# Patient Record
Sex: MALE | Race: WHITE | NOT HISPANIC OR LATINO | Employment: OTHER | URBAN - METROPOLITAN AREA
[De-identification: names, ages, dates, MRNs, and addresses within clinical notes are randomized per-mention and may not be internally consistent; named-entity substitution may affect disease eponyms.]

---

## 2017-02-01 ENCOUNTER — LAB (OUTPATIENT)
Dept: ENDOCRINOLOGY | Age: 77
End: 2017-02-01

## 2017-02-01 DIAGNOSIS — E29.1 HYPOGONADISM IN MALE: ICD-10-CM

## 2017-02-01 DIAGNOSIS — E78.5 DYSLIPIDEMIA: ICD-10-CM

## 2017-02-01 DIAGNOSIS — N52.8 OTHER MALE ERECTILE DYSFUNCTION: ICD-10-CM

## 2017-02-01 DIAGNOSIS — I10 BENIGN ESSENTIAL HYPERTENSION: ICD-10-CM

## 2017-02-01 DIAGNOSIS — E55.9 VITAMIN D DEFICIENCY: Primary | ICD-10-CM

## 2017-02-01 DIAGNOSIS — E55.9 VITAMIN D DEFICIENCY: ICD-10-CM

## 2017-02-01 DIAGNOSIS — E79.0 HYPERURICEMIA: ICD-10-CM

## 2017-02-01 DIAGNOSIS — N40.0 BENIGN NON-NODULAR PROSTATIC HYPERPLASIA WITHOUT LOWER URINARY TRACT SYMPTOMS: ICD-10-CM

## 2017-02-01 DIAGNOSIS — E11.9 CONTROLLED TYPE 2 DIABETES MELLITUS WITHOUT COMPLICATION (HCC): ICD-10-CM

## 2017-02-01 DIAGNOSIS — R73.9 HYPERGLYCEMIA: ICD-10-CM

## 2017-02-01 DIAGNOSIS — I10 ESSENTIAL HYPERTENSION: ICD-10-CM

## 2017-02-01 DIAGNOSIS — R79.89 LOW TESTOSTERONE: ICD-10-CM

## 2017-02-02 LAB
25(OH)D3+25(OH)D2 SERPL-MCNC: 36.1 NG/ML (ref 30–100)
ALBUMIN SERPL-MCNC: 4.6 G/DL (ref 3.5–5.2)
ALBUMIN/GLOB SERPL: 1.8 G/DL
ALP SERPL-CCNC: 61 U/L (ref 39–117)
ALT SERPL-CCNC: 18 U/L (ref 1–41)
AST SERPL-CCNC: 17 U/L (ref 1–40)
BASOPHILS # BLD AUTO: 0.04 10*3/MM3 (ref 0–0.2)
BASOPHILS NFR BLD AUTO: 0.6 % (ref 0–1.5)
BILIRUB SERPL-MCNC: 0.7 MG/DL (ref 0.1–1.2)
BUN SERPL-MCNC: 26 MG/DL (ref 8–23)
BUN/CREAT SERPL: 18.2 (ref 7–25)
C PEPTIDE SERPL-MCNC: 5.1 NG/ML (ref 1.1–4.4)
CALCIUM SERPL-MCNC: 10.2 MG/DL (ref 8.6–10.5)
CHLORIDE SERPL-SCNC: 100 MMOL/L (ref 98–107)
CHOLEST SERPL-MCNC: 210 MG/DL (ref 0–200)
CO2 SERPL-SCNC: 26.3 MMOL/L (ref 22–29)
CONV COMMENT: NORMAL
CREAT SERPL-MCNC: 1.43 MG/DL (ref 0.76–1.27)
EOSINOPHIL # BLD AUTO: 0.23 10*3/MM3 (ref 0–0.7)
EOSINOPHIL NFR BLD AUTO: 3.2 % (ref 0.3–6.2)
ERYTHROCYTE [DISTWIDTH] IN BLOOD BY AUTOMATED COUNT: 13.6 % (ref 11.5–14.5)
GLOBULIN SER CALC-MCNC: 2.5 GM/DL
GLUCOSE SERPL-MCNC: 146 MG/DL (ref 65–99)
HBA1C MFR BLD: 6.59 % (ref 4.8–5.6)
HCT VFR BLD AUTO: 47.5 % (ref 40.4–52.2)
HDLC SERPL-MCNC: 45 MG/DL (ref 40–60)
HGB BLD-MCNC: 15.7 G/DL (ref 13.7–17.6)
IMM GRANULOCYTES # BLD: 0.02 10*3/MM3 (ref 0–0.03)
IMM GRANULOCYTES NFR BLD: 0.3 % (ref 0–0.5)
LDLC SERPL CALC-MCNC: 123 MG/DL (ref 0–100)
LYMPHOCYTES # BLD AUTO: 2.28 10*3/MM3 (ref 0.9–4.8)
LYMPHOCYTES NFR BLD AUTO: 31.5 % (ref 19.6–45.3)
MCH RBC QN AUTO: 31.6 PG (ref 27–32.7)
MCHC RBC AUTO-ENTMCNC: 33.1 G/DL (ref 32.6–36.4)
MCV RBC AUTO: 95.6 FL (ref 79.8–96.2)
MONOCYTES # BLD AUTO: 0.5 10*3/MM3 (ref 0.2–1.2)
MONOCYTES NFR BLD AUTO: 6.9 % (ref 5–12)
NEUTROPHILS # BLD AUTO: 4.16 10*3/MM3 (ref 1.9–8.1)
NEUTROPHILS NFR BLD AUTO: 57.5 % (ref 42.7–76)
PLATELET # BLD AUTO: 189 10*3/MM3 (ref 140–500)
POTASSIUM SERPL-SCNC: 4.5 MMOL/L (ref 3.5–5.2)
PROT SERPL-MCNC: 7.1 G/DL (ref 6–8.5)
PSA SERPL-MCNC: 1.19 NG/ML (ref 0–4)
RBC # BLD AUTO: 4.97 10*6/MM3 (ref 4.6–6)
SHBG SERPL-SCNC: 26.2 NMOL/L (ref 19.3–76.4)
SODIUM SERPL-SCNC: 142 MMOL/L (ref 136–145)
T4 SERPL-MCNC: 6.42 MCG/DL (ref 4.5–11.7)
TESTOST FREE SERPL-MCNC: 14.1 PG/ML (ref 6.6–18.1)
TESTOST SERPL-MCNC: 513 NG/DL (ref 348–1197)
TRIGL SERPL-MCNC: 209 MG/DL (ref 0–150)
TSH SERPL DL<=0.005 MIU/L-ACNC: 3.01 MIU/ML (ref 0.27–4.2)
URATE SERPL-MCNC: 7.9 MG/DL (ref 3.4–7)
VLDLC SERPL CALC-MCNC: 41.8 MG/DL (ref 5–40)
WBC # BLD AUTO: 7.23 10*3/MM3 (ref 4.5–10.7)

## 2017-02-13 ENCOUNTER — OFFICE VISIT (OUTPATIENT)
Dept: ENDOCRINOLOGY | Age: 77
End: 2017-02-13

## 2017-02-13 VITALS
DIASTOLIC BLOOD PRESSURE: 78 MMHG | WEIGHT: 191.6 LBS | HEIGHT: 68 IN | BODY MASS INDEX: 29.04 KG/M2 | SYSTOLIC BLOOD PRESSURE: 114 MMHG | RESPIRATION RATE: 16 BRPM

## 2017-02-13 DIAGNOSIS — E11.9 CONTROLLED TYPE 2 DIABETES MELLITUS WITHOUT COMPLICATION, WITHOUT LONG-TERM CURRENT USE OF INSULIN (HCC): Primary | ICD-10-CM

## 2017-02-13 DIAGNOSIS — R73.9 HYPERGLYCEMIA: ICD-10-CM

## 2017-02-13 DIAGNOSIS — N40.0 BENIGN NON-NODULAR PROSTATIC HYPERPLASIA WITHOUT LOWER URINARY TRACT SYMPTOMS: ICD-10-CM

## 2017-02-13 DIAGNOSIS — E78.5 DYSLIPIDEMIA: ICD-10-CM

## 2017-02-13 DIAGNOSIS — N52.9 ERECTILE DYSFUNCTION, UNSPECIFIED ERECTILE DYSFUNCTION TYPE: ICD-10-CM

## 2017-02-13 DIAGNOSIS — E79.0 HYPERURICEMIA: ICD-10-CM

## 2017-02-13 DIAGNOSIS — R79.89 LOW TESTOSTERONE: ICD-10-CM

## 2017-02-13 DIAGNOSIS — I10 ESSENTIAL HYPERTENSION: ICD-10-CM

## 2017-02-13 DIAGNOSIS — E55.9 VITAMIN D DEFICIENCY: ICD-10-CM

## 2017-02-13 DIAGNOSIS — I10 BENIGN ESSENTIAL HYPERTENSION: ICD-10-CM

## 2017-02-13 DIAGNOSIS — N52.8 OTHER MALE ERECTILE DYSFUNCTION: ICD-10-CM

## 2017-02-13 PROCEDURE — 99214 OFFICE O/P EST MOD 30 MIN: CPT | Performed by: INTERNAL MEDICINE

## 2017-02-13 RX ORDER — EZETIMIBE 10 MG/1
10 TABLET ORAL DAILY
Qty: 90 TABLET | Refills: 3 | Status: SHIPPED | OUTPATIENT
Start: 2017-02-13 | End: 2017-08-22 | Stop reason: SDUPTHER

## 2017-02-13 RX ORDER — CANAGLIFLOZIN 100 MG/1
TABLET, FILM COATED ORAL
Qty: 30 TABLET | Refills: 5 | Status: SHIPPED | OUTPATIENT
Start: 2017-02-13 | End: 2017-08-22

## 2017-02-13 RX ORDER — LINAGLIPTIN 5 MG/1
5 TABLET, FILM COATED ORAL DAILY
Qty: 30 TABLET | Refills: 5 | Status: SHIPPED | OUTPATIENT
Start: 2017-02-13 | End: 2017-08-22

## 2017-02-13 RX ORDER — ERGOCALCIFEROL 1.25 MG/1
1 CAPSULE ORAL WEEKLY
Qty: 13 CAPSULE | Refills: 3 | Status: SHIPPED | OUTPATIENT
Start: 2017-02-13 | End: 2017-08-22 | Stop reason: SDUPTHER

## 2017-02-13 RX ORDER — FENOFIBRATE 145 MG/1
145 TABLET, COATED ORAL DAILY
Qty: 30 TABLET | Refills: 11 | Status: SHIPPED | OUTPATIENT
Start: 2017-02-13 | End: 2017-08-22 | Stop reason: SDUPTHER

## 2017-02-13 RX ORDER — ICOSAPENT ETHYL 1000 MG/1
4 CAPSULE ORAL DAILY
Qty: 120 CAPSULE | Refills: 5 | Status: SHIPPED | OUTPATIENT
Start: 2017-02-13 | End: 2017-08-22

## 2017-02-13 RX ORDER — ALLOPURINOL 100 MG/1
100 TABLET ORAL DAILY
Qty: 90 TABLET | Refills: 3 | Status: SHIPPED | OUTPATIENT
Start: 2017-02-13 | End: 2017-08-22 | Stop reason: SDUPTHER

## 2017-02-13 NOTE — PROGRESS NOTES
"Subjective   Randy Mccann is a 76 y.o. male seen for follow up for dyslipidemia, hypogonadism, HTN, lab review. Patient denies any problems or concerns.  History of Present Illness this is a 76-year-old gentleman known patient with diet-controlled type II diabetes hypertension dyslipidemia as well as the hypogonadism and the hyperuricemia.  Over the course of last 6 months he has had no significant health problems for which to go to the emergency room or hospital.  Visit Vitals   • /78   • Resp 16   • Ht 68\" (172.7 cm)   • Wt 191 lb 9.6 oz (86.9 kg)   • BMI 29.13 kg/m2     Allergies   Allergen Reactions   • Statins Other (See Comments)     Cramps,joint pain  Cramps,joint pain       Current Outpatient Prescriptions:   •  allopurinol (ZYLOPRIM) 100 MG tablet, Take 1 tablet by mouth daily., Disp: 90 tablet, Rfl: 3  •  doxylamine (UNISOM) 25 MG tablet, Take 1 tablet by mouth nightly., Disp: , Rfl:   •  ergocalciferol (ERGOCALCIFEROL) 89507 UNITS capsule, Take 1 capsule by mouth 1 (one) time per week., Disp: 13 capsule, Rfl: 3  •  fenofibrate (TRICOR) 145 MG tablet, Take 1 tablet by mouth Daily., Disp: 30 tablet, Rfl: 11  •  Multiple Vitamins-Minerals (MULTI VITAMIN/MINERALS) tablet, Take 1 tablet by mouth daily., Disp: , Rfl:   •  Olmesartan-Amlodipine-HCTZ 40-10-25 MG tablet, Take 1 tablet by mouth daily., Disp: 30 tablet, Rfl: 5  •  oxyCODONE-acetaminophen (PERCOCET) 7.5-325 MG per tablet, Take 1 tablet by mouth every 4 (four) hours as needed for moderate pain (4-6)., Disp: 10 tablet, Rfl: 0  •  tadalafil (CIALIS) 5 MG tablet, Take 1 tablet by mouth daily as needed for erectile dysfunction., Disp: 90 tablet, Rfl: 3  •  testosterone (ANDRODERM) 4 MG/24HR patch 24 hour 24 hour patch, Place 1 patch on the skin every night., Disp: 30 patch, Rfl: 5  •  ZETIA 10 MG tablet, Take 1 tablet (10 mg total) by mouth daily., Disp: 90 tablet, Rfl: 3          The following portions of the patient's history were reviewed and " updated as appropriate: allergies, current medications, past family history, past medical history, past social history, past surgical history and problem list.    Review of Systems   Constitutional: Negative.    HENT: Negative.    Eyes: Negative.    Respiratory: Negative.    Cardiovascular: Negative.    Gastrointestinal: Negative.    Endocrine: Negative.    Genitourinary: Negative.    Musculoskeletal: Negative.    Skin: Negative.    Allergic/Immunologic: Negative.    Neurological: Negative.    Hematological: Negative.    Psychiatric/Behavioral: Negative.        Objective   Physical Exam   Constitutional: He is oriented to person, place, and time. He appears well-developed and well-nourished. No distress.   HENT:   Head: Normocephalic and atraumatic.   Right Ear: External ear normal.   Left Ear: External ear normal.   Nose: Nose normal.   Mouth/Throat: Oropharynx is clear and moist. No oropharyngeal exudate.   Eyes: Conjunctivae and EOM are normal. Pupils are equal, round, and reactive to light. Right eye exhibits no discharge. Left eye exhibits no discharge. No scleral icterus.   Neck: Normal range of motion. Neck supple. No JVD present. No tracheal deviation present. No thyromegaly present.   Cardiovascular: Normal rate, regular rhythm, normal heart sounds and intact distal pulses.  Exam reveals no gallop and no friction rub.    No murmur heard.  Pulmonary/Chest: Effort normal and breath sounds normal. No stridor. No respiratory distress. He has no wheezes. He has no rales. He exhibits no tenderness.   Abdominal: Soft. Bowel sounds are normal. He exhibits no distension and no mass. There is no tenderness. There is no rebound and no guarding. No hernia.   Musculoskeletal: Normal range of motion. He exhibits no edema, tenderness or deformity.   Lymphadenopathy:     He has no cervical adenopathy.   Neurological: He is alert and oriented to person, place, and time. He has normal reflexes. He displays normal reflexes.  No cranial nerve deficit. He exhibits normal muscle tone. Coordination normal.   Skin: Skin is warm. No rash noted. He is not diaphoretic. No erythema. No pallor.   Psychiatric: He has a normal mood and affect. His behavior is normal. Judgment and thought content normal.   Nursing note and vitals reviewed.     Results for orders placed or performed in visit on 02/01/17   Comprehensive metabolic panel   Result Value Ref Range    Glucose 146 (H) 65 - 99 mg/dL    BUN 26 (H) 8 - 23 mg/dL    Creatinine 1.43 (H) 0.76 - 1.27 mg/dL    eGFR Non African Am 48 (L) >60 mL/min/1.73    eGFR African Am 59 (L) >60 mL/min/1.73    BUN/Creatinine Ratio 18.2 7.0 - 25.0    Sodium 142 136 - 145 mmol/L    Potassium 4.5 3.5 - 5.2 mmol/L    Chloride 100 98 - 107 mmol/L    Total CO2 26.3 22.0 - 29.0 mmol/L    Calcium 10.2 8.6 - 10.5 mg/dL    Total Protein 7.1 6.0 - 8.5 g/dL    Albumin 4.60 3.50 - 5.20 g/dL    Globulin 2.5 gm/dL    A/G Ratio 1.8 g/dL    Total Bilirubin 0.7 0.1 - 1.2 mg/dL    Alkaline Phosphatase 61 39 - 117 U/L    AST (SGOT) 17 1 - 40 U/L    ALT (SGPT) 18 1 - 41 U/L   C-peptide   Result Value Ref Range    C-Peptide 5.1 (H) 1.1 - 4.4 ng/mL   Hemoglobin A1c   Result Value Ref Range    Hemoglobin A1C 6.59 (H) 4.80 - 5.60 %   Lipid panel   Result Value Ref Range    Total Cholesterol 210 (H) 0 - 200 mg/dL    Triglycerides 209 (H) 0 - 150 mg/dL    HDL Cholesterol 45 40 - 60 mg/dL    VLDL Cholesterol 41.8 (H) 5 - 40 mg/dL    LDL Cholesterol  123 (H) 0 - 100 mg/dL   PSA   Result Value Ref Range    PSA 1.190 0.000 - 4.000 ng/mL   T4 and TSH ([facility] Only)   Result Value Ref Range    TSH 3.010 0.270 - 4.200 mIU/mL    T4, Total 6.42 4.50 - 11.70 mcg/dL   Uric acid   Result Value Ref Range    Uric Acid 7.9 (H) 3.4 - 7.0 mg/dL   TestT+TestF+SHBG   Result Value Ref Range    Testosterone, Total 513 348 - 1197 ng/dL    Comment Comment     Testosterone, Free 14.1 6.6 - 18.1 pg/mL    Sex Hormone Binding Globulin 26.2 19.3 - 76.4 nmol/L   CBC  and Differential   Result Value Ref Range    WBC 7.23 4.50 - 10.70 10*3/mm3    RBC 4.97 4.60 - 6.00 10*6/mm3    Hemoglobin 15.7 13.7 - 17.6 g/dL    Hematocrit 47.5 40.4 - 52.2 %    MCV 95.6 79.8 - 96.2 fL    MCH 31.6 27.0 - 32.7 pg    MCHC 33.1 32.6 - 36.4 g/dL    RDW 13.6 11.5 - 14.5 %    Platelets 189 140 - 500 10*3/mm3    Neutrophil Rel % 57.5 42.7 - 76.0 %    Lymphocyte Rel % 31.5 19.6 - 45.3 %    Monocyte Rel % 6.9 5.0 - 12.0 %    Eosinophil Rel % 3.2 0.3 - 6.2 %    Basophil Rel % 0.6 0.0 - 1.5 %    Neutrophils Absolute 4.16 1.90 - 8.10 10*3/mm3    Lymphocytes Absolute 2.28 0.90 - 4.80 10*3/mm3    Monocytes Absolute 0.50 0.20 - 1.20 10*3/mm3    Eosinophils Absolute 0.23 0.00 - 0.70 10*3/mm3    Basophils Absolute 0.04 0.00 - 0.20 10*3/mm3    Immature Granulocyte Rel % 0.3 0.0 - 0.5 %    Immature Grans Absolute 0.02 0.00 - 0.03 10*3/mm3           Assessment/Plan   Diagnoses and all orders for this visit:    Controlled type 2 diabetes mellitus without complication, without long-term current use of insulin  -     T4 & TSH ([facility]); Future  -     Uric Acid; Future  -     TestT+TestF+SHBG; Future  -     Vitamin D 25 Hydroxy; Future  -     Comprehensive Metabolic Panel; Future  -     C-Peptide; Future  -     Hemoglobin A1c; Future  -     Lipid Panel; Future  -     MicroAlbumin, Urine, Random; Future  -     PSA; Future  -     Hemoglobin & Hematocrit, Blood; Future    Benign essential hypertension  -     T4 & TSH ([facility]); Future  -     Uric Acid; Future  -     TestT+TestF+SHBG; Future  -     Vitamin D 25 Hydroxy; Future  -     Comprehensive Metabolic Panel; Future  -     C-Peptide; Future  -     Hemoglobin A1c; Future  -     Lipid Panel; Future  -     MicroAlbumin, Urine, Random; Future  -     PSA; Future  -     Hemoglobin & Hematocrit, Blood; Future    Vitamin D deficiency  -     ergocalciferol (ERGOCALCIFEROL) 69557 UNITS capsule; Take 1 capsule by mouth 1 (One) Time Per Week.  -     allopurinol (ZYLOPRIM) 100 MG  tablet; Take 1 tablet by mouth Daily.  -     ZETIA 10 MG tablet; Take 1 tablet by mouth Daily.  -     T4 & TSH ([facility]); Future  -     Uric Acid; Future  -     TestT+TestF+SHBG; Future  -     Vitamin D 25 Hydroxy; Future  -     Comprehensive Metabolic Panel; Future  -     C-Peptide; Future  -     Hemoglobin A1c; Future  -     Lipid Panel; Future  -     MicroAlbumin, Urine, Random; Future  -     PSA; Future  -     Hemoglobin & Hematocrit, Blood; Future    Low testosterone  -     ergocalciferol (ERGOCALCIFEROL) 83138 UNITS capsule; Take 1 capsule by mouth 1 (One) Time Per Week.  -     allopurinol (ZYLOPRIM) 100 MG tablet; Take 1 tablet by mouth Daily.  -     ZETIA 10 MG tablet; Take 1 tablet by mouth Daily.  -     T4 & TSH ([facility]); Future  -     Uric Acid; Future  -     TestT+TestF+SHBG; Future  -     Vitamin D 25 Hydroxy; Future  -     Comprehensive Metabolic Panel; Future  -     C-Peptide; Future  -     Hemoglobin A1c; Future  -     Lipid Panel; Future  -     MicroAlbumin, Urine, Random; Future  -     PSA; Future  -     Hemoglobin & Hematocrit, Blood; Future    Erectile dysfunction, unspecified erectile dysfunction type  -     T4 & TSH ([facility]); Future  -     Uric Acid; Future  -     TestT+TestF+SHBG; Future  -     Vitamin D 25 Hydroxy; Future  -     Comprehensive Metabolic Panel; Future  -     C-Peptide; Future  -     Hemoglobin A1c; Future  -     Lipid Panel; Future  -     MicroAlbumin, Urine, Random; Future  -     PSA; Future  -     Hemoglobin & Hematocrit, Blood; Future    Dyslipidemia  -     ergocalciferol (ERGOCALCIFEROL) 46650 UNITS capsule; Take 1 capsule by mouth 1 (One) Time Per Week.  -     allopurinol (ZYLOPRIM) 100 MG tablet; Take 1 tablet by mouth Daily.  -     ZETIA 10 MG tablet; Take 1 tablet by mouth Daily.  -     T4 & TSH ([facility]); Future  -     Uric Acid; Future  -     TestT+TestF+SHBG; Future  -     Vitamin D 25 Hydroxy; Future  -     Comprehensive Metabolic Panel; Future  -     C-Peptide;  Future  -     Hemoglobin A1c; Future  -     Lipid Panel; Future  -     MicroAlbumin, Urine, Random; Future  -     PSA; Future  -     Hemoglobin & Hematocrit, Blood; Future    Hyperuricemia  -     ergocalciferol (ERGOCALCIFEROL) 96769 UNITS capsule; Take 1 capsule by mouth 1 (One) Time Per Week.  -     allopurinol (ZYLOPRIM) 100 MG tablet; Take 1 tablet by mouth Daily.  -     ZETIA 10 MG tablet; Take 1 tablet by mouth Daily.  -     T4 & TSH ([facility]); Future  -     Uric Acid; Future  -     TestT+TestF+SHBG; Future  -     Vitamin D 25 Hydroxy; Future  -     Comprehensive Metabolic Panel; Future  -     C-Peptide; Future  -     Hemoglobin A1c; Future  -     Lipid Panel; Future  -     MicroAlbumin, Urine, Random; Future  -     PSA; Future  -     Hemoglobin & Hematocrit, Blood; Future    Essential hypertension  -     ergocalciferol (ERGOCALCIFEROL) 31207 UNITS capsule; Take 1 capsule by mouth 1 (One) Time Per Week.  -     allopurinol (ZYLOPRIM) 100 MG tablet; Take 1 tablet by mouth Daily.  -     ZETIA 10 MG tablet; Take 1 tablet by mouth Daily.  -     T4 & TSH ([facility]); Future  -     Uric Acid; Future  -     TestT+TestF+SHBG; Future  -     Vitamin D 25 Hydroxy; Future  -     Comprehensive Metabolic Panel; Future  -     C-Peptide; Future  -     Hemoglobin A1c; Future  -     Lipid Panel; Future  -     MicroAlbumin, Urine, Random; Future  -     PSA; Future  -     Hemoglobin & Hematocrit, Blood; Future    Other male erectile dysfunction  -     ergocalciferol (ERGOCALCIFEROL) 28088 UNITS capsule; Take 1 capsule by mouth 1 (One) Time Per Week.  -     allopurinol (ZYLOPRIM) 100 MG tablet; Take 1 tablet by mouth Daily.  -     ZETIA 10 MG tablet; Take 1 tablet by mouth Daily.  -     T4 & TSH ([facility]); Future  -     Uric Acid; Future  -     TestT+TestF+SHBG; Future  -     Vitamin D 25 Hydroxy; Future  -     Comprehensive Metabolic Panel; Future  -     C-Peptide; Future  -     Hemoglobin A1c; Future  -     Lipid Panel; Future  -      MicroAlbumin, Urine, Random; Future  -     PSA; Future  -     Hemoglobin & Hematocrit, Blood; Future    Hyperglycemia  -     ergocalciferol (ERGOCALCIFEROL) 10821 UNITS capsule; Take 1 capsule by mouth 1 (One) Time Per Week.  -     allopurinol (ZYLOPRIM) 100 MG tablet; Take 1 tablet by mouth Daily.  -     ZETIA 10 MG tablet; Take 1 tablet by mouth Daily.  -     T4 & TSH ([facility]); Future  -     Uric Acid; Future  -     TestT+TestF+SHBG; Future  -     Vitamin D 25 Hydroxy; Future  -     Comprehensive Metabolic Panel; Future  -     C-Peptide; Future  -     Hemoglobin A1c; Future  -     Lipid Panel; Future  -     MicroAlbumin, Urine, Random; Future  -     PSA; Future  -     Hemoglobin & Hematocrit, Blood; Future    Benign non-nodular prostatic hyperplasia without lower urinary tract symptoms  -     T4 & TSH ([facility]); Future  -     Uric Acid; Future  -     TestT+TestF+SHBG; Future  -     Vitamin D 25 Hydroxy; Future  -     Comprehensive Metabolic Panel; Future  -     C-Peptide; Future  -     Hemoglobin A1c; Future  -     Lipid Panel; Future  -     MicroAlbumin, Urine, Random; Future  -     PSA; Future  -     Hemoglobin & Hematocrit, Blood; Future    Other orders  -     TRADJENTA 5 MG tablet tablet; Take 1 tablet by mouth Daily.  -     INVOKANA 100 MG tablet; Take 1 tablet every morning  -     VASCEPA 1 G capsule capsule; Take 4 g by mouth Daily.  -     testosterone (ANDRODERM) 4 MG/24HR patch 24 hour 24 hour patch; Place 1 patch on the skin Every Night.  -     fenofibrate (TRICOR) 145 MG tablet; Take 1 tablet by mouth Daily.              in summary I saw and examined this 76-year-old gentleman for above-mentioned problems.  I reviewed his laboratory evaluation of 02/01/2017 and provided him with a hard copy of it.  His hemoglobin A1c has arisen and so is his a LDL as well as triglycerides.  We will therefore go ahead and start him on Tradjenta 5 mg every morning as well as [id number] mg every morning and Vascepa 2  capsules twice daily.  He will continue rest of his prescriptions and will see me in 4 months or sooner if needed with laboratory evaluation prior to each office visit.

## 2017-03-01 ENCOUNTER — RESULTS ENCOUNTER (OUTPATIENT)
Dept: ENDOCRINOLOGY | Age: 77
End: 2017-03-01

## 2017-03-01 DIAGNOSIS — E55.9 VITAMIN D DEFICIENCY: ICD-10-CM

## 2017-03-08 DIAGNOSIS — E78.5 DYSLIPIDEMIA: ICD-10-CM

## 2017-03-08 DIAGNOSIS — I10 ESSENTIAL HYPERTENSION: ICD-10-CM

## 2017-03-08 DIAGNOSIS — R79.89 LOW TESTOSTERONE: ICD-10-CM

## 2017-03-08 DIAGNOSIS — E79.0 HYPERURICEMIA: ICD-10-CM

## 2017-03-08 DIAGNOSIS — E55.9 VITAMIN D DEFICIENCY: ICD-10-CM

## 2017-03-08 DIAGNOSIS — N52.8 OTHER MALE ERECTILE DYSFUNCTION: ICD-10-CM

## 2017-03-08 DIAGNOSIS — R73.9 HYPERGLYCEMIA: ICD-10-CM

## 2017-03-08 RX ORDER — TADALAFIL 5 MG
TABLET ORAL
Qty: 90 TABLET | Refills: 0 | Status: SHIPPED | OUTPATIENT
Start: 2017-03-08 | End: 2017-08-22 | Stop reason: SDUPTHER

## 2017-06-07 ENCOUNTER — RESULTS ENCOUNTER (OUTPATIENT)
Dept: ENDOCRINOLOGY | Age: 77
End: 2017-06-07

## 2017-06-07 DIAGNOSIS — E55.9 VITAMIN D DEFICIENCY: ICD-10-CM

## 2017-06-07 DIAGNOSIS — R73.9 HYPERGLYCEMIA: ICD-10-CM

## 2017-06-07 DIAGNOSIS — N40.0 BENIGN NON-NODULAR PROSTATIC HYPERPLASIA WITHOUT LOWER URINARY TRACT SYMPTOMS: ICD-10-CM

## 2017-06-07 DIAGNOSIS — I10 ESSENTIAL HYPERTENSION: ICD-10-CM

## 2017-06-07 DIAGNOSIS — N52.9 ERECTILE DYSFUNCTION, UNSPECIFIED ERECTILE DYSFUNCTION TYPE: ICD-10-CM

## 2017-06-07 DIAGNOSIS — E79.0 HYPERURICEMIA: ICD-10-CM

## 2017-06-07 DIAGNOSIS — I10 BENIGN ESSENTIAL HYPERTENSION: ICD-10-CM

## 2017-06-07 DIAGNOSIS — R79.89 LOW TESTOSTERONE: ICD-10-CM

## 2017-06-07 DIAGNOSIS — N52.8 OTHER MALE ERECTILE DYSFUNCTION: ICD-10-CM

## 2017-06-07 DIAGNOSIS — E78.5 DYSLIPIDEMIA: ICD-10-CM

## 2017-06-07 DIAGNOSIS — E11.9 CONTROLLED TYPE 2 DIABETES MELLITUS WITHOUT COMPLICATION, WITHOUT LONG-TERM CURRENT USE OF INSULIN (HCC): ICD-10-CM

## 2017-06-14 DIAGNOSIS — E78.5 DYSLIPIDEMIA: ICD-10-CM

## 2017-06-14 DIAGNOSIS — E79.0 HYPERURICEMIA: ICD-10-CM

## 2017-06-14 DIAGNOSIS — E55.9 VITAMIN D DEFICIENCY: ICD-10-CM

## 2017-06-14 DIAGNOSIS — N52.8 OTHER MALE ERECTILE DYSFUNCTION: ICD-10-CM

## 2017-06-14 DIAGNOSIS — R79.89 LOW TESTOSTERONE: ICD-10-CM

## 2017-06-14 DIAGNOSIS — R73.9 HYPERGLYCEMIA: ICD-10-CM

## 2017-06-14 DIAGNOSIS — I10 ESSENTIAL HYPERTENSION: ICD-10-CM

## 2017-06-14 RX ORDER — TADALAFIL 5 MG
TABLET ORAL
Qty: 90 TABLET | Refills: 0 | Status: SHIPPED | OUTPATIENT
Start: 2017-06-14 | End: 2017-08-22 | Stop reason: SDUPTHER

## 2017-07-31 ENCOUNTER — LAB (OUTPATIENT)
Dept: ENDOCRINOLOGY | Age: 77
End: 2017-07-31

## 2017-07-31 DIAGNOSIS — R79.89 LOW TESTOSTERONE: ICD-10-CM

## 2017-07-31 DIAGNOSIS — N52.9 ERECTILE DYSFUNCTION, UNSPECIFIED ERECTILE DYSFUNCTION TYPE: ICD-10-CM

## 2017-07-31 DIAGNOSIS — N40.0 BENIGN NON-NODULAR PROSTATIC HYPERPLASIA WITHOUT LOWER URINARY TRACT SYMPTOMS: ICD-10-CM

## 2017-07-31 DIAGNOSIS — I10 ESSENTIAL HYPERTENSION: ICD-10-CM

## 2017-07-31 DIAGNOSIS — N52.8 OTHER MALE ERECTILE DYSFUNCTION: ICD-10-CM

## 2017-07-31 DIAGNOSIS — E78.5 DYSLIPIDEMIA: ICD-10-CM

## 2017-07-31 DIAGNOSIS — I10 BENIGN ESSENTIAL HYPERTENSION: ICD-10-CM

## 2017-07-31 DIAGNOSIS — R73.9 HYPERGLYCEMIA: ICD-10-CM

## 2017-07-31 DIAGNOSIS — E11.9 CONTROLLED TYPE 2 DIABETES MELLITUS WITHOUT COMPLICATION, WITHOUT LONG-TERM CURRENT USE OF INSULIN (HCC): ICD-10-CM

## 2017-07-31 DIAGNOSIS — E55.9 VITAMIN D DEFICIENCY: ICD-10-CM

## 2017-07-31 DIAGNOSIS — E79.0 HYPERURICEMIA: ICD-10-CM

## 2017-08-01 LAB
25(OH)D3+25(OH)D2 SERPL-MCNC: 32.1 NG/ML (ref 30–100)
ALBUMIN SERPL-MCNC: 4.7 G/DL (ref 3.5–5.2)
ALBUMIN/GLOB SERPL: 1.9 G/DL
ALP SERPL-CCNC: 61 U/L (ref 39–117)
ALT SERPL-CCNC: 30 U/L (ref 1–41)
AST SERPL-CCNC: 31 U/L (ref 1–40)
BILIRUB SERPL-MCNC: 1.3 MG/DL (ref 0.1–1.2)
BUN SERPL-MCNC: 12 MG/DL (ref 8–23)
BUN/CREAT SERPL: 8.8 (ref 7–25)
C PEPTIDE SERPL-MCNC: 3.3 NG/ML (ref 1.1–4.4)
CALCIUM SERPL-MCNC: 10.2 MG/DL (ref 8.6–10.5)
CHLORIDE SERPL-SCNC: 99 MMOL/L (ref 98–107)
CHOLEST SERPL-MCNC: 194 MG/DL (ref 0–200)
CO2 SERPL-SCNC: 25.1 MMOL/L (ref 22–29)
CREAT SERPL-MCNC: 1.37 MG/DL (ref 0.76–1.27)
GLOBULIN SER CALC-MCNC: 2.5 GM/DL
GLUCOSE SERPL-MCNC: 133 MG/DL (ref 65–99)
HBA1C MFR BLD: 6.7 % (ref 4.8–5.6)
HCT VFR BLD AUTO: 47 % (ref 40.4–52.2)
HDLC SERPL-MCNC: 42 MG/DL (ref 40–60)
HGB BLD-MCNC: 15.5 G/DL (ref 13.7–17.6)
LDLC SERPL CALC-MCNC: 108 MG/DL (ref 0–100)
MICROALBUMIN UR-MCNC: 9.4 UG/ML
POTASSIUM SERPL-SCNC: 4.5 MMOL/L (ref 3.5–5.2)
PROT SERPL-MCNC: 7.2 G/DL (ref 6–8.5)
PSA SERPL-MCNC: 1.65 NG/ML (ref 0–4)
SHBG SERPL-SCNC: 27 NMOL/L (ref 19.3–76.4)
SODIUM SERPL-SCNC: 141 MMOL/L (ref 136–145)
T4 SERPL-MCNC: 6.84 MCG/DL (ref 4.5–11.7)
TESTOST FREE SERPL-MCNC: 15.7 PG/ML (ref 6.6–18.1)
TESTOST SERPL-MCNC: 615 NG/DL (ref 264–916)
TRIGL SERPL-MCNC: 218 MG/DL (ref 0–150)
TSH SERPL DL<=0.005 MIU/L-ACNC: 2.76 MIU/ML (ref 0.27–4.2)
URATE SERPL-MCNC: 7.2 MG/DL (ref 3.4–7)
VLDLC SERPL CALC-MCNC: 43.6 MG/DL (ref 5–40)

## 2017-08-22 ENCOUNTER — OFFICE VISIT (OUTPATIENT)
Dept: ENDOCRINOLOGY | Age: 77
End: 2017-08-22

## 2017-08-22 VITALS
BODY MASS INDEX: 29.79 KG/M2 | SYSTOLIC BLOOD PRESSURE: 116 MMHG | WEIGHT: 189.8 LBS | DIASTOLIC BLOOD PRESSURE: 76 MMHG | HEIGHT: 67 IN

## 2017-08-22 DIAGNOSIS — R79.89 LOW TESTOSTERONE: ICD-10-CM

## 2017-08-22 DIAGNOSIS — E78.5 DYSLIPIDEMIA: ICD-10-CM

## 2017-08-22 DIAGNOSIS — E11.9 CONTROLLED TYPE 2 DIABETES MELLITUS WITHOUT COMPLICATION, WITHOUT LONG-TERM CURRENT USE OF INSULIN (HCC): Primary | ICD-10-CM

## 2017-08-22 DIAGNOSIS — I10 ESSENTIAL HYPERTENSION: ICD-10-CM

## 2017-08-22 DIAGNOSIS — E79.0 HYPERURICEMIA: ICD-10-CM

## 2017-08-22 DIAGNOSIS — N52.9 ERECTILE DYSFUNCTION, UNSPECIFIED ERECTILE DYSFUNCTION TYPE: ICD-10-CM

## 2017-08-22 DIAGNOSIS — E55.9 VITAMIN D DEFICIENCY: ICD-10-CM

## 2017-08-22 DIAGNOSIS — R73.9 HYPERGLYCEMIA: ICD-10-CM

## 2017-08-22 DIAGNOSIS — N52.8 OTHER MALE ERECTILE DYSFUNCTION: ICD-10-CM

## 2017-08-22 PROCEDURE — 99214 OFFICE O/P EST MOD 30 MIN: CPT | Performed by: INTERNAL MEDICINE

## 2017-08-22 RX ORDER — CANAGLIFLOZIN 300 MG/1
300 TABLET, FILM COATED ORAL DAILY
Qty: 30 TABLET | Refills: 5 | Status: SHIPPED | OUTPATIENT
Start: 2017-08-22 | End: 2018-03-13 | Stop reason: SDUPTHER

## 2017-08-22 RX ORDER — ALLOPURINOL 100 MG/1
100 TABLET ORAL DAILY
Qty: 90 TABLET | Refills: 3 | Status: SHIPPED | OUTPATIENT
Start: 2017-08-22 | End: 2018-03-13 | Stop reason: SDUPTHER

## 2017-08-22 RX ORDER — FENOFIBRATE 145 MG/1
145 TABLET, COATED ORAL DAILY
Qty: 30 TABLET | Refills: 11 | Status: SHIPPED | OUTPATIENT
Start: 2017-08-22 | End: 2018-03-13 | Stop reason: SDUPTHER

## 2017-08-22 RX ORDER — ERGOCALCIFEROL 1.25 MG/1
50000 CAPSULE ORAL WEEKLY
Qty: 13 CAPSULE | Refills: 3 | Status: SHIPPED | OUTPATIENT
Start: 2017-08-22 | End: 2018-03-13 | Stop reason: SDUPTHER

## 2017-08-22 RX ORDER — EZETIMIBE 10 MG/1
10 TABLET ORAL DAILY
Qty: 90 TABLET | Refills: 3 | Status: SHIPPED | OUTPATIENT
Start: 2017-08-22 | End: 2018-03-13 | Stop reason: SDUPTHER

## 2017-08-22 RX ORDER — TADALAFIL 5 MG
5 TABLET ORAL DAILY PRN
Qty: 90 TABLET | Refills: 3 | Status: SHIPPED | OUTPATIENT
Start: 2017-08-22 | End: 2018-03-13

## 2017-08-22 RX ORDER — OLMESARTAN MEDOXOMIL, AMLODIPINE AND HYDROCHLOROTHIAZIDE TABLET 40/10/25 MG 40; 10; 25 MG/1; MG/1; MG/1
1 TABLET ORAL DAILY
Qty: 30 TABLET | Refills: 5 | Status: SHIPPED | OUTPATIENT
Start: 2017-08-22 | End: 2018-03-13 | Stop reason: SDUPTHER

## 2017-08-25 RX ORDER — TESTOSTERONE 4 MG/D
PATCH TRANSDERMAL
Qty: 30 PATCH | Refills: 0 | Status: CANCELLED | OUTPATIENT
Start: 2017-08-25

## 2017-12-03 DIAGNOSIS — R73.9 HYPERGLYCEMIA: ICD-10-CM

## 2017-12-03 DIAGNOSIS — E55.9 VITAMIN D DEFICIENCY: ICD-10-CM

## 2017-12-03 DIAGNOSIS — I10 ESSENTIAL HYPERTENSION: ICD-10-CM

## 2017-12-03 DIAGNOSIS — N52.8 OTHER MALE ERECTILE DYSFUNCTION: ICD-10-CM

## 2017-12-03 DIAGNOSIS — E78.5 DYSLIPIDEMIA: ICD-10-CM

## 2017-12-03 DIAGNOSIS — R79.89 LOW TESTOSTERONE: ICD-10-CM

## 2017-12-03 DIAGNOSIS — E79.0 HYPERURICEMIA: ICD-10-CM

## 2017-12-04 RX ORDER — TADALAFIL 5 MG
TABLET ORAL
Qty: 90 TABLET | Refills: 0 | Status: SHIPPED | OUTPATIENT
Start: 2017-12-04 | End: 2018-03-13 | Stop reason: SDUPTHER

## 2018-02-07 ENCOUNTER — RESULTS ENCOUNTER (OUTPATIENT)
Dept: ENDOCRINOLOGY | Age: 78
End: 2018-02-07

## 2018-02-07 DIAGNOSIS — E55.9 VITAMIN D DEFICIENCY: ICD-10-CM

## 2018-02-07 DIAGNOSIS — R79.89 LOW TESTOSTERONE: ICD-10-CM

## 2018-02-07 DIAGNOSIS — N52.9 ERECTILE DYSFUNCTION, UNSPECIFIED ERECTILE DYSFUNCTION TYPE: ICD-10-CM

## 2018-02-07 DIAGNOSIS — E78.5 DYSLIPIDEMIA: ICD-10-CM

## 2018-02-07 DIAGNOSIS — E79.0 HYPERURICEMIA: ICD-10-CM

## 2018-02-07 DIAGNOSIS — I10 ESSENTIAL HYPERTENSION: ICD-10-CM

## 2018-02-07 DIAGNOSIS — E11.9 CONTROLLED TYPE 2 DIABETES MELLITUS WITHOUT COMPLICATION, WITHOUT LONG-TERM CURRENT USE OF INSULIN (HCC): ICD-10-CM

## 2018-02-28 ENCOUNTER — LAB (OUTPATIENT)
Dept: ENDOCRINOLOGY | Age: 78
End: 2018-02-28

## 2018-02-28 DIAGNOSIS — R79.89 LOW TESTOSTERONE: ICD-10-CM

## 2018-02-28 DIAGNOSIS — E78.5 DYSLIPIDEMIA: ICD-10-CM

## 2018-02-28 DIAGNOSIS — E55.9 VITAMIN D DEFICIENCY: ICD-10-CM

## 2018-02-28 DIAGNOSIS — N52.9 ERECTILE DYSFUNCTION, UNSPECIFIED ERECTILE DYSFUNCTION TYPE: ICD-10-CM

## 2018-02-28 DIAGNOSIS — I10 ESSENTIAL HYPERTENSION: ICD-10-CM

## 2018-02-28 DIAGNOSIS — E11.9 CONTROLLED TYPE 2 DIABETES MELLITUS WITHOUT COMPLICATION, WITHOUT LONG-TERM CURRENT USE OF INSULIN (HCC): ICD-10-CM

## 2018-02-28 DIAGNOSIS — E79.0 HYPERURICEMIA: ICD-10-CM

## 2018-03-02 LAB
25(OH)D3+25(OH)D2 SERPL-MCNC: 44.3 NG/ML (ref 30–100)
ALBUMIN SERPL-MCNC: 4.7 G/DL (ref 3.5–5.2)
ALBUMIN/GLOB SERPL: 2 G/DL
ALP SERPL-CCNC: 61 U/L (ref 39–117)
ALT SERPL-CCNC: 28 U/L (ref 1–41)
AST SERPL-CCNC: 27 U/L (ref 1–40)
BILIRUB SERPL-MCNC: 0.8 MG/DL (ref 0.1–1.2)
BUN SERPL-MCNC: 17 MG/DL (ref 8–23)
BUN/CREAT SERPL: 13 (ref 7–25)
C PEPTIDE SERPL-MCNC: 3.3 NG/ML (ref 1.1–4.4)
CALCIUM SERPL-MCNC: 9.6 MG/DL (ref 8.6–10.5)
CHLORIDE SERPL-SCNC: 101 MMOL/L (ref 98–107)
CHOLEST SERPL-MCNC: 214 MG/DL (ref 0–200)
CO2 SERPL-SCNC: 31.2 MMOL/L (ref 22–29)
CREAT SERPL-MCNC: 1.31 MG/DL (ref 0.76–1.27)
GFR SERPLBLD CREATININE-BSD FMLA CKD-EPI: 53 ML/MIN/1.73
GFR SERPLBLD CREATININE-BSD FMLA CKD-EPI: 65 ML/MIN/1.73
GLOBULIN SER CALC-MCNC: 2.3 GM/DL
GLUCOSE SERPL-MCNC: 132 MG/DL (ref 65–99)
HBA1C MFR BLD: 6.3 % (ref 4.8–5.6)
HCT VFR BLD AUTO: 45.5 % (ref 40.4–52.2)
HDLC SERPL-MCNC: 54 MG/DL (ref 40–60)
HGB BLD-MCNC: 15.2 G/DL (ref 13.7–17.6)
INTERPRETATION: NORMAL
LDLC SERPL CALC-MCNC: 127 MG/DL (ref 0–100)
Lab: NORMAL
MICROALBUMIN UR-MCNC: <3 UG/ML
POTASSIUM SERPL-SCNC: 4.5 MMOL/L (ref 3.5–5.2)
PROT SERPL-MCNC: 7 G/DL (ref 6–8.5)
SHBG SERPL-SCNC: 26.9 NMOL/L (ref 19.3–76.4)
SODIUM SERPL-SCNC: 141 MMOL/L (ref 136–145)
T4 SERPL-MCNC: 6.79 MCG/DL (ref 4.5–11.7)
TESTOST FREE SERPL-MCNC: 5.1 PG/ML (ref 6.6–18.1)
TESTOST SERPL-MCNC: 105 NG/DL (ref 264–916)
TRIGL SERPL-MCNC: 164 MG/DL (ref 0–150)
TSH SERPL DL<=0.005 MIU/L-ACNC: 3.98 MIU/ML (ref 0.27–4.2)
URATE SERPL-MCNC: 6.7 MG/DL (ref 3.4–7)
VLDLC SERPL CALC-MCNC: 32.8 MG/DL (ref 5–40)

## 2018-03-13 ENCOUNTER — OFFICE VISIT (OUTPATIENT)
Dept: ENDOCRINOLOGY | Age: 78
End: 2018-03-13

## 2018-03-13 VITALS
BODY MASS INDEX: 28.76 KG/M2 | WEIGHT: 189.8 LBS | HEIGHT: 68 IN | RESPIRATION RATE: 16 BRPM | DIASTOLIC BLOOD PRESSURE: 80 MMHG | SYSTOLIC BLOOD PRESSURE: 138 MMHG

## 2018-03-13 DIAGNOSIS — E55.9 VITAMIN D DEFICIENCY: ICD-10-CM

## 2018-03-13 DIAGNOSIS — N52.8 OTHER MALE ERECTILE DYSFUNCTION: ICD-10-CM

## 2018-03-13 DIAGNOSIS — N40.0 BENIGN NON-NODULAR PROSTATIC HYPERPLASIA WITHOUT LOWER URINARY TRACT SYMPTOMS: ICD-10-CM

## 2018-03-13 DIAGNOSIS — R79.89 LOW TESTOSTERONE: ICD-10-CM

## 2018-03-13 DIAGNOSIS — E11.9 CONTROLLED TYPE 2 DIABETES MELLITUS WITHOUT COMPLICATION, WITHOUT LONG-TERM CURRENT USE OF INSULIN (HCC): Primary | ICD-10-CM

## 2018-03-13 DIAGNOSIS — I10 ESSENTIAL HYPERTENSION: ICD-10-CM

## 2018-03-13 DIAGNOSIS — E79.0 HYPERURICEMIA: ICD-10-CM

## 2018-03-13 DIAGNOSIS — R73.9 HYPERGLYCEMIA: ICD-10-CM

## 2018-03-13 DIAGNOSIS — E78.5 DYSLIPIDEMIA: ICD-10-CM

## 2018-03-13 DIAGNOSIS — N52.9 ERECTILE DYSFUNCTION, UNSPECIFIED ERECTILE DYSFUNCTION TYPE: ICD-10-CM

## 2018-03-13 PROCEDURE — 99214 OFFICE O/P EST MOD 30 MIN: CPT | Performed by: INTERNAL MEDICINE

## 2018-03-13 RX ORDER — FENOFIBRATE 145 MG/1
145 TABLET, COATED ORAL DAILY
Qty: 30 TABLET | Refills: 11 | Status: SHIPPED | OUTPATIENT
Start: 2018-03-13 | End: 2018-09-07 | Stop reason: ALTCHOICE

## 2018-03-13 RX ORDER — TADALAFIL 5 MG
TABLET ORAL
Qty: 90 TABLET | Refills: 3 | Status: SHIPPED | OUTPATIENT
Start: 2018-03-13 | End: 2019-01-10 | Stop reason: SDUPTHER

## 2018-03-13 RX ORDER — CANAGLIFLOZIN 300 MG/1
300 TABLET, FILM COATED ORAL DAILY
Qty: 30 TABLET | Refills: 5 | Status: SHIPPED | OUTPATIENT
Start: 2018-03-13 | End: 2018-09-07 | Stop reason: ALTCHOICE

## 2018-03-13 RX ORDER — OLMESARTAN MEDOXOMIL, AMLODIPINE AND HYDROCHLOROTHIAZIDE TABLET 40/10/25 MG 40; 10; 25 MG/1; MG/1; MG/1
1 TABLET ORAL DAILY
Qty: 30 TABLET | Refills: 5 | Status: SHIPPED | OUTPATIENT
Start: 2018-03-13 | End: 2018-09-07

## 2018-03-13 RX ORDER — ALLOPURINOL 100 MG/1
100 TABLET ORAL DAILY
Qty: 90 TABLET | Refills: 3 | Status: SHIPPED | OUTPATIENT
Start: 2018-03-13 | End: 2018-09-07 | Stop reason: ALTCHOICE

## 2018-03-13 RX ORDER — TESTOSTERONE 16.2 MG/G
GEL TRANSDERMAL
Qty: 150 G | Refills: 5 | Status: SHIPPED | OUTPATIENT
Start: 2018-03-13 | End: 2018-09-07 | Stop reason: SDUPTHER

## 2018-03-13 RX ORDER — ERGOCALCIFEROL 1.25 MG/1
50000 CAPSULE ORAL WEEKLY
Qty: 13 CAPSULE | Refills: 3 | Status: SHIPPED | OUTPATIENT
Start: 2018-03-13 | End: 2018-09-07 | Stop reason: SDUPTHER

## 2018-03-13 RX ORDER — EZETIMIBE 10 MG/1
10 TABLET ORAL DAILY
Qty: 90 TABLET | Refills: 3 | Status: SHIPPED | OUTPATIENT
Start: 2018-03-13 | End: 2018-09-07 | Stop reason: SDUPTHER

## 2018-03-13 NOTE — PROGRESS NOTES
"Subjective   Randy Mccann is a 77 y.o. male seen for follow up for DM2, hyperlipidemia, HTN, testosterone deficiency, vit d deficiency, lab review. Patient is not checking BG. He denies any problems or concerns.     History of Present Illness this is a 77-year-old gentleman known patient with type II diabetes hypertension and dyslipidemia as well as vitamin D deficiency and hyperuricemia with low testosterone and erectile dysfunction.  Over the course of last 6 months he has had no significant health problems for which to go to the emergency room or hospital.    /80   Resp 16   Ht 172.7 cm (68\")   Wt 86.1 kg (189 lb 12.8 oz)   BMI 28.86 kg/m²      Allergies   Allergen Reactions   • Statins Other (See Comments)     Cramps,joint pain  Cramps,joint pain  Cramps,joint pain       Current Outpatient Prescriptions:   •  allopurinol (ZYLOPRIM) 100 MG tablet, Take 1 tablet by mouth Daily., Disp: 90 tablet, Rfl: 3  •  CIALIS 5 MG tablet, Take 1 tablet by mouth Daily As Needed for erectile dysfunction., Disp: 90 tablet, Rfl: 3  •  CIALIS 5 MG tablet, TAKE 1 TABLET BY MOUTH DAILY AS NEEDED FOR ERECTILE DYSFUNCTION, Disp: 90 tablet, Rfl: 0  •  doxylamine (UNISOM) 25 MG tablet, Take 1 tablet by mouth nightly., Disp: , Rfl:   •  ergocalciferol (ERGOCALCIFEROL) 16082 units capsule, Take 1 capsule by mouth 1 (One) Time Per Week., Disp: 13 capsule, Rfl: 3  •  fenofibrate (TRICOR) 145 MG tablet, Take 1 tablet by mouth Daily., Disp: 30 tablet, Rfl: 11  •  INVOKANA 300 MG tablet, Take 300 mg by mouth Daily., Disp: 30 tablet, Rfl: 5  •  Multiple Vitamins-Minerals (MULTI VITAMIN/MINERALS) tablet, Take 1 tablet by mouth daily., Disp: , Rfl:   •  Olmesartan-Amlodipine-HCTZ 40-10-25 MG tablet, Take 1 tablet by mouth Daily., Disp: 30 tablet, Rfl: 5  •  testosterone (ANDRODERM) 4 MG/24HR patch 24 hour 24 hour patch, Place 1 patch on the skin Every Night., Disp: 30 patch, Rfl: 5  •  ZETIA 10 MG tablet, Take 1 tablet by mouth Daily., " Disp: 90 tablet, Rfl: 3      The following portions of the patient's history were reviewed and updated as appropriate: allergies, current medications, past family history, past medical history, past social history, past surgical history and problem list.    Review of Systems   Constitutional: Negative.    HENT: Negative.    Eyes: Negative.    Respiratory: Negative.    Cardiovascular: Negative.    Gastrointestinal: Negative.    Endocrine: Negative.    Genitourinary: Negative.    Musculoskeletal: Negative.    Skin: Negative.    Allergic/Immunologic: Negative.    Neurological: Negative.    Hematological: Negative.    Psychiatric/Behavioral: Negative.        Objective   Physical Exam   Constitutional: He is oriented to person, place, and time. He appears well-developed and well-nourished. No distress.   HENT:   Head: Normocephalic and atraumatic.   Right Ear: External ear normal.   Left Ear: External ear normal.   Nose: Nose normal.   Mouth/Throat: Oropharynx is clear and moist. No oropharyngeal exudate.   Eyes: Conjunctivae and EOM are normal. Pupils are equal, round, and reactive to light. Right eye exhibits no discharge. Left eye exhibits no discharge. No scleral icterus.   Neck: Normal range of motion. Neck supple. No JVD present. No tracheal deviation present. No thyromegaly present.   Cardiovascular: Normal rate, regular rhythm, normal heart sounds and intact distal pulses.  Exam reveals no gallop and no friction rub.    No murmur heard.  Pulmonary/Chest: Effort normal and breath sounds normal. No stridor. No respiratory distress. He has no wheezes. He has no rales. He exhibits no tenderness.   Abdominal: Soft. Bowel sounds are normal. He exhibits no distension and no mass. There is no tenderness. There is no rebound and no guarding. No hernia.   Musculoskeletal: Normal range of motion. He exhibits no edema, tenderness or deformity.   Lymphadenopathy:     He has no cervical adenopathy.   Neurological: He is alert  and oriented to person, place, and time. He has normal reflexes. He displays normal reflexes. No cranial nerve deficit. He exhibits normal muscle tone. Coordination normal.   Skin: Skin is warm. No rash noted. He is not diaphoretic. No erythema. No pallor.   Psychiatric: He has a normal mood and affect. His behavior is normal. Judgment and thought content normal.   Nursing note and vitals reviewed.    Results for orders placed or performed in visit on 02/07/18   T4 & TSH ([facility])   Result Value Ref Range    TSH 3.980 0.270 - 4.200 mIU/mL    T4, Total 6.79 4.50 - 11.70 mcg/dL   Uric Acid   Result Value Ref Range    Uric Acid 6.7 3.4 - 7.0 mg/dL   TestT+TestF+SHBG   Result Value Ref Range    Testosterone, Total 105 (L) 264 - 916 ng/dL    Testosterone, Free 5.1 (L) 6.6 - 18.1 pg/mL    Sex Hormone Binding Globulin 26.9 19.3 - 76.4 nmol/L   Vitamin D 25 Hydroxy   Result Value Ref Range    25 Hydroxy, Vitamin D 44.3 30.0 - 100.0 ng/ml   Comprehensive Metabolic Panel   Result Value Ref Range    Glucose 132 (H) 65 - 99 mg/dL    BUN 17 8 - 23 mg/dL    Creatinine 1.31 (H) 0.76 - 1.27 mg/dL    eGFR Non African Am 53 (L) >60 mL/min/1.73    eGFR African Am 65 >60 mL/min/1.73    BUN/Creatinine Ratio 13.0 7.0 - 25.0    Sodium 141 136 - 145 mmol/L    Potassium 4.5 3.5 - 5.2 mmol/L    Chloride 101 98 - 107 mmol/L    Total CO2 31.2 (H) 22.0 - 29.0 mmol/L    Calcium 9.6 8.6 - 10.5 mg/dL    Total Protein 7.0 6.0 - 8.5 g/dL    Albumin 4.70 3.50 - 5.20 g/dL    Globulin 2.3 gm/dL    A/G Ratio 2.0 g/dL    Total Bilirubin 0.8 0.1 - 1.2 mg/dL    Alkaline Phosphatase 61 39 - 117 U/L    AST (SGOT) 27 1 - 40 U/L    ALT (SGPT) 28 1 - 41 U/L   C-Peptide   Result Value Ref Range    C-Peptide 3.3 1.1 - 4.4 ng/mL   Hemoglobin A1c   Result Value Ref Range    Hemoglobin A1C 6.30 (H) 4.80 - 5.60 %   Lipid Panel   Result Value Ref Range    Total Cholesterol 214 (H) 0 - 200 mg/dL    Triglycerides 164 (H) 0 - 150 mg/dL    HDL Cholesterol 54 40 - 60 mg/dL     VLDL Cholesterol 32.8 5 - 40 mg/dL    LDL Cholesterol  127 (H) 0 - 100 mg/dL   Hemoglobin & Hematocrit, Blood   Result Value Ref Range    Hemoglobin 15.2 13.7 - 17.6 g/dL    Hematocrit 45.5 40.4 - 52.2 %   MicroAlbumin, Urine, Random   Result Value Ref Range    Microalbumin, Urine <3.0 Not Estab. ug/mL   Cardiovascular Risk Assessment   Result Value Ref Range    Interpretation Note    Diabetes Patient Education   Result Value Ref Range    PDF Image Not applicable          Assessment/Plan   Diagnoses and all orders for this visit:    Controlled type 2 diabetes mellitus without complication, without long-term current use of insulin  -     T4 & TSH ([facility]); Future  -     TestT+TestF+SHBG; Future  -     Uric Acid; Future  -     Vitamin D 25 Hydroxy; Future  -     Comprehensive Metabolic Panel; Future  -     PSA DIAGNOSTIC; Future  -     MicroAlbumin, Urine, Random - Urine, Clean Catch; Future  -     Lipid Panel; Future  -     Hemoglobin A1c; Future  -     C-Peptide; Future  -     Hemoglobin & Hematocrit, Blood; Future    Low testosterone  -     T4 & TSH ([facility]); Future  -     TestT+TestF+SHBG; Future  -     Uric Acid; Future  -     Vitamin D 25 Hydroxy; Future  -     Comprehensive Metabolic Panel; Future  -     PSA DIAGNOSTIC; Future  -     MicroAlbumin, Urine, Random - Urine, Clean Catch; Future  -     Lipid Panel; Future  -     Hemoglobin A1c; Future  -     C-Peptide; Future  -     Hemoglobin & Hematocrit, Blood; Future  -     ZETIA 10 MG tablet; Take 1 tablet by mouth Daily.  -     Olmesartan-Amlodipine-HCTZ 40-10-25 MG tablet; Take 1 tablet by mouth Daily.  -     ergocalciferol (ERGOCALCIFEROL) 23910 units capsule; Take 1 capsule by mouth 1 (One) Time Per Week.  -     CIALIS 5 MG tablet; Take 1 tablet daily by mouth  -     allopurinol (ZYLOPRIM) 100 MG tablet; Take 1 tablet by mouth Daily.    Vitamin D deficiency  -     T4 & TSH ([facility]); Future  -     TestT+TestF+SHBG; Future  -     Uric Acid; Future  -      Vitamin D 25 Hydroxy; Future  -     Comprehensive Metabolic Panel; Future  -     PSA DIAGNOSTIC; Future  -     MicroAlbumin, Urine, Random - Urine, Clean Catch; Future  -     Lipid Panel; Future  -     Hemoglobin A1c; Future  -     C-Peptide; Future  -     Hemoglobin & Hematocrit, Blood; Future  -     ZETIA 10 MG tablet; Take 1 tablet by mouth Daily.  -     Olmesartan-Amlodipine-HCTZ 40-10-25 MG tablet; Take 1 tablet by mouth Daily.  -     ergocalciferol (ERGOCALCIFEROL) 09776 units capsule; Take 1 capsule by mouth 1 (One) Time Per Week.  -     CIALIS 5 MG tablet; Take 1 tablet daily by mouth  -     allopurinol (ZYLOPRIM) 100 MG tablet; Take 1 tablet by mouth Daily.    Essential hypertension  -     T4 & TSH ([facility]); Future  -     TestT+TestF+SHBG; Future  -     Uric Acid; Future  -     Vitamin D 25 Hydroxy; Future  -     Comprehensive Metabolic Panel; Future  -     PSA DIAGNOSTIC; Future  -     MicroAlbumin, Urine, Random - Urine, Clean Catch; Future  -     Lipid Panel; Future  -     Hemoglobin A1c; Future  -     C-Peptide; Future  -     Hemoglobin & Hematocrit, Blood; Future  -     ZETIA 10 MG tablet; Take 1 tablet by mouth Daily.  -     Olmesartan-Amlodipine-HCTZ 40-10-25 MG tablet; Take 1 tablet by mouth Daily.  -     ergocalciferol (ERGOCALCIFEROL) 41283 units capsule; Take 1 capsule by mouth 1 (One) Time Per Week.  -     CIALIS 5 MG tablet; Take 1 tablet daily by mouth  -     allopurinol (ZYLOPRIM) 100 MG tablet; Take 1 tablet by mouth Daily.    Erectile dysfunction, unspecified erectile dysfunction type  -     T4 & TSH ([facility]); Future  -     TestT+TestF+SHBG; Future  -     Uric Acid; Future  -     Vitamin D 25 Hydroxy; Future  -     Comprehensive Metabolic Panel; Future  -     PSA DIAGNOSTIC; Future  -     MicroAlbumin, Urine, Random - Urine, Clean Catch; Future  -     Lipid Panel; Future  -     Hemoglobin A1c; Future  -     C-Peptide; Future  -     Hemoglobin & Hematocrit, Blood; Future    Benign  non-nodular prostatic hyperplasia without lower urinary tract symptoms  -     T4 & TSH ([facility]); Future  -     TestT+TestF+SHBG; Future  -     Uric Acid; Future  -     Vitamin D 25 Hydroxy; Future  -     Comprehensive Metabolic Panel; Future  -     PSA DIAGNOSTIC; Future  -     MicroAlbumin, Urine, Random - Urine, Clean Catch; Future  -     Lipid Panel; Future  -     Hemoglobin A1c; Future  -     C-Peptide; Future  -     Hemoglobin & Hematocrit, Blood; Future    Other male erectile dysfunction  -     ZETIA 10 MG tablet; Take 1 tablet by mouth Daily.  -     Olmesartan-Amlodipine-HCTZ 40-10-25 MG tablet; Take 1 tablet by mouth Daily.  -     ergocalciferol (ERGOCALCIFEROL) 20663 units capsule; Take 1 capsule by mouth 1 (One) Time Per Week.  -     CIALIS 5 MG tablet; Take 1 tablet daily by mouth  -     allopurinol (ZYLOPRIM) 100 MG tablet; Take 1 tablet by mouth Daily.    Dyslipidemia  -     ZETIA 10 MG tablet; Take 1 tablet by mouth Daily.  -     Olmesartan-Amlodipine-HCTZ 40-10-25 MG tablet; Take 1 tablet by mouth Daily.  -     ergocalciferol (ERGOCALCIFEROL) 52839 units capsule; Take 1 capsule by mouth 1 (One) Time Per Week.  -     CIALIS 5 MG tablet; Take 1 tablet daily by mouth  -     allopurinol (ZYLOPRIM) 100 MG tablet; Take 1 tablet by mouth Daily.    Hyperuricemia  -     ZETIA 10 MG tablet; Take 1 tablet by mouth Daily.  -     Olmesartan-Amlodipine-HCTZ 40-10-25 MG tablet; Take 1 tablet by mouth Daily.  -     ergocalciferol (ERGOCALCIFEROL) 96833 units capsule; Take 1 capsule by mouth 1 (One) Time Per Week.  -     CIALIS 5 MG tablet; Take 1 tablet daily by mouth  -     allopurinol (ZYLOPRIM) 100 MG tablet; Take 1 tablet by mouth Daily.    Hyperglycemia  -     ZETIA 10 MG tablet; Take 1 tablet by mouth Daily.  -     Olmesartan-Amlodipine-HCTZ 40-10-25 MG tablet; Take 1 tablet by mouth Daily.  -     ergocalciferol (ERGOCALCIFEROL) 38253 units capsule; Take 1 capsule by mouth 1 (One) Time Per Week.  -     CIALIS  5 MG tablet; Take 1 tablet daily by mouth  -     allopurinol (ZYLOPRIM) 100 MG tablet; Take 1 tablet by mouth Daily.    Other orders  -     ANDROGEL 20.25 MG/1.25GM (1.62%) gel; 2 pump actuation on each shoulder daily.  -     INVOKANA 300 MG tablet; Take 300 mg by mouth Daily.  -     fenofibrate (TRICOR) 145 MG tablet; Take 1 tablet by mouth Daily.               His summary I saw and examined this 77-year-old gentleman for above-mentioned problems.  I reviewed his laboratory evaluation of 02/27/2018 and provided her with a hard copy of it.  He is clinically and metabolically stable and therefore we will go ahead and continue all his current prescriptions.  I will see him in 6 months or sooner if needed with laboratory evaluation prior to each office visit.

## 2018-08-10 ENCOUNTER — INPATIENT HOSPITAL (OUTPATIENT)
Dept: URBAN - METROPOLITAN AREA HOSPITAL 90 | Facility: HOSPITAL | Age: 78
End: 2018-08-10

## 2018-08-10 DIAGNOSIS — K92.1 MELENA: ICD-10-CM

## 2018-08-10 DIAGNOSIS — K57.30 DIVERTICULOSIS OF LARGE INTESTINE WITHOUT PERFORATION OR ABS: ICD-10-CM

## 2018-08-10 DIAGNOSIS — Z53.8 PROCEDURE AND TREATMENT NOT CARRIED OUT FOR OTHER REASONS: ICD-10-CM

## 2018-08-10 DIAGNOSIS — K64.8 OTHER HEMORRHOIDS: ICD-10-CM

## 2018-08-10 DIAGNOSIS — Z85.038 PERSONAL HISTORY OF OTHER MALIGNANT NEOPLASM OF LARGE INTEST: ICD-10-CM

## 2018-08-10 DIAGNOSIS — Z90.49 ACQUIRED ABSENCE OF OTHER SPECIFIED PARTS OF DIGESTIVE TRACT: ICD-10-CM

## 2018-08-10 DIAGNOSIS — D50.0 IRON DEFICIENCY ANEMIA SECONDARY TO BLOOD LOSS (CHRONIC): ICD-10-CM

## 2018-08-10 PROCEDURE — 45378 DIAGNOSTIC COLONOSCOPY: CPT | Mod: 53

## 2018-08-11 ENCOUNTER — INPATIENT HOSPITAL (OUTPATIENT)
Dept: URBAN - METROPOLITAN AREA HOSPITAL 90 | Facility: HOSPITAL | Age: 78
End: 2018-08-11

## 2018-08-11 DIAGNOSIS — R10.30 LOWER ABDOMINAL PAIN, UNSPECIFIED: ICD-10-CM

## 2018-08-11 DIAGNOSIS — K92.1 MELENA: ICD-10-CM

## 2018-08-11 DIAGNOSIS — Z85.038 PERSONAL HISTORY OF OTHER MALIGNANT NEOPLASM OF LARGE INTEST: ICD-10-CM

## 2018-08-11 DIAGNOSIS — Z90.49 ACQUIRED ABSENCE OF OTHER SPECIFIED PARTS OF DIGESTIVE TRACT: ICD-10-CM

## 2018-08-11 DIAGNOSIS — D50.0 IRON DEFICIENCY ANEMIA SECONDARY TO BLOOD LOSS (CHRONIC): ICD-10-CM

## 2018-08-11 PROCEDURE — 99232 SBSQ HOSP IP/OBS MODERATE 35: CPT

## 2018-08-15 ENCOUNTER — INPATIENT HOSPITAL (OUTPATIENT)
Dept: URBAN - METROPOLITAN AREA HOSPITAL 107 | Facility: HOSPITAL | Age: 78
End: 2018-08-15

## 2018-08-15 DIAGNOSIS — Z85.038 PERSONAL HISTORY OF OTHER MALIGNANT NEOPLASM OF LARGE INTEST: ICD-10-CM

## 2018-08-15 DIAGNOSIS — R10.30 LOWER ABDOMINAL PAIN, UNSPECIFIED: ICD-10-CM

## 2018-08-15 DIAGNOSIS — K62.5 HEMORRHAGE OF ANUS AND RECTUM: ICD-10-CM

## 2018-08-15 DIAGNOSIS — K57.31 DIVERTICULOSIS OF LARGE INTESTINE WITHOUT PERFORATION OR ABS: ICD-10-CM

## 2018-08-15 PROCEDURE — 99223 1ST HOSP IP/OBS HIGH 75: CPT | Performed by: INTERNAL MEDICINE

## 2018-08-28 ENCOUNTER — RESULTS ENCOUNTER (OUTPATIENT)
Dept: ENDOCRINOLOGY | Age: 78
End: 2018-08-28

## 2018-08-28 DIAGNOSIS — R79.89 LOW TESTOSTERONE: ICD-10-CM

## 2018-08-28 DIAGNOSIS — I10 ESSENTIAL HYPERTENSION: ICD-10-CM

## 2018-08-28 DIAGNOSIS — E55.9 VITAMIN D DEFICIENCY: ICD-10-CM

## 2018-08-28 DIAGNOSIS — N40.0 BENIGN NON-NODULAR PROSTATIC HYPERPLASIA WITHOUT LOWER URINARY TRACT SYMPTOMS: ICD-10-CM

## 2018-08-28 DIAGNOSIS — N52.9 ERECTILE DYSFUNCTION, UNSPECIFIED ERECTILE DYSFUNCTION TYPE: ICD-10-CM

## 2018-08-28 DIAGNOSIS — E11.9 CONTROLLED TYPE 2 DIABETES MELLITUS WITHOUT COMPLICATION, WITHOUT LONG-TERM CURRENT USE OF INSULIN (HCC): ICD-10-CM

## 2018-08-31 ENCOUNTER — LAB (OUTPATIENT)
Dept: ENDOCRINOLOGY | Age: 78
End: 2018-08-31

## 2018-08-31 DIAGNOSIS — N40.0 BENIGN NON-NODULAR PROSTATIC HYPERPLASIA WITHOUT LOWER URINARY TRACT SYMPTOMS: ICD-10-CM

## 2018-08-31 DIAGNOSIS — N52.9 ERECTILE DYSFUNCTION, UNSPECIFIED ERECTILE DYSFUNCTION TYPE: ICD-10-CM

## 2018-08-31 DIAGNOSIS — E55.9 VITAMIN D DEFICIENCY: ICD-10-CM

## 2018-08-31 DIAGNOSIS — R79.89 LOW TESTOSTERONE: ICD-10-CM

## 2018-08-31 DIAGNOSIS — E11.9 CONTROLLED TYPE 2 DIABETES MELLITUS WITHOUT COMPLICATION, WITHOUT LONG-TERM CURRENT USE OF INSULIN (HCC): ICD-10-CM

## 2018-08-31 DIAGNOSIS — I10 ESSENTIAL HYPERTENSION: ICD-10-CM

## 2018-09-02 LAB
25(OH)D3+25(OH)D2 SERPL-MCNC: 37.6 NG/ML (ref 30–100)
ALBUMIN SERPL-MCNC: 4 G/DL (ref 3.5–5.2)
ALBUMIN/GLOB SERPL: 1.5 G/DL
ALP SERPL-CCNC: 89 U/L (ref 39–117)
ALT SERPL-CCNC: 26 U/L (ref 1–41)
AST SERPL-CCNC: 21 U/L (ref 1–40)
BILIRUB SERPL-MCNC: 0.7 MG/DL (ref 0.1–1.2)
BUN SERPL-MCNC: 21 MG/DL (ref 8–23)
BUN/CREAT SERPL: 15.6 (ref 7–25)
C PEPTIDE SERPL-MCNC: 3.5 NG/ML (ref 1.1–4.4)
CALCIUM SERPL-MCNC: 9.1 MG/DL (ref 8.6–10.5)
CHLORIDE SERPL-SCNC: 96 MMOL/L (ref 98–107)
CHOLEST SERPL-MCNC: 93 MG/DL (ref 0–200)
CO2 SERPL-SCNC: 28.5 MMOL/L (ref 22–29)
CREAT SERPL-MCNC: 1.35 MG/DL (ref 0.76–1.27)
GLOBULIN SER CALC-MCNC: 2.6 GM/DL
GLUCOSE SERPL-MCNC: 137 MG/DL (ref 65–99)
HBA1C MFR BLD: 6.91 % (ref 4.8–5.6)
HCT VFR BLD AUTO: 31.5 % (ref 40.4–52.2)
HDLC SERPL-MCNC: 27 MG/DL (ref 40–60)
HGB BLD-MCNC: 9.6 G/DL (ref 13.7–17.6)
INTERPRETATION: NORMAL
LDLC SERPL CALC-MCNC: 51 MG/DL (ref 0–100)
Lab: NORMAL
MICROALBUMIN UR-MCNC: 13.2 UG/ML
POTASSIUM SERPL-SCNC: 4.1 MMOL/L (ref 3.5–5.2)
PROT SERPL-MCNC: 6.6 G/DL (ref 6–8.5)
PSA SERPL-MCNC: 2.99 NG/ML (ref 0–4)
SHBG SERPL-SCNC: 20.3 NMOL/L (ref 19.3–76.4)
SODIUM SERPL-SCNC: 138 MMOL/L (ref 136–145)
T4 SERPL-MCNC: 7.23 MCG/DL (ref 4.5–11.7)
TESTOST FREE SERPL-MCNC: 2.6 PG/ML (ref 6.6–18.1)
TESTOST SERPL-MCNC: 163 NG/DL (ref 264–916)
TRIGL SERPL-MCNC: 74 MG/DL (ref 0–150)
TSH SERPL DL<=0.005 MIU/L-ACNC: 4.25 MIU/ML (ref 0.27–4.2)
URATE SERPL-MCNC: 7.3 MG/DL (ref 3.4–7)
VLDLC SERPL CALC-MCNC: 14.8 MG/DL (ref 5–40)

## 2018-09-07 ENCOUNTER — OFFICE VISIT (OUTPATIENT)
Dept: ENDOCRINOLOGY | Age: 78
End: 2018-09-07

## 2018-09-07 VITALS
DIASTOLIC BLOOD PRESSURE: 78 MMHG | RESPIRATION RATE: 16 BRPM | SYSTOLIC BLOOD PRESSURE: 138 MMHG | HEIGHT: 67 IN | BODY MASS INDEX: 29.19 KG/M2 | WEIGHT: 186 LBS

## 2018-09-07 DIAGNOSIS — R79.89 LOW TESTOSTERONE: ICD-10-CM

## 2018-09-07 DIAGNOSIS — I10 ESSENTIAL HYPERTENSION: ICD-10-CM

## 2018-09-07 DIAGNOSIS — E03.9 PRIMARY HYPOTHYROIDISM: ICD-10-CM

## 2018-09-07 DIAGNOSIS — E55.9 VITAMIN D DEFICIENCY: ICD-10-CM

## 2018-09-07 DIAGNOSIS — R73.9 HYPERGLYCEMIA: ICD-10-CM

## 2018-09-07 DIAGNOSIS — E79.0 HYPERURICEMIA: ICD-10-CM

## 2018-09-07 DIAGNOSIS — N52.9 ERECTILE DYSFUNCTION, UNSPECIFIED ERECTILE DYSFUNCTION TYPE: ICD-10-CM

## 2018-09-07 DIAGNOSIS — I10 BENIGN ESSENTIAL HYPERTENSION: ICD-10-CM

## 2018-09-07 DIAGNOSIS — E11.9 CONTROLLED TYPE 2 DIABETES MELLITUS WITHOUT COMPLICATION, WITHOUT LONG-TERM CURRENT USE OF INSULIN (HCC): Primary | ICD-10-CM

## 2018-09-07 DIAGNOSIS — N40.0 BENIGN NON-NODULAR PROSTATIC HYPERPLASIA WITHOUT LOWER URINARY TRACT SYMPTOMS: ICD-10-CM

## 2018-09-07 DIAGNOSIS — E78.5 DYSLIPIDEMIA: ICD-10-CM

## 2018-09-07 DIAGNOSIS — N52.8 OTHER MALE ERECTILE DYSFUNCTION: ICD-10-CM

## 2018-09-07 PROCEDURE — 99215 OFFICE O/P EST HI 40 MIN: CPT | Performed by: INTERNAL MEDICINE

## 2018-09-07 RX ORDER — WARFARIN SODIUM 2.5 MG/1
2.5 TABLET ORAL
COMMUNITY
End: 2019-11-26

## 2018-09-07 RX ORDER — EZETIMIBE 10 MG/1
10 TABLET ORAL DAILY
Qty: 90 TABLET | Refills: 3 | Status: SHIPPED | OUTPATIENT
Start: 2018-09-07 | End: 2019-04-11 | Stop reason: SDUPTHER

## 2018-09-07 RX ORDER — TESTOSTERONE 16.2 MG/G
GEL TRANSDERMAL
Qty: 150 G | Refills: 5 | Status: SHIPPED | OUTPATIENT
Start: 2018-09-07 | End: 2018-11-07

## 2018-09-07 RX ORDER — NEBIVOLOL HYDROCHLORIDE 20 MG/1
20 TABLET ORAL DAILY
Qty: 90 TABLET | Refills: 3 | Status: SHIPPED | OUTPATIENT
Start: 2018-09-07 | End: 2019-04-11 | Stop reason: SDUPTHER

## 2018-09-07 RX ORDER — FUROSEMIDE 40 MG/1
40 TABLET ORAL 2 TIMES DAILY
COMMUNITY
End: 2019-04-11 | Stop reason: ALTCHOICE

## 2018-09-07 RX ORDER — FEBUXOSTAT 40 MG/1
40 TABLET ORAL DAILY
Qty: 90 TABLET | Refills: 3 | Status: SHIPPED | OUTPATIENT
Start: 2018-09-07 | End: 2019-04-11 | Stop reason: SDUPTHER

## 2018-09-07 RX ORDER — LEVOTHYROXINE SODIUM 50 MCG
50 TABLET ORAL DAILY
Qty: 90 TABLET | Refills: 3 | Status: SHIPPED | OUTPATIENT
Start: 2018-09-07 | End: 2019-09-07

## 2018-09-07 RX ORDER — TRAMADOL HYDROCHLORIDE 50 MG/1
50 TABLET ORAL EVERY 6 HOURS PRN
COMMUNITY
End: 2019-04-11 | Stop reason: ALTCHOICE

## 2018-09-07 RX ORDER — LANOLIN ALCOHOL/MO/W.PET/CERES
325 CREAM (GRAM) TOPICAL 2 TIMES DAILY
Qty: 180 TABLET | Refills: 3 | Status: SHIPPED | OUTPATIENT
Start: 2018-09-07 | End: 2019-04-11 | Stop reason: ALTCHOICE

## 2018-09-07 RX ORDER — ERGOCALCIFEROL 1.25 MG/1
50000 CAPSULE ORAL WEEKLY
Qty: 13 CAPSULE | Refills: 3 | Status: SHIPPED | OUTPATIENT
Start: 2018-09-07 | End: 2019-04-11 | Stop reason: SDUPTHER

## 2018-09-07 RX ORDER — ASPIRIN 81 MG/1
81 TABLET ORAL DAILY
COMMUNITY

## 2018-09-07 NOTE — PROGRESS NOTES
"Subjective   Randy Mccann is a 78 y.o. male seen for follow up for DM2, hyperlipidemia, HTN, testosterone deficiency, vit d deficiency, lab review. He states that he had heart surgery on 08/15/2018. He denies any problems or concerns. He would like to have his medications discussed with his cardiologist.     History of Present Illness this is a 78-year-old gentleman known patient with type II diabetes hypertension and dyslipidemia as well as erectile dysfunction and hypogonadism and vitamin D deficiency.  He is status post mitral valve replacement surgery on 08/15/2018.    /78   Resp 16   Ht 170.2 cm (67\")   Wt 84.4 kg (186 lb)   BMI 29.13 kg/m²      Allergies   Allergen Reactions   • Statins Other (See Comments)     Cramps,joint pain  Cramps,joint pain  Cramps,joint pain       Current Outpatient Prescriptions:   •  ANDROGEL 20.25 MG/1.25GM (1.62%) gel, 2 pump actuation on each shoulder daily., Disp: 150 g, Rfl: 5  •  aspirin 81 MG EC tablet, Take 81 mg by mouth Daily., Disp: , Rfl:   •  CIALIS 5 MG tablet, Take 1 tablet daily by mouth, Disp: 90 tablet, Rfl: 3  •  ergocalciferol (ERGOCALCIFEROL) 23877 units capsule, Take 1 capsule by mouth 1 (One) Time Per Week., Disp: 13 capsule, Rfl: 3  •  furosemide (LASIX) 40 MG tablet, Take 40 mg by mouth 2 (Two) Times a Day., Disp: , Rfl:   •  Multiple Vitamins-Minerals (MULTI VITAMIN/MINERALS) tablet, Take 1 tablet by mouth daily., Disp: , Rfl:   •  Olmesartan-Amlodipine-HCTZ 40-10-25 MG tablet, Take 1 tablet by mouth Daily., Disp: 30 tablet, Rfl: 5  •  traMADol (ULTRAM) 50 MG tablet, Take 50 mg by mouth Every 6 (Six) Hours As Needed for Moderate Pain ., Disp: , Rfl:   •  warfarin (COUMADIN) 2.5 MG tablet, Take 2.5 mg by mouth Daily., Disp: , Rfl:   •  ZETIA 10 MG tablet, Take 1 tablet by mouth Daily., Disp: 90 tablet, Rfl: 3      The following portions of the patient's history were reviewed and updated as appropriate: allergies, current medications, past family " history, past medical history, past social history, past surgical history and problem list.    Review of Systems   Constitutional: Negative.    HENT: Negative.    Eyes: Negative.    Respiratory: Negative.    Cardiovascular: Negative.    Gastrointestinal: Negative.    Endocrine: Negative.    Genitourinary: Negative.    Musculoskeletal: Negative.    Skin: Negative.    Allergic/Immunologic: Negative.    Neurological: Negative.    Hematological: Negative.    Psychiatric/Behavioral: Negative.        Objective   Physical Exam   Constitutional: He is oriented to person, place, and time. He appears well-developed and well-nourished. No distress.   HENT:   Head: Normocephalic and atraumatic.   Right Ear: External ear normal.   Left Ear: External ear normal.   Nose: Nose normal.   Mouth/Throat: Oropharynx is clear and moist. No oropharyngeal exudate.   Eyes: Pupils are equal, round, and reactive to light. Conjunctivae and EOM are normal. Right eye exhibits no discharge. Left eye exhibits no discharge. No scleral icterus.   Neck: Normal range of motion. Neck supple. No JVD present. No tracheal deviation present. No thyromegaly present.   Cardiovascular: Normal rate, regular rhythm, normal heart sounds and intact distal pulses.  Exam reveals no gallop and no friction rub.    No murmur heard.  Pulmonary/Chest: Effort normal and breath sounds normal. No stridor. No respiratory distress. He has no wheezes. He has no rales. He exhibits no tenderness.   Abdominal: Soft. Bowel sounds are normal. He exhibits no distension and no mass. There is no tenderness. There is no rebound and no guarding. No hernia.   Musculoskeletal: Normal range of motion. He exhibits no edema, tenderness or deformity.   Lymphadenopathy:     He has no cervical adenopathy.   Neurological: He is alert and oriented to person, place, and time. He has normal reflexes. He displays normal reflexes. No cranial nerve deficit or sensory deficit. He exhibits normal  muscle tone. Coordination normal.   Skin: Skin is warm. No rash noted. He is not diaphoretic. No erythema. No pallor.   Psychiatric: He has a normal mood and affect. His behavior is normal. Judgment and thought content normal.   Nursing note and vitals reviewed.       Results for orders placed or performed in visit on 08/28/18   T4 & TSH ([facility])   Result Value Ref Range    TSH 4.250 (H) 0.270 - 4.200 mIU/mL    T4, Total 7.23 4.50 - 11.70 mcg/dL   TestT+TestF+SHBG   Result Value Ref Range    Testosterone, Total 163 (L) 264 - 916 ng/dL    Testosterone, Free 2.6 (L) 6.6 - 18.1 pg/mL    Sex Hormone Binding Globulin 20.3 19.3 - 76.4 nmol/L   Uric Acid   Result Value Ref Range    Uric Acid 7.3 (H) 3.4 - 7.0 mg/dL   Vitamin D 25 Hydroxy   Result Value Ref Range    25 Hydroxy, Vitamin D 37.6 30.0 - 100.0 ng/ml   Comprehensive Metabolic Panel   Result Value Ref Range    Glucose 137 (H) 65 - 99 mg/dL    BUN 21 8 - 23 mg/dL    Creatinine 1.35 (H) 0.76 - 1.27 mg/dL    eGFR Non African Am 51 (L) >60 mL/min/1.73    eGFR African Am 62 >60 mL/min/1.73    BUN/Creatinine Ratio 15.6 7.0 - 25.0    Sodium 138 136 - 145 mmol/L    Potassium 4.1 3.5 - 5.2 mmol/L    Chloride 96 (L) 98 - 107 mmol/L    Total CO2 28.5 22.0 - 29.0 mmol/L    Calcium 9.1 8.6 - 10.5 mg/dL    Total Protein 6.6 6.0 - 8.5 g/dL    Albumin 4.00 3.50 - 5.20 g/dL    Globulin 2.6 gm/dL    A/G Ratio 1.5 g/dL    Total Bilirubin 0.7 0.1 - 1.2 mg/dL    Alkaline Phosphatase 89 39 - 117 U/L    AST (SGOT) 21 1 - 40 U/L    ALT (SGPT) 26 1 - 41 U/L   PSA DIAGNOSTIC   Result Value Ref Range    PSA 2.990 0.000 - 4.000 ng/mL   Lipid Panel   Result Value Ref Range    Total Cholesterol 93 0 - 200 mg/dL    Triglycerides 74 0 - 150 mg/dL    HDL Cholesterol 27 (L) 40 - 60 mg/dL    VLDL Cholesterol 14.8 5 - 40 mg/dL    LDL Cholesterol  51 0 - 100 mg/dL   Hemoglobin A1c   Result Value Ref Range    Hemoglobin A1C 6.91 (H) 4.80 - 5.60 %   C-Peptide   Result Value Ref Range    C-Peptide 3.5  1.1 - 4.4 ng/mL   Hemoglobin & Hematocrit, Blood   Result Value Ref Range    Hemoglobin 9.6 (L) 13.7 - 17.6 g/dL    Hematocrit 31.5 (L) 40.4 - 52.2 %   MicroAlbumin, Urine, Random   Result Value Ref Range    Microalbumin, Urine 13.2 Not Estab. ug/mL   Cardiovascular Risk Assessment   Result Value Ref Range    Interpretation Note    Diabetes Patient Education   Result Value Ref Range    PDF Image Not applicable          Assessment/Plan   Diagnoses and all orders for this visit:    Controlled type 2 diabetes mellitus without complication, without long-term current use of insulin (CMS/[facility])  -     T4 & TSH ([facility]); Future  -     T3, Free; Future  -     T4, Free; Future  -     Thyroglobulin With Anti-TG; Future  -     Uric Acid; Future  -     Vitamin D 25 Hydroxy; Future  -     CBC & Differential; Future  -     Comprehensive Metabolic Panel; Future  -     C-Peptide; Future  -     Hemoglobin A1c; Future  -     Lipid Panel; Future  -     PSA DIAGNOSTIC; Future    Benign essential hypertension  -     T4 & TSH ([facility]); Future  -     T3, Free; Future  -     T4, Free; Future  -     Thyroglobulin With Anti-TG; Future  -     Uric Acid; Future  -     Vitamin D 25 Hydroxy; Future  -     CBC & Differential; Future  -     Comprehensive Metabolic Panel; Future  -     C-Peptide; Future  -     Hemoglobin A1c; Future  -     Lipid Panel; Future  -     PSA DIAGNOSTIC; Future    Essential hypertension  -     ergocalciferol (ERGOCALCIFEROL) 19438 units capsule; Take 1 capsule by mouth 1 (One) Time Per Week.  -     ezetimibe (ZETIA) 10 MG tablet; Take 1 tablet by mouth Daily.  -     T4 & TSH ([facility]); Future  -     T3, Free; Future  -     T4, Free; Future  -     Thyroglobulin With Anti-TG; Future  -     Uric Acid; Future  -     Vitamin D 25 Hydroxy; Future  -     CBC & Differential; Future  -     Comprehensive Metabolic Panel; Future  -     C-Peptide; Future  -     Hemoglobin A1c; Future  -     Lipid Panel; Future  -     PSA DIAGNOSTIC;  Future    Vitamin D deficiency  -     ergocalciferol (ERGOCALCIFEROL) 98905 units capsule; Take 1 capsule by mouth 1 (One) Time Per Week.  -     ezetimibe (ZETIA) 10 MG tablet; Take 1 tablet by mouth Daily.  -     T4 & TSH ([facility]); Future  -     T3, Free; Future  -     T4, Free; Future  -     Thyroglobulin With Anti-TG; Future  -     Uric Acid; Future  -     Vitamin D 25 Hydroxy; Future  -     CBC & Differential; Future  -     Comprehensive Metabolic Panel; Future  -     C-Peptide; Future  -     Hemoglobin A1c; Future  -     Lipid Panel; Future  -     PSA DIAGNOSTIC; Future    Low testosterone  -     ergocalciferol (ERGOCALCIFEROL) 45309 units capsule; Take 1 capsule by mouth 1 (One) Time Per Week.  -     ezetimibe (ZETIA) 10 MG tablet; Take 1 tablet by mouth Daily.  -     T4 & TSH ([facility]); Future  -     T3, Free; Future  -     T4, Free; Future  -     Thyroglobulin With Anti-TG; Future  -     Uric Acid; Future  -     Vitamin D 25 Hydroxy; Future  -     CBC & Differential; Future  -     Comprehensive Metabolic Panel; Future  -     C-Peptide; Future  -     Hemoglobin A1c; Future  -     Lipid Panel; Future  -     PSA DIAGNOSTIC; Future    Erectile dysfunction, unspecified erectile dysfunction type  -     T4 & TSH ([facility]); Future  -     T3, Free; Future  -     T4, Free; Future  -     Thyroglobulin With Anti-TG; Future  -     Uric Acid; Future  -     Vitamin D 25 Hydroxy; Future  -     CBC & Differential; Future  -     Comprehensive Metabolic Panel; Future  -     C-Peptide; Future  -     Hemoglobin A1c; Future  -     Lipid Panel; Future  -     PSA DIAGNOSTIC; Future    Dyslipidemia  -     ergocalciferol (ERGOCALCIFEROL) 12613 units capsule; Take 1 capsule by mouth 1 (One) Time Per Week.  -     ezetimibe (ZETIA) 10 MG tablet; Take 1 tablet by mouth Daily.  -     T4 & TSH ([facility]); Future  -     T3, Free; Future  -     T4, Free; Future  -     Thyroglobulin With Anti-TG; Future  -     Uric Acid; Future  -     Vitamin  D 25 Hydroxy; Future  -     CBC & Differential; Future  -     Comprehensive Metabolic Panel; Future  -     C-Peptide; Future  -     Hemoglobin A1c; Future  -     Lipid Panel; Future  -     PSA DIAGNOSTIC; Future    Hyperuricemia  -     ergocalciferol (ERGOCALCIFEROL) 13834 units capsule; Take 1 capsule by mouth 1 (One) Time Per Week.  -     ezetimibe (ZETIA) 10 MG tablet; Take 1 tablet by mouth Daily.  -     T4 & TSH ([facility]); Future  -     T3, Free; Future  -     T4, Free; Future  -     Thyroglobulin With Anti-TG; Future  -     Uric Acid; Future  -     Vitamin D 25 Hydroxy; Future  -     CBC & Differential; Future  -     Comprehensive Metabolic Panel; Future  -     C-Peptide; Future  -     Hemoglobin A1c; Future  -     Lipid Panel; Future  -     PSA DIAGNOSTIC; Future    Other male erectile dysfunction  -     ergocalciferol (ERGOCALCIFEROL) 24987 units capsule; Take 1 capsule by mouth 1 (One) Time Per Week.  -     ezetimibe (ZETIA) 10 MG tablet; Take 1 tablet by mouth Daily.  -     T4 & TSH ([facility]); Future  -     T3, Free; Future  -     T4, Free; Future  -     Thyroglobulin With Anti-TG; Future  -     Uric Acid; Future  -     Vitamin D 25 Hydroxy; Future  -     CBC & Differential; Future  -     Comprehensive Metabolic Panel; Future  -     C-Peptide; Future  -     Hemoglobin A1c; Future  -     Lipid Panel; Future  -     PSA DIAGNOSTIC; Future    Hyperglycemia  -     ergocalciferol (ERGOCALCIFEROL) 56764 units capsule; Take 1 capsule by mouth 1 (One) Time Per Week.  -     ezetimibe (ZETIA) 10 MG tablet; Take 1 tablet by mouth Daily.  -     T4 & TSH ([facility]); Future  -     T3, Free; Future  -     T4, Free; Future  -     Thyroglobulin With Anti-TG; Future  -     Uric Acid; Future  -     Vitamin D 25 Hydroxy; Future  -     CBC & Differential; Future  -     Comprehensive Metabolic Panel; Future  -     C-Peptide; Future  -     Hemoglobin A1c; Future  -     Lipid Panel; Future  -     PSA DIAGNOSTIC; Future    Primary  hypothyroidism  -     T4 & TSH ([facility]); Future  -     T3, Free; Future  -     T4, Free; Future  -     Thyroglobulin With Anti-TG; Future  -     Uric Acid; Future  -     Vitamin D 25 Hydroxy; Future  -     CBC & Differential; Future  -     Comprehensive Metabolic Panel; Future  -     C-Peptide; Future  -     Hemoglobin A1c; Future  -     Lipid Panel; Future  -     PSA DIAGNOSTIC; Future    Benign non-nodular prostatic hyperplasia without lower urinary tract symptoms  -     T4 & TSH ([facility]); Future  -     T3, Free; Future  -     T4, Free; Future  -     Thyroglobulin With Anti-TG; Future  -     Uric Acid; Future  -     Vitamin D 25 Hydroxy; Future  -     CBC & Differential; Future  -     Comprehensive Metabolic Panel; Future  -     C-Peptide; Future  -     Hemoglobin A1c; Future  -     Lipid Panel; Future  -     PSA DIAGNOSTIC; Future    Other orders  -     SYNTHROID 50 MCG tablet; Take 1 tablet by mouth Daily.  -     ULORIC 40 MG tablet; Take 1 tablet by mouth Daily.  -     ferrous sulfate 325 (65 FE) MG EC tablet; Take 1 tablet by mouth 2 (Two) Times a Day.  -     ANDROGEL 20.25 MG/1.25GM (1.62%) gel; 2 pump actuation on each shoulder daily.               In summary I saw and examined this 78-year-old gentleman for above-mentioned problems.  I reviewed his laboratory evaluation of 08/28/2018 and provided him with a hard copy of it.  He has a new diagnosis of hypothyroidism and his testosterone level is very low and also he is having anemia which more than likely is post operative in nature and also has hyperuricemia.  Therefore I am is starting him on Synthroid 50 µg daily as well as ferrous sulfate 325 mg twice daily and Uloric 40 mg daily.  He will continue rest of his prescriptions and I will see him in 6 months or sooner if needed with laboratory evaluation prior to each office visit.  This office visit lasted 40 minutes and 25 minutes of it was a spent on face-to-face counseling of the patient and review of  his hospital record and organizing the plan of his future care.

## 2018-09-09 DIAGNOSIS — E55.9 VITAMIN D DEFICIENCY: ICD-10-CM

## 2018-09-09 DIAGNOSIS — N52.8 OTHER MALE ERECTILE DYSFUNCTION: ICD-10-CM

## 2018-09-09 DIAGNOSIS — R79.89 LOW TESTOSTERONE: ICD-10-CM

## 2018-09-09 DIAGNOSIS — E78.5 DYSLIPIDEMIA: ICD-10-CM

## 2018-09-09 DIAGNOSIS — R73.9 HYPERGLYCEMIA: ICD-10-CM

## 2018-09-09 DIAGNOSIS — I10 ESSENTIAL HYPERTENSION: ICD-10-CM

## 2018-09-09 DIAGNOSIS — E79.0 HYPERURICEMIA: ICD-10-CM

## 2018-09-10 RX ORDER — TADALAFIL 5 MG
5 TABLET ORAL DAILY PRN
Qty: 90 TABLET | Refills: 0 | Status: SHIPPED | OUTPATIENT
Start: 2018-09-10 | End: 2019-01-10 | Stop reason: SDUPTHER

## 2018-11-07 RX ORDER — TESTOSTERONE GEL, 1% 10 MG/G
100 GEL TRANSDERMAL DAILY
Qty: 60 TUBE | Refills: 5 | Status: SHIPPED | OUTPATIENT
Start: 2018-11-07 | End: 2019-04-11 | Stop reason: SDUPTHER

## 2019-01-10 DIAGNOSIS — E55.9 VITAMIN D DEFICIENCY: ICD-10-CM

## 2019-01-10 DIAGNOSIS — N52.8 OTHER MALE ERECTILE DYSFUNCTION: ICD-10-CM

## 2019-01-10 DIAGNOSIS — I10 ESSENTIAL HYPERTENSION: ICD-10-CM

## 2019-01-10 DIAGNOSIS — R73.9 HYPERGLYCEMIA: ICD-10-CM

## 2019-01-10 DIAGNOSIS — R79.89 LOW TESTOSTERONE: ICD-10-CM

## 2019-01-10 DIAGNOSIS — E78.5 DYSLIPIDEMIA: ICD-10-CM

## 2019-01-10 DIAGNOSIS — E79.0 HYPERURICEMIA: ICD-10-CM

## 2019-01-10 RX ORDER — TADALAFIL 5 MG/1
5 TABLET ORAL DAILY PRN
Qty: 90 TABLET | Refills: 0 | Status: SHIPPED | OUTPATIENT
Start: 2019-01-10 | End: 2019-04-11 | Stop reason: SDUPTHER

## 2019-02-13 ENCOUNTER — PRIOR AUTHORIZATION (OUTPATIENT)
Dept: ENDOCRINOLOGY | Age: 79
End: 2019-02-13

## 2019-02-28 ENCOUNTER — RESULTS ENCOUNTER (OUTPATIENT)
Dept: ENDOCRINOLOGY | Age: 79
End: 2019-02-28

## 2019-02-28 DIAGNOSIS — E11.9 CONTROLLED TYPE 2 DIABETES MELLITUS WITHOUT COMPLICATION, WITHOUT LONG-TERM CURRENT USE OF INSULIN (HCC): ICD-10-CM

## 2019-02-28 DIAGNOSIS — I10 ESSENTIAL HYPERTENSION: ICD-10-CM

## 2019-02-28 DIAGNOSIS — N52.8 OTHER MALE ERECTILE DYSFUNCTION: ICD-10-CM

## 2019-02-28 DIAGNOSIS — N52.9 ERECTILE DYSFUNCTION, UNSPECIFIED ERECTILE DYSFUNCTION TYPE: ICD-10-CM

## 2019-02-28 DIAGNOSIS — E03.9 PRIMARY HYPOTHYROIDISM: ICD-10-CM

## 2019-02-28 DIAGNOSIS — R73.9 HYPERGLYCEMIA: ICD-10-CM

## 2019-02-28 DIAGNOSIS — E55.9 VITAMIN D DEFICIENCY: ICD-10-CM

## 2019-02-28 DIAGNOSIS — I10 BENIGN ESSENTIAL HYPERTENSION: ICD-10-CM

## 2019-02-28 DIAGNOSIS — R79.89 LOW TESTOSTERONE: ICD-10-CM

## 2019-02-28 DIAGNOSIS — E78.5 DYSLIPIDEMIA: ICD-10-CM

## 2019-02-28 DIAGNOSIS — N40.0 BENIGN NON-NODULAR PROSTATIC HYPERPLASIA WITHOUT LOWER URINARY TRACT SYMPTOMS: ICD-10-CM

## 2019-02-28 DIAGNOSIS — E79.0 HYPERURICEMIA: ICD-10-CM

## 2019-03-29 LAB
25(OH)D3+25(OH)D2 SERPL-MCNC: 25.6 NG/ML (ref 30–100)
ALBUMIN SERPL-MCNC: 4.6 G/DL (ref 3.5–5.2)
ALBUMIN/GLOB SERPL: 2.2 G/DL
ALP SERPL-CCNC: 55 U/L (ref 39–117)
ALT SERPL-CCNC: 21 U/L (ref 1–41)
AST SERPL-CCNC: 20 U/L (ref 1–40)
BASOPHILS # BLD AUTO: 0.05 10*3/MM3 (ref 0–0.2)
BASOPHILS NFR BLD AUTO: 0.8 % (ref 0–1.5)
BILIRUB SERPL-MCNC: 0.5 MG/DL (ref 0.2–1.2)
BUN SERPL-MCNC: 43 MG/DL (ref 8–23)
BUN/CREAT SERPL: 25.3 (ref 7–25)
C PEPTIDE SERPL-MCNC: 4.1 NG/ML (ref 1.1–4.4)
CALCIUM SERPL-MCNC: 9.3 MG/DL (ref 8.6–10.5)
CHLORIDE SERPL-SCNC: 106 MMOL/L (ref 98–107)
CHOLEST SERPL-MCNC: 187 MG/DL (ref 0–200)
CO2 SERPL-SCNC: 22.5 MMOL/L (ref 22–29)
CREAT SERPL-MCNC: 1.7 MG/DL (ref 0.76–1.27)
EOSINOPHIL # BLD AUTO: 0.18 10*3/MM3 (ref 0–0.4)
EOSINOPHIL NFR BLD AUTO: 3 % (ref 0.3–6.2)
ERYTHROCYTE [DISTWIDTH] IN BLOOD BY AUTOMATED COUNT: 14 % (ref 12.3–15.4)
GLOBULIN SER CALC-MCNC: 2.1 GM/DL
GLUCOSE SERPL-MCNC: 159 MG/DL (ref 65–99)
HBA1C MFR BLD: 7.35 % (ref 4.8–5.6)
HCT VFR BLD AUTO: 46.2 % (ref 37.5–51)
HDLC SERPL-MCNC: 34 MG/DL (ref 40–60)
HGB BLD-MCNC: 14.9 G/DL (ref 13–17.7)
IMM GRANULOCYTES # BLD AUTO: 0.02 10*3/MM3 (ref 0–0.05)
IMM GRANULOCYTES NFR BLD AUTO: 0.3 % (ref 0–0.5)
INTERPRETATION: NORMAL
LDLC SERPL CALC-MCNC: 95 MG/DL (ref 0–100)
LYMPHOCYTES # BLD AUTO: 1.5 10*3/MM3 (ref 0.7–3.1)
LYMPHOCYTES NFR BLD AUTO: 24.9 % (ref 19.6–45.3)
Lab: NORMAL
MCH RBC QN AUTO: 31 PG (ref 26.6–33)
MCHC RBC AUTO-ENTMCNC: 32.3 G/DL (ref 31.5–35.7)
MCV RBC AUTO: 96 FL (ref 79–97)
MONOCYTES # BLD AUTO: 0.46 10*3/MM3 (ref 0.1–0.9)
MONOCYTES NFR BLD AUTO: 7.6 % (ref 5–12)
NEUTROPHILS # BLD AUTO: 3.81 10*3/MM3 (ref 1.4–7)
NEUTROPHILS NFR BLD AUTO: 63.4 % (ref 42.7–76)
NRBC BLD AUTO-RTO: 0 /100 WBC (ref 0–0)
PLATELET # BLD AUTO: 139 10*3/MM3 (ref 140–450)
POTASSIUM SERPL-SCNC: 4.8 MMOL/L (ref 3.5–5.2)
PROT SERPL-MCNC: 6.7 G/DL (ref 6–8.5)
PSA SERPL-MCNC: 2.01 NG/ML (ref 0–4)
RBC # BLD AUTO: 4.81 10*6/MM3 (ref 4.14–5.8)
SODIUM SERPL-SCNC: 142 MMOL/L (ref 136–145)
T3FREE SERPL-MCNC: 2.9 PG/ML (ref 2–4.4)
T4 FREE SERPL-MCNC: 1 NG/DL (ref 0.93–1.7)
T4 SERPL-MCNC: 5.47 MCG/DL (ref 4.5–11.7)
THYROGLOB AB SERPL-ACNC: <1 IU/ML (ref 0–0.9)
THYROGLOB SERPL-MCNC: 8.6 NG/ML (ref 1.4–29.2)
TRIGL SERPL-MCNC: 288 MG/DL (ref 0–150)
TSH SERPL DL<=0.005 MIU/L-ACNC: 2.91 MIU/ML (ref 0.27–4.2)
URATE SERPL-MCNC: 9.4 MG/DL (ref 3.4–7)
VLDLC SERPL CALC-MCNC: 57.6 MG/DL (ref 5–40)
WBC # BLD AUTO: 6.02 10*3/MM3 (ref 3.4–10.8)

## 2019-04-04 DIAGNOSIS — I10 ESSENTIAL HYPERTENSION: ICD-10-CM

## 2019-04-04 DIAGNOSIS — R79.89 LOW TESTOSTERONE: ICD-10-CM

## 2019-04-04 DIAGNOSIS — E78.5 DYSLIPIDEMIA: ICD-10-CM

## 2019-04-04 DIAGNOSIS — E79.0 HYPERURICEMIA: ICD-10-CM

## 2019-04-04 DIAGNOSIS — N52.8 OTHER MALE ERECTILE DYSFUNCTION: ICD-10-CM

## 2019-04-04 DIAGNOSIS — R73.9 HYPERGLYCEMIA: ICD-10-CM

## 2019-04-04 DIAGNOSIS — E55.9 VITAMIN D DEFICIENCY: ICD-10-CM

## 2019-04-04 RX ORDER — OLMESARTAN MEDOXOMIL, AMLODIPINE AND HYDROCHLOROTHIAZIDE TABLET 40/10/25 MG 40; 10; 25 MG/1; MG/1; MG/1
1 TABLET ORAL DAILY
Qty: 30 TABLET | Refills: 0 | Status: SHIPPED | OUTPATIENT
Start: 2019-04-04 | End: 2019-04-11 | Stop reason: SDUPTHER

## 2019-04-11 ENCOUNTER — OFFICE VISIT (OUTPATIENT)
Dept: ENDOCRINOLOGY | Age: 79
End: 2019-04-11

## 2019-04-11 VITALS
BODY MASS INDEX: 29.63 KG/M2 | HEIGHT: 67 IN | WEIGHT: 188.8 LBS | DIASTOLIC BLOOD PRESSURE: 72 MMHG | SYSTOLIC BLOOD PRESSURE: 124 MMHG | RESPIRATION RATE: 16 BRPM

## 2019-04-11 DIAGNOSIS — E29.1 HYPOGONADISM IN MALE: ICD-10-CM

## 2019-04-11 DIAGNOSIS — E79.0 HYPERURICEMIA: ICD-10-CM

## 2019-04-11 DIAGNOSIS — R79.89 LOW TESTOSTERONE: ICD-10-CM

## 2019-04-11 DIAGNOSIS — I10 BENIGN ESSENTIAL HYPERTENSION: ICD-10-CM

## 2019-04-11 DIAGNOSIS — E11.9 CONTROLLED TYPE 2 DIABETES MELLITUS WITHOUT COMPLICATION, WITHOUT LONG-TERM CURRENT USE OF INSULIN (HCC): Primary | ICD-10-CM

## 2019-04-11 DIAGNOSIS — N40.0 BENIGN NON-NODULAR PROSTATIC HYPERPLASIA WITHOUT LOWER URINARY TRACT SYMPTOMS: ICD-10-CM

## 2019-04-11 DIAGNOSIS — E78.5 DYSLIPIDEMIA: ICD-10-CM

## 2019-04-11 DIAGNOSIS — I10 ESSENTIAL HYPERTENSION: ICD-10-CM

## 2019-04-11 DIAGNOSIS — N52.8 OTHER MALE ERECTILE DYSFUNCTION: ICD-10-CM

## 2019-04-11 DIAGNOSIS — E55.9 VITAMIN D DEFICIENCY: ICD-10-CM

## 2019-04-11 DIAGNOSIS — N52.9 ERECTILE DYSFUNCTION, UNSPECIFIED ERECTILE DYSFUNCTION TYPE: ICD-10-CM

## 2019-04-11 DIAGNOSIS — R73.9 HYPERGLYCEMIA: ICD-10-CM

## 2019-04-11 PROCEDURE — 99214 OFFICE O/P EST MOD 30 MIN: CPT | Performed by: INTERNAL MEDICINE

## 2019-04-11 RX ORDER — OLMESARTAN MEDOXOMIL, AMLODIPINE AND HYDROCHLOROTHIAZIDE TABLET 40/10/25 MG 40; 10; 25 MG/1; MG/1; MG/1
1 TABLET ORAL DAILY
Qty: 90 TABLET | Refills: 3 | Status: SHIPPED | OUTPATIENT
Start: 2019-04-11 | End: 2019-11-26

## 2019-04-11 RX ORDER — TESTOSTERONE GEL, 1% 10 MG/G
100 GEL TRANSDERMAL DAILY
Qty: 60 TUBE | Refills: 5 | Status: SHIPPED | OUTPATIENT
Start: 2019-04-11 | End: 2019-10-07 | Stop reason: SDUPTHER

## 2019-04-11 RX ORDER — ERGOCALCIFEROL 1.25 MG/1
50000 CAPSULE ORAL 2 TIMES WEEKLY
Qty: 26 CAPSULE | Refills: 3 | Status: SHIPPED | OUTPATIENT
Start: 2019-04-11 | End: 2019-11-26 | Stop reason: SDUPTHER

## 2019-04-11 RX ORDER — TADALAFIL 5 MG/1
5 TABLET ORAL DAILY PRN
Qty: 90 TABLET | Refills: 3 | Status: SHIPPED | OUTPATIENT
Start: 2019-04-11 | End: 2019-11-26 | Stop reason: SDUPTHER

## 2019-04-11 RX ORDER — PIOGLITAZONEHYDROCHLORIDE 15 MG/1
15 TABLET ORAL DAILY
Qty: 90 TABLET | Refills: 3 | Status: SHIPPED | OUTPATIENT
Start: 2019-04-11 | End: 2019-11-26 | Stop reason: SDUPTHER

## 2019-04-11 RX ORDER — NEBIVOLOL HYDROCHLORIDE 20 MG/1
20 TABLET ORAL DAILY
Qty: 90 TABLET | Refills: 3 | Status: SHIPPED | OUTPATIENT
Start: 2019-04-11 | End: 2019-11-26 | Stop reason: SDUPTHER

## 2019-04-11 RX ORDER — FEBUXOSTAT 40 MG/1
40 TABLET ORAL DAILY
Qty: 90 TABLET | Refills: 3 | Status: SHIPPED | OUTPATIENT
Start: 2019-04-11 | End: 2019-11-26

## 2019-04-11 RX ORDER — LINAGLIPTIN 5 MG/1
5 TABLET, FILM COATED ORAL DAILY
Qty: 30 TABLET | Refills: 5 | Status: SHIPPED | OUTPATIENT
Start: 2019-04-11 | End: 2019-11-26 | Stop reason: SDUPTHER

## 2019-04-11 RX ORDER — EZETIMIBE 10 MG/1
10 TABLET ORAL DAILY
Qty: 90 TABLET | Refills: 3 | Status: SHIPPED | OUTPATIENT
Start: 2019-04-11 | End: 2019-11-26

## 2019-09-30 ENCOUNTER — RESULTS ENCOUNTER (OUTPATIENT)
Dept: ENDOCRINOLOGY | Age: 79
End: 2019-09-30

## 2019-09-30 DIAGNOSIS — I10 ESSENTIAL HYPERTENSION: ICD-10-CM

## 2019-09-30 DIAGNOSIS — R79.89 LOW TESTOSTERONE: ICD-10-CM

## 2019-09-30 DIAGNOSIS — E78.5 DYSLIPIDEMIA: ICD-10-CM

## 2019-09-30 DIAGNOSIS — E55.9 VITAMIN D DEFICIENCY: ICD-10-CM

## 2019-09-30 DIAGNOSIS — N40.0 BENIGN NON-NODULAR PROSTATIC HYPERPLASIA WITHOUT LOWER URINARY TRACT SYMPTOMS: ICD-10-CM

## 2019-09-30 DIAGNOSIS — E11.9 CONTROLLED TYPE 2 DIABETES MELLITUS WITHOUT COMPLICATION, WITHOUT LONG-TERM CURRENT USE OF INSULIN (HCC): ICD-10-CM

## 2019-09-30 DIAGNOSIS — R73.9 HYPERGLYCEMIA: ICD-10-CM

## 2019-09-30 DIAGNOSIS — I10 BENIGN ESSENTIAL HYPERTENSION: ICD-10-CM

## 2019-09-30 DIAGNOSIS — N52.8 OTHER MALE ERECTILE DYSFUNCTION: ICD-10-CM

## 2019-09-30 DIAGNOSIS — N52.9 ERECTILE DYSFUNCTION, UNSPECIFIED ERECTILE DYSFUNCTION TYPE: ICD-10-CM

## 2019-09-30 DIAGNOSIS — E79.0 HYPERURICEMIA: ICD-10-CM

## 2019-09-30 DIAGNOSIS — E29.1 HYPOGONADISM IN MALE: ICD-10-CM

## 2019-10-07 RX ORDER — TESTOSTERONE GEL, 1% 10 MG/G
100 GEL TRANSDERMAL DAILY
Qty: 60 TUBE | Refills: 0 | Status: SHIPPED | OUTPATIENT
Start: 2019-10-07 | End: 2019-11-05 | Stop reason: SDUPTHER

## 2019-10-07 RX ORDER — TESTOSTERONE GEL, 1% 10 MG/G
GEL TRANSDERMAL
Qty: 300 G | Refills: 0 | Status: CANCELLED | OUTPATIENT
Start: 2019-10-07

## 2019-10-17 DIAGNOSIS — E55.9 VITAMIN D DEFICIENCY: ICD-10-CM

## 2019-10-17 DIAGNOSIS — I10 ESSENTIAL HYPERTENSION: ICD-10-CM

## 2019-10-17 DIAGNOSIS — E79.0 HYPERURICEMIA: ICD-10-CM

## 2019-10-17 DIAGNOSIS — R79.89 LOW TESTOSTERONE: ICD-10-CM

## 2019-10-17 DIAGNOSIS — N52.8 OTHER MALE ERECTILE DYSFUNCTION: ICD-10-CM

## 2019-10-17 DIAGNOSIS — E78.5 DYSLIPIDEMIA: ICD-10-CM

## 2019-10-17 DIAGNOSIS — R73.9 HYPERGLYCEMIA: ICD-10-CM

## 2019-10-17 RX ORDER — EZETIMIBE 10 MG/1
10 TABLET ORAL DAILY
Qty: 90 TABLET | Refills: 0 | Status: SHIPPED | OUTPATIENT
Start: 2019-10-17 | End: 2019-11-26

## 2019-10-17 RX ORDER — FINASTERIDE 5 MG/1
5 TABLET, FILM COATED ORAL DAILY
Qty: 90 TABLET | Refills: 3 | Status: SHIPPED | OUTPATIENT
Start: 2019-10-17 | End: 2019-11-26 | Stop reason: SDUPTHER

## 2019-10-18 DIAGNOSIS — R79.89 LOW TESTOSTERONE: ICD-10-CM

## 2019-10-18 DIAGNOSIS — N52.8 OTHER MALE ERECTILE DYSFUNCTION: ICD-10-CM

## 2019-10-18 DIAGNOSIS — R73.9 HYPERGLYCEMIA: ICD-10-CM

## 2019-10-18 DIAGNOSIS — E55.9 VITAMIN D DEFICIENCY: ICD-10-CM

## 2019-10-18 DIAGNOSIS — E78.5 DYSLIPIDEMIA: ICD-10-CM

## 2019-10-18 DIAGNOSIS — E79.0 HYPERURICEMIA: ICD-10-CM

## 2019-10-18 DIAGNOSIS — I10 ESSENTIAL HYPERTENSION: ICD-10-CM

## 2019-10-18 RX ORDER — OLMESARTAN MEDOXOMIL, AMLODIPINE AND HYDROCHLOROTHIAZIDE TABLET 40/10/25 MG 40; 10; 25 MG/1; MG/1; MG/1
1 TABLET ORAL DAILY
Qty: 30 TABLET | Refills: 0 | Status: SHIPPED | OUTPATIENT
Start: 2019-10-18 | End: 2019-11-26

## 2019-11-06 RX ORDER — TESTOSTERONE GEL, 1% 10 MG/G
GEL TRANSDERMAL
Qty: 300 G | Refills: 0 | Status: SHIPPED | OUTPATIENT
Start: 2019-11-06 | End: 2019-11-26 | Stop reason: SDUPTHER

## 2019-11-13 LAB
25(OH)D3+25(OH)D2 SERPL-MCNC: 58.3 NG/ML (ref 30–100)
ALBUMIN SERPL-MCNC: 4.7 G/DL (ref 3.5–5.2)
ALBUMIN/GLOB SERPL: 2 G/DL
ALP SERPL-CCNC: 56 U/L (ref 39–117)
ALT SERPL-CCNC: 24 U/L (ref 1–41)
AST SERPL-CCNC: 21 U/L (ref 1–40)
BILIRUB SERPL-MCNC: 0.9 MG/DL (ref 0.2–1.2)
BUN SERPL-MCNC: 35 MG/DL (ref 8–23)
BUN/CREAT SERPL: 23.5 (ref 7–25)
C PEPTIDE SERPL-MCNC: 3 NG/ML (ref 1.1–4.4)
CALCIUM SERPL-MCNC: 9.6 MG/DL (ref 8.6–10.5)
CHLORIDE SERPL-SCNC: 104 MMOL/L (ref 98–107)
CHOLEST SERPL-MCNC: 159 MG/DL (ref 0–200)
CO2 SERPL-SCNC: 24 MMOL/L (ref 22–29)
CREAT SERPL-MCNC: 1.49 MG/DL (ref 0.76–1.27)
GLOBULIN SER CALC-MCNC: 2.3 GM/DL
GLUCOSE SERPL-MCNC: 137 MG/DL (ref 65–99)
HBA1C MFR BLD: 7 % (ref 4.8–5.6)
HCT VFR BLD AUTO: 49.6 % (ref 37.5–51)
HDLC SERPL-MCNC: 39 MG/DL (ref 40–60)
HGB BLD-MCNC: 16.4 G/DL (ref 13–17.7)
INTERPRETATION: NORMAL
LDLC SERPL CALC-MCNC: 92 MG/DL (ref 0–100)
Lab: NORMAL
MICROALBUMIN UR-MCNC: 3.2 UG/ML
POTASSIUM SERPL-SCNC: 4.7 MMOL/L (ref 3.5–5.2)
PROT SERPL-MCNC: 7 G/DL (ref 6–8.5)
SHBG SERPL-SCNC: 27.3 NMOL/L (ref 19.3–76.4)
SODIUM SERPL-SCNC: 140 MMOL/L (ref 136–145)
T3FREE SERPL-MCNC: 2.5 PG/ML (ref 2–4.4)
T4 FREE SERPL-MCNC: 0.92 NG/DL (ref 0.93–1.7)
T4 SERPL-MCNC: 6.05 MCG/DL (ref 4.5–11.7)
TESTOST FREE SERPL-MCNC: 20.1 PG/ML (ref 6.6–18.1)
TESTOST SERPL-MCNC: 744 NG/DL (ref 264–916)
THYROGLOB AB SERPL-ACNC: <1 IU/ML (ref 0–0.9)
THYROGLOB SERPL-MCNC: 8.7 NG/ML (ref 1.4–29.2)
TRIGL SERPL-MCNC: 139 MG/DL (ref 0–150)
TSH SERPL DL<=0.005 MIU/L-ACNC: 3.77 UIU/ML (ref 0.27–4.2)
URATE SERPL-MCNC: 8 MG/DL (ref 3.4–7)
VLDLC SERPL CALC-MCNC: 27.8 MG/DL

## 2019-11-26 ENCOUNTER — OFFICE VISIT (OUTPATIENT)
Dept: ENDOCRINOLOGY | Age: 79
End: 2019-11-26

## 2019-11-26 VITALS
DIASTOLIC BLOOD PRESSURE: 68 MMHG | BODY MASS INDEX: 29.29 KG/M2 | HEIGHT: 67 IN | SYSTOLIC BLOOD PRESSURE: 142 MMHG | WEIGHT: 186.6 LBS

## 2019-11-26 DIAGNOSIS — E79.0 HYPERURICEMIA: ICD-10-CM

## 2019-11-26 DIAGNOSIS — R79.89 LOW TESTOSTERONE: ICD-10-CM

## 2019-11-26 DIAGNOSIS — E78.5 DYSLIPIDEMIA: ICD-10-CM

## 2019-11-26 DIAGNOSIS — I15.2 OBESITY, DIABETES, AND HYPERTENSION SYNDROME (HCC): ICD-10-CM

## 2019-11-26 DIAGNOSIS — E29.1 HYPOGONADISM IN MALE: ICD-10-CM

## 2019-11-26 DIAGNOSIS — I10 ESSENTIAL HYPERTENSION: ICD-10-CM

## 2019-11-26 DIAGNOSIS — I10 BENIGN ESSENTIAL HYPERTENSION: ICD-10-CM

## 2019-11-26 DIAGNOSIS — E55.9 VITAMIN D DEFICIENCY: ICD-10-CM

## 2019-11-26 DIAGNOSIS — N52.9 ERECTILE DYSFUNCTION, UNSPECIFIED ERECTILE DYSFUNCTION TYPE: ICD-10-CM

## 2019-11-26 DIAGNOSIS — N52.8 OTHER MALE ERECTILE DYSFUNCTION: ICD-10-CM

## 2019-11-26 DIAGNOSIS — E11.59 OBESITY, DIABETES, AND HYPERTENSION SYNDROME (HCC): ICD-10-CM

## 2019-11-26 DIAGNOSIS — E66.9 OBESITY, DIABETES, AND HYPERTENSION SYNDROME (HCC): ICD-10-CM

## 2019-11-26 DIAGNOSIS — R73.9 HYPERGLYCEMIA: ICD-10-CM

## 2019-11-26 DIAGNOSIS — E11.69 OBESITY, DIABETES, AND HYPERTENSION SYNDROME (HCC): ICD-10-CM

## 2019-11-26 DIAGNOSIS — N40.0 BENIGN NON-NODULAR PROSTATIC HYPERPLASIA WITHOUT LOWER URINARY TRACT SYMPTOMS: ICD-10-CM

## 2019-11-26 DIAGNOSIS — E11.9 CONTROLLED TYPE 2 DIABETES MELLITUS WITHOUT COMPLICATION, WITHOUT LONG-TERM CURRENT USE OF INSULIN (HCC): Primary | ICD-10-CM

## 2019-11-26 PROCEDURE — 99214 OFFICE O/P EST MOD 30 MIN: CPT | Performed by: INTERNAL MEDICINE

## 2019-11-26 RX ORDER — OLMESARTAN MEDOXOMIL, AMLODIPINE AND HYDROCHLOROTHIAZIDE TABLET 40/10/25 MG 40; 10; 25 MG/1; MG/1; MG/1
1 TABLET ORAL DAILY
Qty: 90 TABLET | Refills: 3 | Status: SHIPPED | OUTPATIENT
Start: 2019-11-26 | End: 2020-08-10 | Stop reason: SDUPTHER

## 2019-11-26 RX ORDER — LEVOTHYROXINE SODIUM 0.05 MG/1
50 TABLET ORAL DAILY
COMMUNITY
End: 2019-11-26 | Stop reason: SDUPTHER

## 2019-11-26 RX ORDER — TESTOSTERONE GEL, 1% 10 MG/G
GEL TRANSDERMAL
Qty: 300 G | Refills: 5 | Status: SHIPPED | OUTPATIENT
Start: 2019-11-26 | End: 2020-06-25 | Stop reason: SDUPTHER

## 2019-11-26 RX ORDER — LEVOTHYROXINE SODIUM 0.05 MG/1
50 TABLET ORAL DAILY
Qty: 90 TABLET | Refills: 3 | Status: SHIPPED | OUTPATIENT
Start: 2019-11-26 | End: 2020-08-10 | Stop reason: SDDI

## 2019-11-26 RX ORDER — ERGOCALCIFEROL 1.25 MG/1
50000 CAPSULE ORAL 2 TIMES WEEKLY
Qty: 26 CAPSULE | Refills: 3 | Status: SHIPPED | OUTPATIENT
Start: 2019-11-28 | End: 2020-06-15

## 2019-11-26 RX ORDER — TADALAFIL 5 MG/1
5 TABLET ORAL DAILY PRN
Qty: 90 TABLET | Refills: 3 | Status: SHIPPED | OUTPATIENT
Start: 2019-11-26 | End: 2020-08-10 | Stop reason: SDUPTHER

## 2019-11-26 RX ORDER — ALLOPURINOL 100 MG/1
100 TABLET ORAL DAILY
Qty: 90 TABLET | Refills: 3 | Status: SHIPPED | OUTPATIENT
Start: 2019-11-26 | End: 2020-08-10 | Stop reason: SDUPTHER

## 2019-11-26 RX ORDER — PIOGLITAZONEHYDROCHLORIDE 15 MG/1
15 TABLET ORAL DAILY
Qty: 90 TABLET | Refills: 3 | Status: SHIPPED | OUTPATIENT
Start: 2019-11-26 | End: 2020-08-10 | Stop reason: SDUPTHER

## 2019-11-26 RX ORDER — NEBIVOLOL HYDROCHLORIDE 20 MG/1
20 TABLET ORAL DAILY
Qty: 90 TABLET | Refills: 3 | Status: SHIPPED | OUTPATIENT
Start: 2019-11-26 | End: 2020-08-10 | Stop reason: ALTCHOICE

## 2019-11-26 RX ORDER — LINAGLIPTIN 5 MG/1
5 TABLET, FILM COATED ORAL DAILY
Qty: 30 TABLET | Refills: 5 | Status: SHIPPED | OUTPATIENT
Start: 2019-11-26 | End: 2020-08-10 | Stop reason: SDUPTHER

## 2019-11-26 RX ORDER — EZETIMIBE 10 MG/1
10 TABLET ORAL DAILY
Qty: 90 TABLET | Refills: 3 | Status: SHIPPED | OUTPATIENT
Start: 2019-11-26 | End: 2020-08-10 | Stop reason: SDUPTHER

## 2019-11-26 RX ORDER — FINASTERIDE 5 MG/1
5 TABLET, FILM COATED ORAL DAILY
Qty: 90 TABLET | Refills: 3 | Status: SHIPPED | OUTPATIENT
Start: 2019-11-26 | End: 2020-08-10 | Stop reason: SDUPTHER

## 2019-11-26 NOTE — PROGRESS NOTES
"Subjective   Randy Mccann is a 79 y.o. male here for follow up for DM2.     History of Present Illness this is a 79-year-old gentleman known patient with type 2 diabetes hypertension and dyslipidemia as well as vitamin D deficiency and erectile dysfunction with low testosterone.  Over the course of last 6 months she has had no significant health problem for which to go to the ER or hospital.  /68   Ht 170.2 cm (67.01\")   Wt 84.6 kg (186 lb 9.6 oz)   BMI 29.22 kg/m²   Allergies   Allergen Reactions   • Statins Other (See Comments)     Cramps,joint pain  Cramps,joint pain  Cramps,joint pain     Current Outpatient Medications on File Prior to Visit   Medication Sig Dispense Refill   • aspirin 81 MG EC tablet Take 81 mg by mouth Daily.     • BYSTOLIC 20 MG tablet Take 1 tablet by mouth Daily. 90 tablet 3   • ergocalciferol (ERGOCALCIFEROL) 15439 units capsule Take 1 capsule by mouth 2 (Two) Times a Week. 26 capsule 3   • ezetimibe (ZETIA) 10 MG tablet Take 1 tablet by mouth Daily. 90 tablet 3   • ezetimibe (ZETIA) 10 MG tablet TAKE 1 TABLET BY MOUTH DAILY 90 tablet 0   • finasteride (PROSCAR) 5 MG tablet Take 1 tablet by mouth Daily. 90 tablet 3   • levothyroxine (SYNTHROID) 50 MCG tablet Take 50 mcg by mouth Daily.     • Multiple Vitamins-Minerals (MULTI VITAMIN/MINERALS) tablet Take 1 tablet by mouth daily.     • Olmesartan-amLODIPine-HCTZ 40-10-25 MG tablet Take 1 tablet by mouth Daily. 90 tablet 3   • Olmesartan-amLODIPine-HCTZ 40-10-25 MG tablet TAKE 1 TABLET BY MOUTH DAILY 30 tablet 0   • pioglitazone (ACTOS) 15 MG tablet Take 1 tablet by mouth Daily. 90 tablet 3   • tadalafil (CIALIS) 5 MG tablet Take 1 tablet by mouth Daily As Needed for erectile dysfunction. 90 tablet 3   • testosterone (ANDROGEL) 50 MG/5GM (1%) gel gel PLACE 100MG(2 TUBES) ON THE SKIN AS DIRECTED BY PROVIDER DAILY 300 g 0   • TRADJENTA 5 MG tablet tablet Take 1 tablet by mouth Daily. 30 tablet 5   • [DISCONTINUED] ULORIC 40 MG " tablet Take 1 tablet by mouth Daily. 90 tablet 3   • [DISCONTINUED] warfarin (COUMADIN) 2.5 MG tablet Take 2.5 mg by mouth Daily.       No current facility-administered medications on file prior to visit.          The following portions of the patient's history were reviewed and updated as appropriate: current medications, past family history, past medical history, past social history, past surgical history and problem list.    Review of Systems   Constitutional: Negative.    HENT: Negative.    Eyes: Negative.    Respiratory: Negative.    Cardiovascular: Negative.    Gastrointestinal: Negative.    Endocrine: Negative.    Genitourinary: Negative.    Musculoskeletal: Negative.    Skin: Negative.    Allergic/Immunologic: Negative.    Neurological: Negative.    Hematological: Negative.    Psychiatric/Behavioral: Negative.    The above review of system was reviewed, corroborated and accepted.  Objective   Physical Exam   Constitutional: He is oriented to person, place, and time. He appears well-developed and well-nourished. No distress.   HENT:   Head: Normocephalic and atraumatic.   Right Ear: External ear normal.   Left Ear: External ear normal.   Nose: Nose normal.   Mouth/Throat: Oropharynx is clear and moist. No oropharyngeal exudate.   Eyes: Conjunctivae and EOM are normal. Pupils are equal, round, and reactive to light. Right eye exhibits no discharge. Left eye exhibits no discharge. No scleral icterus.   Neck: Normal range of motion. Neck supple. No JVD present. No tracheal deviation present. No thyromegaly present.   Cardiovascular: Normal rate, regular rhythm, normal heart sounds and intact distal pulses. Exam reveals no gallop and no friction rub.   No murmur heard.  Healed the scar of heart valve surgery in the anterior midline chest.   Pulmonary/Chest: Effort normal and breath sounds normal. No stridor. No respiratory distress. He has no wheezes. He has no rales. He exhibits no tenderness.   Abdominal: Soft.  Bowel sounds are normal. He exhibits no distension and no mass. There is no tenderness. There is no rebound and no guarding. No hernia.   Musculoskeletal: Normal range of motion. He exhibits no edema, tenderness or deformity.   Lymphadenopathy:     He has no cervical adenopathy.   Neurological: He is alert and oriented to person, place, and time. He has normal reflexes. He displays normal reflexes. No cranial nerve deficit or sensory deficit. He exhibits normal muscle tone. Coordination normal.   Skin: Skin is warm. No rash noted. He is not diaphoretic. No erythema. No pallor.   Psychiatric: He has a normal mood and affect. His behavior is normal. Judgment and thought content normal.   Nursing note and vitals reviewed.  No significant change since 4/11/2019 office visit.          Assessment/Plan   Randy was seen today for follow-up.    Diagnoses and all orders for this visit:    Controlled type 2 diabetes mellitus without complication, without long-term current use of insulin (CMS/[facility])  -     TestT+TestF+SHBG; Future  -     T4 & TSH ([facility]); Future  -     Uric Acid; Future  -     Vitamin D 25 Hydroxy; Future  -     Comprehensive Metabolic Panel; Future  -     C-Peptide; Future  -     Hemoglobin A1c; Future  -     MicroAlbumin, Urine, Random - Urine, Clean Catch; Future  -     Lipid Panel; Future  -     PSA DIAGNOSTIC; Future  -     Hemoglobin & Hematocrit, Blood; Future    Benign essential hypertension  -     TestT+TestF+SHBG; Future  -     T4 & TSH ([facility]); Future  -     Uric Acid; Future  -     Vitamin D 25 Hydroxy; Future  -     Comprehensive Metabolic Panel; Future  -     C-Peptide; Future  -     Hemoglobin A1c; Future  -     MicroAlbumin, Urine, Random - Urine, Clean Catch; Future  -     Lipid Panel; Future  -     PSA DIAGNOSTIC; Future  -     Hemoglobin & Hematocrit, Blood; Future    Obesity, diabetes, and hypertension syndrome (CMS/[facility])  -     TestT+TestF+SHBG; Future  -     T4 & TSH ([facility]); Future  -      Uric Acid; Future  -     Vitamin D 25 Hydroxy; Future  -     Comprehensive Metabolic Panel; Future  -     C-Peptide; Future  -     Hemoglobin A1c; Future  -     MicroAlbumin, Urine, Random - Urine, Clean Catch; Future  -     Lipid Panel; Future  -     PSA DIAGNOSTIC; Future  -     Hemoglobin & Hematocrit, Blood; Future    Vitamin D deficiency  -     TestT+TestF+SHBG; Future  -     T4 & TSH ([facility]); Future  -     Uric Acid; Future  -     Vitamin D 25 Hydroxy; Future  -     Comprehensive Metabolic Panel; Future  -     C-Peptide; Future  -     Hemoglobin A1c; Future  -     MicroAlbumin, Urine, Random - Urine, Clean Catch; Future  -     Lipid Panel; Future  -     PSA DIAGNOSTIC; Future  -     Hemoglobin & Hematocrit, Blood; Future  -     tadalafil (CIALIS) 5 MG tablet; Take 1 tablet by mouth Daily As Needed for Erectile Dysfunction.  -     Olmesartan-amLODIPine-HCTZ 40-10-25 MG tablet; Take 1 tablet by mouth Daily.  -     ezetimibe (ZETIA) 10 MG tablet; Take 1 tablet by mouth Daily.  -     ergocalciferol (ERGOCALCIFEROL) 1.25 MG (05154 UT) capsule; Take 1 capsule by mouth 2 (Two) Times a Week.    Benign non-nodular prostatic hyperplasia without lower urinary tract symptoms  -     TestT+TestF+SHBG; Future  -     T4 & TSH ([facility]); Future  -     Uric Acid; Future  -     Vitamin D 25 Hydroxy; Future  -     Comprehensive Metabolic Panel; Future  -     C-Peptide; Future  -     Hemoglobin A1c; Future  -     MicroAlbumin, Urine, Random - Urine, Clean Catch; Future  -     Lipid Panel; Future  -     PSA DIAGNOSTIC; Future  -     Hemoglobin & Hematocrit, Blood; Future    Hypogonadism in male  -     TestT+TestF+SHBG; Future  -     T4 & TSH ([facility]); Future  -     Uric Acid; Future  -     Vitamin D 25 Hydroxy; Future  -     Comprehensive Metabolic Panel; Future  -     C-Peptide; Future  -     Hemoglobin A1c; Future  -     MicroAlbumin, Urine, Random - Urine, Clean Catch; Future  -     Lipid Panel; Future  -     PSA  DIAGNOSTIC; Future  -     Hemoglobin & Hematocrit, Blood; Future    Low testosterone  -     TestT+TestF+SHBG; Future  -     T4 & TSH ([facility]); Future  -     Uric Acid; Future  -     Vitamin D 25 Hydroxy; Future  -     Comprehensive Metabolic Panel; Future  -     C-Peptide; Future  -     Hemoglobin A1c; Future  -     MicroAlbumin, Urine, Random - Urine, Clean Catch; Future  -     Lipid Panel; Future  -     PSA DIAGNOSTIC; Future  -     Hemoglobin & Hematocrit, Blood; Future  -     tadalafil (CIALIS) 5 MG tablet; Take 1 tablet by mouth Daily As Needed for Erectile Dysfunction.  -     Olmesartan-amLODIPine-HCTZ 40-10-25 MG tablet; Take 1 tablet by mouth Daily.  -     ezetimibe (ZETIA) 10 MG tablet; Take 1 tablet by mouth Daily.  -     ergocalciferol (ERGOCALCIFEROL) 1.25 MG (11515 UT) capsule; Take 1 capsule by mouth 2 (Two) Times a Week.    Erectile dysfunction, unspecified erectile dysfunction type  -     TestT+TestF+SHBG; Future  -     T4 & TSH ([facility]); Future  -     Uric Acid; Future  -     Vitamin D 25 Hydroxy; Future  -     Comprehensive Metabolic Panel; Future  -     C-Peptide; Future  -     Hemoglobin A1c; Future  -     MicroAlbumin, Urine, Random - Urine, Clean Catch; Future  -     Lipid Panel; Future  -     PSA DIAGNOSTIC; Future  -     Hemoglobin & Hematocrit, Blood; Future    Hyperuricemia  -     TestT+TestF+SHBG; Future  -     T4 & TSH ([facility]); Future  -     Uric Acid; Future  -     Vitamin D 25 Hydroxy; Future  -     Comprehensive Metabolic Panel; Future  -     C-Peptide; Future  -     Hemoglobin A1c; Future  -     MicroAlbumin, Urine, Random - Urine, Clean Catch; Future  -     Lipid Panel; Future  -     PSA DIAGNOSTIC; Future  -     Hemoglobin & Hematocrit, Blood; Future  -     tadalafil (CIALIS) 5 MG tablet; Take 1 tablet by mouth Daily As Needed for Erectile Dysfunction.  -     Olmesartan-amLODIPine-HCTZ 40-10-25 MG tablet; Take 1 tablet by mouth Daily.  -     ezetimibe (ZETIA) 10 MG tablet; Take  1 tablet by mouth Daily.  -     ergocalciferol (ERGOCALCIFEROL) 1.25 MG (33216 UT) capsule; Take 1 capsule by mouth 2 (Two) Times a Week.    Dyslipidemia  -     TestT+TestF+SHBG; Future  -     T4 & TSH ([facility]); Future  -     Uric Acid; Future  -     Vitamin D 25 Hydroxy; Future  -     Comprehensive Metabolic Panel; Future  -     C-Peptide; Future  -     Hemoglobin A1c; Future  -     MicroAlbumin, Urine, Random - Urine, Clean Catch; Future  -     Lipid Panel; Future  -     PSA DIAGNOSTIC; Future  -     Hemoglobin & Hematocrit, Blood; Future  -     tadalafil (CIALIS) 5 MG tablet; Take 1 tablet by mouth Daily As Needed for Erectile Dysfunction.  -     Olmesartan-amLODIPine-HCTZ 40-10-25 MG tablet; Take 1 tablet by mouth Daily.  -     ezetimibe (ZETIA) 10 MG tablet; Take 1 tablet by mouth Daily.  -     ergocalciferol (ERGOCALCIFEROL) 1.25 MG (53168 UT) capsule; Take 1 capsule by mouth 2 (Two) Times a Week.    Essential hypertension  -     tadalafil (CIALIS) 5 MG tablet; Take 1 tablet by mouth Daily As Needed for Erectile Dysfunction.  -     Olmesartan-amLODIPine-HCTZ 40-10-25 MG tablet; Take 1 tablet by mouth Daily.  -     ezetimibe (ZETIA) 10 MG tablet; Take 1 tablet by mouth Daily.  -     ergocalciferol (ERGOCALCIFEROL) 1.25 MG (41154 UT) capsule; Take 1 capsule by mouth 2 (Two) Times a Week.    Other male erectile dysfunction  -     tadalafil (CIALIS) 5 MG tablet; Take 1 tablet by mouth Daily As Needed for Erectile Dysfunction.  -     Olmesartan-amLODIPine-HCTZ 40-10-25 MG tablet; Take 1 tablet by mouth Daily.  -     ezetimibe (ZETIA) 10 MG tablet; Take 1 tablet by mouth Daily.  -     ergocalciferol (ERGOCALCIFEROL) 1.25 MG (80285 UT) capsule; Take 1 capsule by mouth 2 (Two) Times a Week.    Hyperglycemia  -     tadalafil (CIALIS) 5 MG tablet; Take 1 tablet by mouth Daily As Needed for Erectile Dysfunction.  -     Olmesartan-amLODIPine-HCTZ 40-10-25 MG tablet; Take 1 tablet by mouth Daily.  -     ezetimibe (ZETIA)  10 MG tablet; Take 1 tablet by mouth Daily.  -     ergocalciferol (ERGOCALCIFEROL) 1.25 MG (47083 UT) capsule; Take 1 capsule by mouth 2 (Two) Times a Week.    Other orders  -     TRADJENTA 5 MG tablet tablet; Take 1 tablet by mouth Daily.  -     testosterone (ANDROGEL) 50 MG/5GM (1%) gel gel; 1 tube on each shoulder daily  -     pioglitazone (ACTOS) 15 MG tablet; Take 1 tablet by mouth Daily.  -     levothyroxine (SYNTHROID) 50 MCG tablet; Take 1 tablet by mouth Daily.  -     finasteride (PROSCAR) 5 MG tablet; Take 1 tablet by mouth Daily.  -     BYSTOLIC 20 MG tablet; Take 1 tablet by mouth Daily.  -     allopurinol (ZYLOPRIM) 100 MG tablet; Take 1 tablet by mouth Daily.      In summary I saw and examined this 79-year-old gentleman for above-mentioned problems.  I reviewed his laboratory evaluations of 11/12/2019 and provided him with a hard copy of it.  Overall he is clinically and metabolically stable and therefore we will go ahead and continue his current prescriptions.  Also because of his you elevated uric acid I am going to go ahead and start him on allopurinol 100 mg daily.  I will see him in 6 months or sooner if needed with laboratory evaluation prior to office visit.

## 2020-05-12 ENCOUNTER — RESULTS ENCOUNTER (OUTPATIENT)
Dept: ENDOCRINOLOGY | Age: 80
End: 2020-05-12

## 2020-05-12 DIAGNOSIS — E11.9 CONTROLLED TYPE 2 DIABETES MELLITUS WITHOUT COMPLICATION, WITHOUT LONG-TERM CURRENT USE OF INSULIN (HCC): ICD-10-CM

## 2020-05-12 DIAGNOSIS — I10 BENIGN ESSENTIAL HYPERTENSION: ICD-10-CM

## 2020-05-12 DIAGNOSIS — E55.9 VITAMIN D DEFICIENCY: ICD-10-CM

## 2020-05-12 DIAGNOSIS — E11.59 OBESITY, DIABETES, AND HYPERTENSION SYNDROME (HCC): ICD-10-CM

## 2020-05-12 DIAGNOSIS — I15.2 OBESITY, DIABETES, AND HYPERTENSION SYNDROME (HCC): ICD-10-CM

## 2020-05-12 DIAGNOSIS — R79.89 LOW TESTOSTERONE: ICD-10-CM

## 2020-05-12 DIAGNOSIS — N40.0 BENIGN NON-NODULAR PROSTATIC HYPERPLASIA WITHOUT LOWER URINARY TRACT SYMPTOMS: ICD-10-CM

## 2020-05-12 DIAGNOSIS — E29.1 HYPOGONADISM IN MALE: ICD-10-CM

## 2020-05-12 DIAGNOSIS — E11.69 OBESITY, DIABETES, AND HYPERTENSION SYNDROME (HCC): ICD-10-CM

## 2020-05-12 DIAGNOSIS — E66.9 OBESITY, DIABETES, AND HYPERTENSION SYNDROME (HCC): ICD-10-CM

## 2020-05-12 DIAGNOSIS — E78.5 DYSLIPIDEMIA: ICD-10-CM

## 2020-05-12 DIAGNOSIS — N52.9 ERECTILE DYSFUNCTION, UNSPECIFIED ERECTILE DYSFUNCTION TYPE: ICD-10-CM

## 2020-05-12 DIAGNOSIS — E79.0 HYPERURICEMIA: ICD-10-CM

## 2020-05-18 RX ORDER — TESTOSTERONE GEL, 1% 10 MG/G
GEL TRANSDERMAL
Qty: 300 G | OUTPATIENT
Start: 2020-05-18

## 2020-06-13 DIAGNOSIS — I10 ESSENTIAL HYPERTENSION: ICD-10-CM

## 2020-06-13 DIAGNOSIS — R73.9 HYPERGLYCEMIA: ICD-10-CM

## 2020-06-13 DIAGNOSIS — E55.9 VITAMIN D DEFICIENCY: ICD-10-CM

## 2020-06-13 DIAGNOSIS — N52.8 OTHER MALE ERECTILE DYSFUNCTION: ICD-10-CM

## 2020-06-13 DIAGNOSIS — E78.5 DYSLIPIDEMIA: ICD-10-CM

## 2020-06-13 DIAGNOSIS — R79.89 LOW TESTOSTERONE: ICD-10-CM

## 2020-06-13 DIAGNOSIS — E79.0 HYPERURICEMIA: ICD-10-CM

## 2020-06-15 RX ORDER — ERGOCALCIFEROL 1.25 MG/1
CAPSULE ORAL
Qty: 26 CAPSULE | Refills: 3 | Status: SHIPPED | OUTPATIENT
Start: 2020-06-15 | End: 2020-08-10 | Stop reason: SDUPTHER

## 2020-06-25 RX ORDER — TESTOSTERONE GEL, 1% 10 MG/G
GEL TRANSDERMAL
Qty: 300 G | Refills: 0 | Status: SHIPPED | OUTPATIENT
Start: 2020-06-25 | End: 2020-07-29 | Stop reason: SDUPTHER

## 2020-07-29 DIAGNOSIS — R79.89 LOW TESTOSTERONE IN MALE: Primary | ICD-10-CM

## 2020-07-29 LAB
25(OH)D3+25(OH)D2 SERPL-MCNC: 56.5 NG/ML (ref 30–100)
ALBUMIN SERPL-MCNC: 5 G/DL (ref 3.5–5.2)
ALBUMIN/GLOB SERPL: 2.4 G/DL
ALP SERPL-CCNC: 64 U/L (ref 39–117)
ALT SERPL-CCNC: 27 U/L (ref 1–41)
AST SERPL-CCNC: 38 U/L (ref 1–40)
BILIRUB SERPL-MCNC: 1.1 MG/DL (ref 0–1.2)
BUN SERPL-MCNC: 21 MG/DL (ref 8–23)
BUN/CREAT SERPL: 16 (ref 7–25)
C PEPTIDE SERPL-MCNC: 2.5 NG/ML (ref 1.1–4.4)
CALCIUM SERPL-MCNC: 9.6 MG/DL (ref 8.6–10.5)
CHLORIDE SERPL-SCNC: 98 MMOL/L (ref 98–107)
CHOLEST SERPL-MCNC: 207 MG/DL (ref 0–200)
CO2 SERPL-SCNC: 27.9 MMOL/L (ref 22–29)
CREAT SERPL-MCNC: 1.31 MG/DL (ref 0.76–1.27)
GLOBULIN SER CALC-MCNC: 2.1 GM/DL
GLUCOSE SERPL-MCNC: 132 MG/DL (ref 65–99)
HBA1C MFR BLD: 6.7 % (ref 4.8–5.6)
HCT VFR BLD AUTO: 51.8 % (ref 37.5–51)
HDLC SERPL-MCNC: 47 MG/DL (ref 40–60)
HGB BLD-MCNC: 17 G/DL (ref 13–17.7)
INTERPRETATION: NORMAL
LDLC SERPL CALC-MCNC: 128 MG/DL (ref 0–100)
Lab: NORMAL
MICROALBUMIN UR-MCNC: 65.1 UG/ML
POTASSIUM SERPL-SCNC: 4.1 MMOL/L (ref 3.5–5.2)
PROT SERPL-MCNC: 7.1 G/DL (ref 6–8.5)
PSA SERPL-MCNC: 1.65 NG/ML (ref 0–4)
SHBG SERPL-SCNC: 24.4 NMOL/L (ref 19.3–76.4)
SODIUM SERPL-SCNC: 138 MMOL/L (ref 136–145)
T4 SERPL-MCNC: 6.28 MCG/DL (ref 4.5–11.7)
TESTOST FREE SERPL-MCNC: 22.8 PG/ML (ref 6.6–18.1)
TESTOST SERPL-MCNC: 846 NG/DL (ref 264–916)
TRIGL SERPL-MCNC: 161 MG/DL (ref 0–150)
TSH SERPL DL<=0.005 MIU/L-ACNC: 4.04 UIU/ML (ref 0.27–4.2)
URATE SERPL-MCNC: 6.7 MG/DL (ref 3.4–7)
VLDLC SERPL CALC-MCNC: 32.2 MG/DL

## 2020-07-29 RX ORDER — TESTOSTERONE GEL, 1% 10 MG/G
GEL TRANSDERMAL
Qty: 180 TUBE | Refills: 1 | Status: SHIPPED | OUTPATIENT
Start: 2020-07-29 | End: 2020-08-10 | Stop reason: SDUPTHER

## 2020-08-10 ENCOUNTER — OFFICE VISIT (OUTPATIENT)
Dept: ENDOCRINOLOGY | Age: 80
End: 2020-08-10

## 2020-08-10 VITALS
HEIGHT: 67 IN | WEIGHT: 188.8 LBS | SYSTOLIC BLOOD PRESSURE: 146 MMHG | DIASTOLIC BLOOD PRESSURE: 82 MMHG | RESPIRATION RATE: 16 BRPM | BODY MASS INDEX: 29.63 KG/M2

## 2020-08-10 DIAGNOSIS — E29.1 HYPOGONADISM IN MALE: ICD-10-CM

## 2020-08-10 DIAGNOSIS — I15.2 OBESITY, DIABETES, AND HYPERTENSION SYNDROME (HCC): ICD-10-CM

## 2020-08-10 DIAGNOSIS — I10 ESSENTIAL HYPERTENSION: ICD-10-CM

## 2020-08-10 DIAGNOSIS — R73.9 HYPERGLYCEMIA: ICD-10-CM

## 2020-08-10 DIAGNOSIS — N40.0 BENIGN NON-NODULAR PROSTATIC HYPERPLASIA WITHOUT LOWER URINARY TRACT SYMPTOMS: ICD-10-CM

## 2020-08-10 DIAGNOSIS — E79.0 HYPERURICEMIA: ICD-10-CM

## 2020-08-10 DIAGNOSIS — E78.5 DYSLIPIDEMIA: ICD-10-CM

## 2020-08-10 DIAGNOSIS — E11.9 CONTROLLED TYPE 2 DIABETES MELLITUS WITHOUT COMPLICATION, WITHOUT LONG-TERM CURRENT USE OF INSULIN (HCC): Primary | ICD-10-CM

## 2020-08-10 DIAGNOSIS — E11.69 OBESITY, DIABETES, AND HYPERTENSION SYNDROME (HCC): ICD-10-CM

## 2020-08-10 DIAGNOSIS — I10 BENIGN ESSENTIAL HYPERTENSION: ICD-10-CM

## 2020-08-10 DIAGNOSIS — N52.8 OTHER MALE ERECTILE DYSFUNCTION: ICD-10-CM

## 2020-08-10 DIAGNOSIS — E66.9 OBESITY, DIABETES, AND HYPERTENSION SYNDROME (HCC): ICD-10-CM

## 2020-08-10 DIAGNOSIS — E11.59 OBESITY, DIABETES, AND HYPERTENSION SYNDROME (HCC): ICD-10-CM

## 2020-08-10 DIAGNOSIS — R79.89 LOW TESTOSTERONE IN MALE: ICD-10-CM

## 2020-08-10 DIAGNOSIS — E55.9 VITAMIN D DEFICIENCY: ICD-10-CM

## 2020-08-10 DIAGNOSIS — R79.89 LOW TESTOSTERONE: ICD-10-CM

## 2020-08-10 PROCEDURE — 99214 OFFICE O/P EST MOD 30 MIN: CPT | Performed by: INTERNAL MEDICINE

## 2020-08-10 RX ORDER — OLMESARTAN MEDOXOMIL, AMLODIPINE AND HYDROCHLOROTHIAZIDE TABLET 40/10/25 MG 40; 10; 25 MG/1; MG/1; MG/1
1 TABLET ORAL DAILY
Qty: 90 TABLET | Refills: 3 | Status: SHIPPED | OUTPATIENT
Start: 2020-08-10 | End: 2021-09-13

## 2020-08-10 RX ORDER — ERGOCALCIFEROL 1.25 MG/1
50000 CAPSULE ORAL 2 TIMES WEEKLY
Qty: 26 CAPSULE | Refills: 3 | Status: SHIPPED | OUTPATIENT
Start: 2020-08-10 | End: 2021-06-23

## 2020-08-10 RX ORDER — LINAGLIPTIN 5 MG/1
5 TABLET, FILM COATED ORAL DAILY
Qty: 90 TABLET | Refills: 3 | Status: SHIPPED | OUTPATIENT
Start: 2020-08-10 | End: 2021-03-22

## 2020-08-10 RX ORDER — FINASTERIDE 5 MG/1
5 TABLET, FILM COATED ORAL DAILY
Qty: 90 TABLET | Refills: 3 | Status: SHIPPED | OUTPATIENT
Start: 2020-08-10 | End: 2021-11-08 | Stop reason: SDUPTHER

## 2020-08-10 RX ORDER — ALLOPURINOL 100 MG/1
100 TABLET ORAL DAILY
Qty: 90 TABLET | Refills: 3 | Status: SHIPPED | OUTPATIENT
Start: 2020-08-10 | End: 2021-03-22

## 2020-08-10 RX ORDER — EZETIMIBE 10 MG/1
10 TABLET ORAL DAILY
Qty: 90 TABLET | Refills: 3 | Status: SHIPPED | OUTPATIENT
Start: 2020-08-10

## 2020-08-10 RX ORDER — TESTOSTERONE GEL, 1% 10 MG/G
GEL TRANSDERMAL
Qty: 180 TUBE | Refills: 1 | Status: SHIPPED | OUTPATIENT
Start: 2020-08-10 | End: 2021-04-05

## 2020-08-10 RX ORDER — TADALAFIL 5 MG/1
5 TABLET ORAL DAILY PRN
Qty: 90 TABLET | Refills: 3 | Status: SHIPPED | OUTPATIENT
Start: 2020-08-10 | End: 2020-12-01

## 2020-08-10 RX ORDER — PIOGLITAZONEHYDROCHLORIDE 15 MG/1
15 TABLET ORAL DAILY
Qty: 90 TABLET | Refills: 3 | Status: SHIPPED | OUTPATIENT
Start: 2020-08-10 | End: 2021-03-22

## 2020-08-10 NOTE — PROGRESS NOTES
"Subjective   Randy Mccann is a 80 y.o. male seen for follow up for Hypertension; Diabetes; Hypogonadism; Erectile Dysfunction; and lab review  He is not checking BG. He denies any problems or concerns.       History of Present Illness is a 80-year-old gentleman known patient with type 2 diabetes hypertension and dyslipidemia as well as hyperuricemia and low testosterone.  Over the course of last 6 months he has had no significant health problem for which to go to the emergency room or hospital.    The following portions of the patient's history were reviewed and updated as appropriate: allergies, current medications, past family history, past medical history, past social history, past surgical history and problem list.      /82   Resp 16   Ht 170.2 cm (67.01\")   Wt 85.6 kg (188 lb 12.8 oz)   BMI 29.56 kg/m²      Allergies   Allergen Reactions   • Statins Other (See Comments)     Cramps,joint pain  Cramps,joint pain  Cramps,joint pain          Current Outpatient Medications:   •  allopurinol (ZYLOPRIM) 100 MG tablet, Take 1 tablet by mouth Daily., Disp: 90 tablet, Rfl: 3  •  aspirin 81 MG EC tablet, Take 81 mg by mouth Daily., Disp: , Rfl:   •  ezetimibe (ZETIA) 10 MG tablet, Take 1 tablet by mouth Daily., Disp: 90 tablet, Rfl: 3  •  finasteride (PROSCAR) 5 MG tablet, Take 1 tablet by mouth Daily., Disp: 90 tablet, Rfl: 3  •  Multiple Vitamins-Minerals (MULTI VITAMIN/MINERALS) tablet, Take 1 tablet by mouth daily., Disp: , Rfl:   •  Olmesartan-amLODIPine-HCTZ 40-10-25 MG tablet, Take 1 tablet by mouth Daily., Disp: 90 tablet, Rfl: 3  •  pioglitazone (ACTOS) 15 MG tablet, Take 1 tablet by mouth Daily., Disp: 90 tablet, Rfl: 3  •  tadalafil (CIALIS) 5 MG tablet, Take 1 tablet by mouth Daily As Needed for Erectile Dysfunction., Disp: 90 tablet, Rfl: 3  •  testosterone (ANDROGEL) 50 MG/5GM (1%) gel gel, 1 tube on each shoulder daily, Disp: 180 tube, Rfl: 1  •  TRADJENTA 5 MG tablet tablet, Take 1 tablet by " mouth Daily., Disp: 30 tablet, Rfl: 5  •  vitamin D (ERGOCALCIFEROL) 1.25 MG (53793 UT) capsule capsule, TAKE 1 CAPSULE BY MOUTH 2 TIMES A WEEK, Disp: 26 capsule, Rfl: 3     Review of Systems   Constitutional: Negative.    HENT: Negative.    Eyes: Negative.    Respiratory: Negative.    Cardiovascular: Negative.    Gastrointestinal: Negative.    Endocrine: Negative.    Genitourinary: Negative.    Musculoskeletal: Negative.    Skin: Negative.    Allergic/Immunologic: Negative.    Neurological: Negative.    Hematological: Negative.    Psychiatric/Behavioral: Negative.      The above review of system was reviewed, corroborated and accepted.  Objective   Physical Exam   Constitutional: He is oriented to person, place, and time. He appears well-developed and well-nourished. No distress.   HENT:   Head: Normocephalic and atraumatic.   Right Ear: External ear normal.   Left Ear: External ear normal.   Nose: Nose normal.   Mouth/Throat: Oropharynx is clear and moist. No oropharyngeal exudate.   Eyes: Pupils are equal, round, and reactive to light. Conjunctivae and EOM are normal. Right eye exhibits no discharge. Left eye exhibits no discharge. No scleral icterus.   Neck: Normal range of motion. Neck supple. No JVD present. No tracheal deviation present. No thyromegaly present.   Cardiovascular: Normal rate, regular rhythm, normal heart sounds and intact distal pulses. Exam reveals no gallop and no friction rub.   No murmur heard.  Healed the scar of heart valve surgery in the anterior midline chest.   Pulmonary/Chest: Effort normal and breath sounds normal. No stridor. No respiratory distress. He has no wheezes. He has no rales. He exhibits no tenderness.   Abdominal: Soft. Bowel sounds are normal. He exhibits no distension and no mass. There is no tenderness. There is no rebound and no guarding. No hernia.   Musculoskeletal: Normal range of motion. He exhibits no edema, tenderness or deformity.   Lymphadenopathy:     He has  no cervical adenopathy.   Neurological: He is alert and oriented to person, place, and time. He has normal reflexes. He displays normal reflexes. No cranial nerve deficit or sensory deficit. He exhibits normal muscle tone. Coordination normal.   Skin: Skin is warm. No rash noted. He is not diaphoretic. No erythema. No pallor.   Psychiatric: He has a normal mood and affect. His behavior is normal. Judgment and thought content normal.   Nursing note and vitals reviewed.  No significant change since 11/26/2019 office visit.    Results for orders placed or performed in visit on 05/12/20   TestT+TestF+SHBG   Result Value Ref Range    Testosterone, Total 846 264 - 916 ng/dL    Testosterone, Free 22.8 (H) 6.6 - 18.1 pg/mL    Sex Hormone Binding Globulin 24.4 19.3 - 76.4 nmol/L   T4 & TSH ([facility])   Result Value Ref Range    TSH 4.040 0.270 - 4.200 uIU/mL    T4, Total 6.28 4.50 - 11.70 mcg/dL   Uric Acid   Result Value Ref Range    Uric Acid 6.7 3.4 - 7.0 mg/dL   Vitamin D 25 Hydroxy   Result Value Ref Range    25 Hydroxy, Vitamin D 56.5 30.0 - 100.0 ng/ml   Comprehensive Metabolic Panel   Result Value Ref Range    Glucose 132 (H) 65 - 99 mg/dL    BUN 21 8 - 23 mg/dL    Creatinine 1.31 (H) 0.76 - 1.27 mg/dL    eGFR Non African Am 53 (L) >60 mL/min/1.73    eGFR African Am 64 >60 mL/min/1.73    BUN/Creatinine Ratio 16.0 7.0 - 25.0    Sodium 138 136 - 145 mmol/L    Potassium 4.1 3.5 - 5.2 mmol/L    Chloride 98 98 - 107 mmol/L    Total CO2 27.9 22.0 - 29.0 mmol/L    Calcium 9.6 8.6 - 10.5 mg/dL    Total Protein 7.1 6.0 - 8.5 g/dL    Albumin 5.00 3.50 - 5.20 g/dL    Globulin 2.1 gm/dL    A/G Ratio 2.4 g/dL    Total Bilirubin 1.1 0.0 - 1.2 mg/dL    Alkaline Phosphatase 64 39 - 117 U/L    AST (SGOT) 38 1 - 40 U/L    ALT (SGPT) 27 1 - 41 U/L   C-Peptide   Result Value Ref Range    C-Peptide 2.5 1.1 - 4.4 ng/mL   Hemoglobin A1c   Result Value Ref Range    Hemoglobin A1C 6.70 (H) 4.80 - 5.60 %   MicroAlbumin, Urine, Random - Urine,  Clean Catch   Result Value Ref Range    Microalbumin, Urine 65.1 Not Estab. ug/mL   Lipid Panel   Result Value Ref Range    Total Cholesterol 207 (H) 0 - 200 mg/dL    Triglycerides 161 (H) 0 - 150 mg/dL    HDL Cholesterol 47 40 - 60 mg/dL    VLDL Cholesterol 32.2 mg/dL    LDL Cholesterol  128 (H) 0 - 100 mg/dL   PSA DIAGNOSTIC   Result Value Ref Range    PSA 1.650 0.000 - 4.000 ng/mL   Hemoglobin & Hematocrit, Blood   Result Value Ref Range    Hemoglobin 17.0 13.0 - 17.7 g/dL    Hematocrit 51.8 (H) 37.5 - 51.0 %   Cardiovascular Risk Assessment   Result Value Ref Range    Interpretation Note    Diabetes Patient Education   Result Value Ref Range    PDF Image Not applicable         Assessment/Plan   Randy was seen today for hypertension, diabetes, hypogonadism, erectile dysfunction and lab review.    Diagnoses and all orders for this visit:    Controlled type 2 diabetes mellitus without complication, without long-term current use of insulin (CMS/[facility])  -     T4 & TSH ([facility]); Future  -     TestT+TestF+SHBG; Future  -     Uric Acid; Future  -     Vitamin D 25 Hydroxy; Future  -     Comprehensive Metabolic Panel; Future  -     C-Peptide; Future  -     Hemoglobin A1c; Future  -     MicroAlbumin, Urine, Random - Urine, Clean Catch; Future  -     PSA DIAGNOSTIC; Future  -     NMR LipoProfile; Future  -     Hemoglobin & Hematocrit, Blood; Future    Benign essential hypertension  -     T4 & TSH ([facility]); Future  -     TestT+TestF+SHBG; Future  -     Uric Acid; Future  -     Vitamin D 25 Hydroxy; Future  -     Comprehensive Metabolic Panel; Future  -     C-Peptide; Future  -     Hemoglobin A1c; Future  -     MicroAlbumin, Urine, Random - Urine, Clean Catch; Future  -     PSA DIAGNOSTIC; Future  -     NMR LipoProfile; Future  -     Hemoglobin & Hematocrit, Blood; Future    Obesity, diabetes, and hypertension syndrome (CMS/[facility])  -     T4 & TSH ([facility]); Future  -     TestT+TestF+SHBG; Future  -     Uric Acid; Future  -      Vitamin D 25 Hydroxy; Future  -     Comprehensive Metabolic Panel; Future  -     C-Peptide; Future  -     Hemoglobin A1c; Future  -     MicroAlbumin, Urine, Random - Urine, Clean Catch; Future  -     PSA DIAGNOSTIC; Future  -     NMR LipoProfile; Future  -     Hemoglobin & Hematocrit, Blood; Future    Essential hypertension  -     ezetimibe (ZETIA) 10 MG tablet; Take 1 tablet by mouth Daily.  -     Olmesartan-amLODIPine-HCTZ 40-10-25 MG tablet; Take 1 tablet by mouth Daily.  -     tadalafil (Cialis) 5 MG tablet; Take 1 tablet by mouth Daily As Needed for Erectile Dysfunction.  -     vitamin D (ERGOCALCIFEROL) 1.25 MG (76206 UT) capsule capsule; Take 1 capsule by mouth 2 (Two) Times a Week.  -     T4 & TSH ([facility]); Future  -     TestT+TestF+SHBG; Future  -     Uric Acid; Future  -     Vitamin D 25 Hydroxy; Future  -     Comprehensive Metabolic Panel; Future  -     C-Peptide; Future  -     Hemoglobin A1c; Future  -     MicroAlbumin, Urine, Random - Urine, Clean Catch; Future  -     PSA DIAGNOSTIC; Future  -     NMR LipoProfile; Future  -     Hemoglobin & Hematocrit, Blood; Future    Vitamin D deficiency  -     ezetimibe (ZETIA) 10 MG tablet; Take 1 tablet by mouth Daily.  -     Olmesartan-amLODIPine-HCTZ 40-10-25 MG tablet; Take 1 tablet by mouth Daily.  -     tadalafil (Cialis) 5 MG tablet; Take 1 tablet by mouth Daily As Needed for Erectile Dysfunction.  -     vitamin D (ERGOCALCIFEROL) 1.25 MG (37131 UT) capsule capsule; Take 1 capsule by mouth 2 (Two) Times a Week.  -     T4 & TSH ([facility]); Future  -     TestT+TestF+SHBG; Future  -     Uric Acid; Future  -     Vitamin D 25 Hydroxy; Future  -     Comprehensive Metabolic Panel; Future  -     C-Peptide; Future  -     Hemoglobin A1c; Future  -     MicroAlbumin, Urine, Random - Urine, Clean Catch; Future  -     PSA DIAGNOSTIC; Future  -     NMR LipoProfile; Future  -     Hemoglobin & Hematocrit, Blood; Future    Hypogonadism in male  -     T4 & TSH ([facility]);  Future  -     TestT+TestF+SHBG; Future  -     Uric Acid; Future  -     Vitamin D 25 Hydroxy; Future  -     Comprehensive Metabolic Panel; Future  -     C-Peptide; Future  -     Hemoglobin A1c; Future  -     MicroAlbumin, Urine, Random - Urine, Clean Catch; Future  -     PSA DIAGNOSTIC; Future  -     NMR LipoProfile; Future  -     Hemoglobin & Hematocrit, Blood; Future    Benign non-nodular prostatic hyperplasia without lower urinary tract symptoms  -     T4 & TSH ([facility]); Future  -     TestT+TestF+SHBG; Future  -     Uric Acid; Future  -     Vitamin D 25 Hydroxy; Future  -     Comprehensive Metabolic Panel; Future  -     C-Peptide; Future  -     Hemoglobin A1c; Future  -     MicroAlbumin, Urine, Random - Urine, Clean Catch; Future  -     PSA DIAGNOSTIC; Future  -     NMR LipoProfile; Future  -     Hemoglobin & Hematocrit, Blood; Future    Hyperuricemia  -     ezetimibe (ZETIA) 10 MG tablet; Take 1 tablet by mouth Daily.  -     Olmesartan-amLODIPine-HCTZ 40-10-25 MG tablet; Take 1 tablet by mouth Daily.  -     tadalafil (Cialis) 5 MG tablet; Take 1 tablet by mouth Daily As Needed for Erectile Dysfunction.  -     vitamin D (ERGOCALCIFEROL) 1.25 MG (88291 UT) capsule capsule; Take 1 capsule by mouth 2 (Two) Times a Week.  -     T4 & TSH ([facility]); Future  -     TestT+TestF+SHBG; Future  -     Uric Acid; Future  -     Vitamin D 25 Hydroxy; Future  -     Comprehensive Metabolic Panel; Future  -     C-Peptide; Future  -     Hemoglobin A1c; Future  -     MicroAlbumin, Urine, Random - Urine, Clean Catch; Future  -     PSA DIAGNOSTIC; Future  -     NMR LipoProfile; Future  -     Hemoglobin & Hematocrit, Blood; Future    Low testosterone  -     ezetimibe (ZETIA) 10 MG tablet; Take 1 tablet by mouth Daily.  -     Olmesartan-amLODIPine-HCTZ 40-10-25 MG tablet; Take 1 tablet by mouth Daily.  -     tadalafil (Cialis) 5 MG tablet; Take 1 tablet by mouth Daily As Needed for Erectile Dysfunction.  -     vitamin D (ERGOCALCIFEROL)  1.25 MG (33826 UT) capsule capsule; Take 1 capsule by mouth 2 (Two) Times a Week.  -     T4 & TSH ([facility]); Future  -     TestT+TestF+SHBG; Future  -     Uric Acid; Future  -     Vitamin D 25 Hydroxy; Future  -     Comprehensive Metabolic Panel; Future  -     C-Peptide; Future  -     Hemoglobin A1c; Future  -     MicroAlbumin, Urine, Random - Urine, Clean Catch; Future  -     PSA DIAGNOSTIC; Future  -     NMR LipoProfile; Future  -     Hemoglobin & Hematocrit, Blood; Future    Other male erectile dysfunction  -     ezetimibe (ZETIA) 10 MG tablet; Take 1 tablet by mouth Daily.  -     Olmesartan-amLODIPine-HCTZ 40-10-25 MG tablet; Take 1 tablet by mouth Daily.  -     tadalafil (Cialis) 5 MG tablet; Take 1 tablet by mouth Daily As Needed for Erectile Dysfunction.  -     vitamin D (ERGOCALCIFEROL) 1.25 MG (54670 UT) capsule capsule; Take 1 capsule by mouth 2 (Two) Times a Week.  -     T4 & TSH ([facility]); Future  -     TestT+TestF+SHBG; Future  -     Uric Acid; Future  -     Vitamin D 25 Hydroxy; Future  -     Comprehensive Metabolic Panel; Future  -     C-Peptide; Future  -     Hemoglobin A1c; Future  -     MicroAlbumin, Urine, Random - Urine, Clean Catch; Future  -     PSA DIAGNOSTIC; Future  -     NMR LipoProfile; Future  -     Hemoglobin & Hematocrit, Blood; Future    Dyslipidemia  -     ezetimibe (ZETIA) 10 MG tablet; Take 1 tablet by mouth Daily.  -     Olmesartan-amLODIPine-HCTZ 40-10-25 MG tablet; Take 1 tablet by mouth Daily.  -     tadalafil (Cialis) 5 MG tablet; Take 1 tablet by mouth Daily As Needed for Erectile Dysfunction.  -     vitamin D (ERGOCALCIFEROL) 1.25 MG (05662 UT) capsule capsule; Take 1 capsule by mouth 2 (Two) Times a Week.  -     T4 & TSH ([facility]); Future  -     TestT+TestF+SHBG; Future  -     Uric Acid; Future  -     Vitamin D 25 Hydroxy; Future  -     Comprehensive Metabolic Panel; Future  -     C-Peptide; Future  -     Hemoglobin A1c; Future  -     MicroAlbumin, Urine, Random - Urine,  Clean Catch; Future  -     PSA DIAGNOSTIC; Future  -     NMR LipoProfile; Future  -     Hemoglobin & Hematocrit, Blood; Future    Hyperglycemia  -     ezetimibe (ZETIA) 10 MG tablet; Take 1 tablet by mouth Daily.  -     Olmesartan-amLODIPine-HCTZ 40-10-25 MG tablet; Take 1 tablet by mouth Daily.  -     tadalafil (Cialis) 5 MG tablet; Take 1 tablet by mouth Daily As Needed for Erectile Dysfunction.  -     vitamin D (ERGOCALCIFEROL) 1.25 MG (30548 UT) capsule capsule; Take 1 capsule by mouth 2 (Two) Times a Week.  -     T4 & TSH ([facility]); Future  -     TestT+TestF+SHBG; Future  -     Uric Acid; Future  -     Vitamin D 25 Hydroxy; Future  -     Comprehensive Metabolic Panel; Future  -     C-Peptide; Future  -     Hemoglobin A1c; Future  -     MicroAlbumin, Urine, Random - Urine, Clean Catch; Future  -     PSA DIAGNOSTIC; Future  -     NMR LipoProfile; Future  -     Hemoglobin & Hematocrit, Blood; Future    Low testosterone in male  -     testosterone (ANDROGEL) 50 MG/5GM (1%) gel gel; 1 tube on each shoulder daily  -     T4 & TSH ([facility]); Future  -     TestT+TestF+SHBG; Future  -     Uric Acid; Future  -     Vitamin D 25 Hydroxy; Future  -     Comprehensive Metabolic Panel; Future  -     C-Peptide; Future  -     Hemoglobin A1c; Future  -     MicroAlbumin, Urine, Random - Urine, Clean Catch; Future  -     PSA DIAGNOSTIC; Future  -     NMR LipoProfile; Future  -     Hemoglobin & Hematocrit, Blood; Future    Other orders  -     allopurinol (Zyloprim) 100 MG tablet; Take 1 tablet by mouth Daily.  -     finasteride (PROSCAR) 5 MG tablet; Take 1 tablet by mouth Daily.  -     pioglitazone (Actos) 15 MG tablet; Take 1 tablet by mouth Daily.  -     TRADJENTA 5 MG tablet tablet; Take 1 tablet by mouth Daily.      In summary I saw and examined this 80-year-old gentleman for above-mentioned problems.  I reviewed his laboratory evaluation of 7/27/2020 and provided him with a copy of it.  Overall he is clinically and  metabolically  Therefore we will continue all his current prescriptions.  I will see him in 6 months or sooner if needed with laboratory evaluation at this visit.

## 2020-11-26 DIAGNOSIS — E55.9 VITAMIN D DEFICIENCY: ICD-10-CM

## 2020-11-26 DIAGNOSIS — R73.9 HYPERGLYCEMIA: ICD-10-CM

## 2020-11-26 DIAGNOSIS — I10 ESSENTIAL HYPERTENSION: ICD-10-CM

## 2020-11-26 DIAGNOSIS — E79.0 HYPERURICEMIA: ICD-10-CM

## 2020-11-26 DIAGNOSIS — N52.8 OTHER MALE ERECTILE DYSFUNCTION: ICD-10-CM

## 2020-11-26 DIAGNOSIS — R79.89 LOW TESTOSTERONE: ICD-10-CM

## 2020-11-26 DIAGNOSIS — E78.5 DYSLIPIDEMIA: ICD-10-CM

## 2020-12-01 RX ORDER — TADALAFIL 5 MG/1
TABLET ORAL
Qty: 90 TABLET | Refills: 1 | Status: SHIPPED | OUTPATIENT
Start: 2020-12-01 | End: 2021-06-01

## 2021-03-03 DIAGNOSIS — I10 BENIGN ESSENTIAL HYPERTENSION: ICD-10-CM

## 2021-03-03 DIAGNOSIS — E79.0 HYPERURICEMIA: ICD-10-CM

## 2021-03-03 DIAGNOSIS — E03.9 PRIMARY HYPOTHYROIDISM: ICD-10-CM

## 2021-03-03 DIAGNOSIS — E55.9 VITAMIN D DEFICIENCY: ICD-10-CM

## 2021-03-03 DIAGNOSIS — E11.9 CONTROLLED TYPE 2 DIABETES MELLITUS WITHOUT COMPLICATION, WITHOUT LONG-TERM CURRENT USE OF INSULIN (HCC): Primary | ICD-10-CM

## 2021-03-03 DIAGNOSIS — E78.5 DYSLIPIDEMIA: ICD-10-CM

## 2021-03-03 DIAGNOSIS — E29.1 HYPOGONADISM IN MALE: ICD-10-CM

## 2021-03-08 ENCOUNTER — LAB (OUTPATIENT)
Dept: ENDOCRINOLOGY | Age: 81
End: 2021-03-08

## 2021-03-08 DIAGNOSIS — E03.9 PRIMARY HYPOTHYROIDISM: ICD-10-CM

## 2021-03-08 DIAGNOSIS — E29.1 HYPOGONADISM IN MALE: ICD-10-CM

## 2021-03-14 LAB
TESTOST FREE MFR SERPL: 4.13 % (ref 1.5–4.2)
TESTOST FREE SERPL-MCNC: 16.79 NG/DL (ref 5–21)
TESTOST SERPL-MCNC: 406.5 NG/DL (ref 264–916)
TSH SERPL DL<=0.005 MIU/L-ACNC: 3.67 UIU/ML (ref 0.27–4.2)

## 2021-03-15 NOTE — PROGRESS NOTES
Subjective   Randy Mccann is a 80 y.o. male.        F/u from dr Piña for dm 2, hypertension, obesity, vitamin d def, hypogonadism, hyperplasia, ED/ pt doesn't test bs  / last dm eye exam / last dm foot exam today with dr Pulido      Patient is a 80-year-old male came in for follow-up.  He is new to me.    He has known diabetes mellitus since 2015.  He went off Actos 15 mg and Tradjenta 5 mg at least 6 months ago.  He does not check his blood sugars at home.  His last meal was at 9 AM.    His last eye examination was in 2019.  He has no retinopathy.  He denies associated nephropathy.  He denies numbness, tingling or burning in his hands or feet.    He has hypertension and coronary artery disease.  He had 1 vessel coronary artery bypass and porcine aortic valve replacement at [facility] in 2018.  He is on Tribenzor.  He denies history of heart attack or stroke.  He denies chest pain or shortness of breath.  His cardiologist is Dr. Enrrique Ogden.    He has vitamin D deficiency and is on ergocalciferol 50,000 units twice a week.  He has occasional diarrhea.  He has no history of celiac disease.    He has low testosterone and has been on AndroGel 1% 1 tube on each arm for several years.  He does not know the cause of the low testosterone.  He is  and has fathered 2 children.  He is not planning any more children.    He denies headaches, heat or cold intolerance, changes in shoes or ring size, easy bruising, or muscle weakness.    He has history of enlarged prostate and is on finasteride 5 mg once a day.  PSA done in July 2020 is normal at 1.65 ng/mL.  He denies dribbling, hesitancy, or retention.    He has hyperlipidemia and is on Zetia 10 mg/day for about 4 years.  He had muscle pain with Crestor in the past.  He has never used Lipitor or pravastatin.      The following portions of the patient's history were reviewed and updated as appropriate: allergies, current medications, past family history, past medical  "history, past social history, past surgical history and problem list.    Review of Systems   Respiratory: Negative for shortness of breath.    Cardiovascular: Negative for chest pain and palpitations.   Gastrointestinal: Negative.    Endocrine: Negative for cold intolerance and heat intolerance.   Genitourinary: Negative.    Musculoskeletal: Negative for myalgias.   Neurological: Negative for numbness.     Objective      Vitals:    03/22/21 1420   BP: 170/92   Weight: 83.1 kg (183 lb 3.2 oz)   Height: 177.8 cm (70\")     Physical Exam  Constitutional:       General: He is not in acute distress.     Appearance: Normal appearance. He is not ill-appearing, toxic-appearing or diaphoretic.   Eyes:      General: No scleral icterus.        Right eye: No discharge.         Left eye: No discharge.      Conjunctiva/sclera: Conjunctivae normal.   Neck:      Vascular: No carotid bruit.   Cardiovascular:      Rate and Rhythm: Normal rate and regular rhythm.      Heart sounds: Normal heart sounds. No murmur heard.   No gallop.    Pulmonary:      Effort: Pulmonary effort is normal. No respiratory distress.      Breath sounds: No stridor. No rales.   Chest:      Chest wall: No tenderness.   Abdominal:      General: Bowel sounds are normal.      Palpations: Abdomen is soft.      Tenderness: There is no abdominal tenderness.   Musculoskeletal:         General: No tenderness.   Lymphadenopathy:      Cervical: No cervical adenopathy.   Neurological:      Mental Status: He is alert and oriented to person, place, and time.      Comments: Intact light touch in lower extremities   Psychiatric:         Mood and Affect: Mood normal.         Behavior: Behavior normal.       Lab on 03/08/2021   Component Date Value Ref Range Status   • Testosterone, Total 03/08/2021 406.5  264.0 - 916.0 ng/dL Final    Comment: This [facility] LC/MS-MS method is currently certified by the CDC  Hormone Standardization Program (HoSt). Adult male reference  interval " is based on a population of healthy nonobese males  (BMI <30) between 19 and 39 years old. Kemal et.al. JCEM  2017,102;3951-7960. PMID: 06571645.     • Testosterone, Free 03/08/2021 16.79  5.00 - 21.00 ng/dL Final   • Testosterone, Free % 03/08/2021 4.13  1.50 - 4.20 % Final   • TSH 03/08/2021 3.670  0.270 - 4.200 uIU/mL Final     Assessment/Plan   Diagnoses and all orders for this visit:    1. Type 2 diabetes mellitus without complication, without long-term current use of insulin (CMS/[facility]) (Primary)  -     Comprehensive Metabolic Panel  -     Hemoglobin A1c  -     TSH  -     T4, Free  -     Microalbumin / Creatinine Urine Ratio - Urine, Clean Catch  -     Celiac Disease Panel    2. Low testosterone  -     Comprehensive Metabolic Panel  -     TSH  -     T4, Free  -     Prolactin  -     Insulin-like Growth Factor  -     Ferritin  -     Iron Profile  -     Transferrin Saturation  -     CBC & Differential    3. Benign essential hypertension  -     Comprehensive Metabolic Panel    4. Dyslipidemia  -     Comprehensive Metabolic Panel  -     Lipid Panel    5. Hypogonadism in male  -     Comprehensive Metabolic Panel  -     Prolactin  -     Insulin-like Growth Factor  -     Ferritin  -     Iron Profile  -     Transferrin Saturation  -     CBC & Differential    6. Essential hypertension  -     Comprehensive Metabolic Panel    7. Coronary artery disease involving native coronary artery of native heart without angina pectoris  -     Comprehensive Metabolic Panel  -     Lipid Panel    8. History of aortic valve replacement with porcine valve      Continue no concentrated sweet low-fat diet.  Check hemoglobin A1c and urine microalbumin.  Advised to have a yearly eye examination.  Continue AndroGel 1% 1 tube on each shoulder once a day.  Check prolactin, IGF-I, ferritin, iron profile, transferrin saturation and CBC.  Continue Tribenzor.  Will defer blood pressure treatment to cardiologist  Continue Zetia 10 mg/day  Patient  advised to get a PCP.    Follow-up in 4 months     copy of my note sent to Dr. nErrique Ogden    Total time 53 minutes

## 2021-03-22 ENCOUNTER — OFFICE VISIT (OUTPATIENT)
Dept: ENDOCRINOLOGY | Age: 81
End: 2021-03-22

## 2021-03-22 VITALS
DIASTOLIC BLOOD PRESSURE: 92 MMHG | WEIGHT: 183.2 LBS | SYSTOLIC BLOOD PRESSURE: 170 MMHG | BODY MASS INDEX: 26.23 KG/M2 | HEIGHT: 70 IN

## 2021-03-22 DIAGNOSIS — E29.1 HYPOGONADISM IN MALE: ICD-10-CM

## 2021-03-22 DIAGNOSIS — R79.89 LOW TESTOSTERONE: ICD-10-CM

## 2021-03-22 DIAGNOSIS — I10 ESSENTIAL HYPERTENSION: ICD-10-CM

## 2021-03-22 DIAGNOSIS — I10 BENIGN ESSENTIAL HYPERTENSION: ICD-10-CM

## 2021-03-22 DIAGNOSIS — I25.10 CORONARY ARTERY DISEASE INVOLVING NATIVE CORONARY ARTERY OF NATIVE HEART WITHOUT ANGINA PECTORIS: ICD-10-CM

## 2021-03-22 DIAGNOSIS — Z78.9 STATIN INTOLERANCE: ICD-10-CM

## 2021-03-22 DIAGNOSIS — E11.9 TYPE 2 DIABETES MELLITUS WITHOUT COMPLICATION, WITHOUT LONG-TERM CURRENT USE OF INSULIN (HCC): Primary | ICD-10-CM

## 2021-03-22 DIAGNOSIS — E78.5 DYSLIPIDEMIA: ICD-10-CM

## 2021-03-22 DIAGNOSIS — Z95.3 HISTORY OF AORTIC VALVE REPLACEMENT WITH PORCINE VALVE: ICD-10-CM

## 2021-03-22 PROCEDURE — 99215 OFFICE O/P EST HI 40 MIN: CPT | Performed by: INTERNAL MEDICINE

## 2021-03-25 LAB
ALBUMIN SERPL-MCNC: 4.5 G/DL (ref 3.7–4.7)
ALBUMIN/CREAT UR: 34 MG/G CREAT (ref 0–29)
ALBUMIN/GLOB SERPL: 1.7 {RATIO} (ref 1.2–2.2)
ALP SERPL-CCNC: 94 IU/L (ref 39–117)
ALT SERPL-CCNC: 39 IU/L (ref 0–44)
AST SERPL-CCNC: 41 IU/L (ref 0–40)
BASOPHILS # BLD AUTO: 0.1 X10E3/UL (ref 0–0.2)
BASOPHILS NFR BLD AUTO: 1 %
BILIRUB SERPL-MCNC: 0.9 MG/DL (ref 0–1.2)
BUN SERPL-MCNC: 22 MG/DL (ref 8–27)
BUN/CREAT SERPL: 15 (ref 10–24)
CALCIUM SERPL-MCNC: 9.7 MG/DL (ref 8.6–10.2)
CHLORIDE SERPL-SCNC: 104 MMOL/L (ref 96–106)
CHOLEST SERPL-MCNC: 238 MG/DL (ref 100–199)
CO2 SERPL-SCNC: 22 MMOL/L (ref 20–29)
CREAT SERPL-MCNC: 1.46 MG/DL (ref 0.76–1.27)
CREAT UR-MCNC: 48.5 MG/DL
ENDOMYSIUM IGA SER QL: NEGATIVE
EOSINOPHIL # BLD AUTO: 0.1 X10E3/UL (ref 0–0.4)
EOSINOPHIL NFR BLD AUTO: 1 %
ERYTHROCYTE [DISTWIDTH] IN BLOOD BY AUTOMATED COUNT: 12.4 % (ref 11.6–15.4)
FERRITIN SERPL-MCNC: 24 NG/ML (ref 30–400)
GLOBULIN SER CALC-MCNC: 2.6 G/DL (ref 1.5–4.5)
GLUCOSE SERPL-MCNC: 134 MG/DL (ref 65–99)
HBA1C MFR BLD: 6.7 % (ref 4.8–5.6)
HCT VFR BLD AUTO: 46.5 % (ref 37.5–51)
HDLC SERPL-MCNC: 58 MG/DL
HGB BLD-MCNC: 15.7 G/DL (ref 13–17.7)
IGA SERPL-MCNC: 199 MG/DL (ref 61–437)
IGF-I SERPL-MCNC: 121 NG/ML (ref 45–207)
IMM GRANULOCYTES # BLD AUTO: 0 X10E3/UL (ref 0–0.1)
IMM GRANULOCYTES NFR BLD AUTO: 0 %
IMP & REVIEW OF LAB RESULTS: NORMAL
IRON SATN MFR SERPL: 14 % SATURATION
IRON SATN MFR SERPL: 18 % (ref 15–55)
IRON SERPL-MCNC: 82 UG/DL
IRON SERPL-MCNC: 83 UG/DL (ref 38–169)
LDLC SERPL CALC-MCNC: 137 MG/DL (ref 0–99)
LYMPHOCYTES # BLD AUTO: 1.1 X10E3/UL (ref 0.7–3.1)
LYMPHOCYTES NFR BLD AUTO: 14 %
Lab: NORMAL
MCH RBC QN AUTO: 32.1 PG (ref 26.6–33)
MCHC RBC AUTO-ENTMCNC: 33.8 G/DL (ref 31.5–35.7)
MCV RBC AUTO: 95 FL (ref 79–97)
MICROALBUMIN UR-MCNC: 16.6 UG/ML
MONOCYTES # BLD AUTO: 0.5 X10E3/UL (ref 0.1–0.9)
MONOCYTES NFR BLD AUTO: 7 %
NEUTROPHILS # BLD AUTO: 5.7 X10E3/UL (ref 1.4–7)
NEUTROPHILS NFR BLD AUTO: 77 %
PLATELET # BLD AUTO: 201 X10E3/UL (ref 150–450)
POTASSIUM SERPL-SCNC: 4.9 MMOL/L (ref 3.5–5.2)
PROLACTIN SERPL-MCNC: 6.2 NG/ML (ref 4–15.2)
PROT SERPL-MCNC: 7.1 G/DL (ref 6–8.5)
RBC # BLD AUTO: 4.89 X10E6/UL (ref 4.14–5.8)
REPORT: NORMAL
REPORT: NORMAL
SODIUM SERPL-SCNC: 140 MMOL/L (ref 134–144)
T4 FREE SERPL-MCNC: 1.05 NG/DL (ref 0.82–1.77)
TIBC SERPL-MCNC: 469 UG/DL (ref 250–450)
TRANSFERRIN SERPL-MCNC: 410 MG/DL
TRIGL SERPL-MCNC: 242 MG/DL (ref 0–149)
TSH SERPL DL<=0.005 MIU/L-ACNC: 2.69 UIU/ML (ref 0.45–4.5)
TTG IGA SER-ACNC: <2 U/ML (ref 0–3)
UIBC SERPL-MCNC: 386 UG/DL (ref 111–343)
VLDLC SERPL CALC-MCNC: 43 MG/DL (ref 5–40)
WBC # BLD AUTO: 7.4 X10E3/UL (ref 3.4–10.8)

## 2021-04-03 DIAGNOSIS — R79.89 LOW TESTOSTERONE IN MALE: ICD-10-CM

## 2021-04-05 RX ORDER — TESTOSTERONE GEL, 1% 10 MG/G
GEL TRANSDERMAL
Qty: 60 EACH | Refills: 1 | Status: SHIPPED | OUTPATIENT
Start: 2021-04-05 | End: 2021-04-12

## 2021-04-10 DIAGNOSIS — R79.89 LOW TESTOSTERONE IN MALE: ICD-10-CM

## 2021-04-12 RX ORDER — TESTOSTERONE GEL, 1% 10 MG/G
GEL TRANSDERMAL
Qty: 300 G | Refills: 1 | Status: SHIPPED | OUTPATIENT
Start: 2021-04-12 | End: 2021-10-19

## 2021-04-14 ENCOUNTER — TELEPHONE (OUTPATIENT)
Dept: ENDOCRINOLOGY | Age: 81
End: 2021-04-14

## 2021-04-14 NOTE — TELEPHONE ENCOUNTER
4/17 called and lm for Randy to call reg his labs   -4/14 called and lm for pt to call reg his labs and judys labs       ---- Message from Chito Pulido MD sent at 3/28/2021  8:51 AM EDT -----  Diabetes well controlled.  Serum creatinine mildly elevated.  Urine microalbumin elevated.  On Tribenzor.LDL elevated.  HDL low.  Continue Lipitor 10 mg/day.  Patient is intolerant of Lipitor.Celiac panel negative.Normal prolactin and IGF-I.  Normal hemoglobin and hematocrit.Ferritin levels low.Normal iron.  Start multivitamins with minerals 1 tablet daily.Send copy of labs to Dr. Enrrique Ogden

## 2021-04-17 ENCOUNTER — TELEPHONE (OUTPATIENT)
Dept: ENDOCRINOLOGY | Age: 81
End: 2021-04-17

## 2021-04-21 ENCOUNTER — TELEPHONE (OUTPATIENT)
Dept: ENDOCRINOLOGY | Age: 81
End: 2021-04-21

## 2021-05-12 ENCOUNTER — PRIOR AUTHORIZATION (OUTPATIENT)
Dept: ENDOCRINOLOGY | Age: 81
End: 2021-05-12

## 2021-05-30 DIAGNOSIS — E79.0 HYPERURICEMIA: ICD-10-CM

## 2021-05-30 DIAGNOSIS — R73.9 HYPERGLYCEMIA: ICD-10-CM

## 2021-05-30 DIAGNOSIS — E55.9 VITAMIN D DEFICIENCY: ICD-10-CM

## 2021-05-30 DIAGNOSIS — N52.8 OTHER MALE ERECTILE DYSFUNCTION: ICD-10-CM

## 2021-05-30 DIAGNOSIS — I10 ESSENTIAL HYPERTENSION: ICD-10-CM

## 2021-05-30 DIAGNOSIS — E78.5 DYSLIPIDEMIA: ICD-10-CM

## 2021-05-30 DIAGNOSIS — R79.89 LOW TESTOSTERONE: ICD-10-CM

## 2021-06-01 RX ORDER — TADALAFIL 5 MG/1
TABLET ORAL
Qty: 90 TABLET | Refills: 1 | Status: SHIPPED | OUTPATIENT
Start: 2021-06-01 | End: 2021-08-31

## 2021-06-14 ENCOUNTER — TELEPHONE (OUTPATIENT)
Dept: ENDOCRINOLOGY | Age: 81
End: 2021-06-14

## 2021-06-23 DIAGNOSIS — I10 ESSENTIAL HYPERTENSION: ICD-10-CM

## 2021-06-23 DIAGNOSIS — E78.5 DYSLIPIDEMIA: ICD-10-CM

## 2021-06-23 DIAGNOSIS — R73.9 HYPERGLYCEMIA: ICD-10-CM

## 2021-06-23 DIAGNOSIS — R79.89 LOW TESTOSTERONE: ICD-10-CM

## 2021-06-23 DIAGNOSIS — E55.9 VITAMIN D DEFICIENCY: ICD-10-CM

## 2021-06-23 DIAGNOSIS — E79.0 HYPERURICEMIA: ICD-10-CM

## 2021-06-23 DIAGNOSIS — N52.8 OTHER MALE ERECTILE DYSFUNCTION: ICD-10-CM

## 2021-06-23 RX ORDER — ERGOCALCIFEROL 1.25 MG/1
CAPSULE ORAL
Qty: 26 CAPSULE | Refills: 1 | Status: SHIPPED | OUTPATIENT
Start: 2021-06-23 | End: 2022-01-18

## 2021-08-31 DIAGNOSIS — R73.9 HYPERGLYCEMIA: ICD-10-CM

## 2021-08-31 DIAGNOSIS — E55.9 VITAMIN D DEFICIENCY: ICD-10-CM

## 2021-08-31 DIAGNOSIS — I10 ESSENTIAL HYPERTENSION: ICD-10-CM

## 2021-08-31 DIAGNOSIS — N52.8 OTHER MALE ERECTILE DYSFUNCTION: ICD-10-CM

## 2021-08-31 DIAGNOSIS — E78.5 DYSLIPIDEMIA: ICD-10-CM

## 2021-08-31 DIAGNOSIS — R79.89 LOW TESTOSTERONE: ICD-10-CM

## 2021-08-31 DIAGNOSIS — E79.0 HYPERURICEMIA: ICD-10-CM

## 2021-08-31 RX ORDER — TADALAFIL 5 MG/1
TABLET ORAL
Qty: 90 TABLET | Refills: 0 | Status: SHIPPED | OUTPATIENT
Start: 2021-08-31 | End: 2022-04-26

## 2021-09-09 DIAGNOSIS — E11.9 TYPE 2 DIABETES MELLITUS WITHOUT COMPLICATION, WITHOUT LONG-TERM CURRENT USE OF INSULIN (HCC): ICD-10-CM

## 2021-09-09 DIAGNOSIS — R79.89 LOW TESTOSTERONE: ICD-10-CM

## 2021-09-09 DIAGNOSIS — E79.0 HYPERURICEMIA: ICD-10-CM

## 2021-09-09 DIAGNOSIS — R94.8 ABNORMAL RESULTS OF FUNCTION STUDIES OF OTHER ORGANS AND SYSTEMS: ICD-10-CM

## 2021-09-09 DIAGNOSIS — E55.9 VITAMIN D DEFICIENCY: Primary | ICD-10-CM

## 2021-09-09 DIAGNOSIS — I25.10 CORONARY ARTERY DISEASE INVOLVING NATIVE CORONARY ARTERY OF NATIVE HEART WITHOUT ANGINA PECTORIS: ICD-10-CM

## 2021-09-09 DIAGNOSIS — I10 ESSENTIAL HYPERTENSION: ICD-10-CM

## 2021-09-13 DIAGNOSIS — R79.89 LOW TESTOSTERONE: ICD-10-CM

## 2021-09-13 DIAGNOSIS — R73.9 HYPERGLYCEMIA: ICD-10-CM

## 2021-09-13 DIAGNOSIS — N52.8 OTHER MALE ERECTILE DYSFUNCTION: ICD-10-CM

## 2021-09-13 DIAGNOSIS — I10 ESSENTIAL HYPERTENSION: ICD-10-CM

## 2021-09-13 DIAGNOSIS — E55.9 VITAMIN D DEFICIENCY: ICD-10-CM

## 2021-09-13 DIAGNOSIS — E78.5 DYSLIPIDEMIA: ICD-10-CM

## 2021-09-13 DIAGNOSIS — E79.0 HYPERURICEMIA: ICD-10-CM

## 2021-09-13 RX ORDER — OLMESARTAN MEDOXOMIL, AMLODIPINE AND HYDROCHLOROTHIAZIDE TABLET 40/10/25 MG 40; 10; 25 MG/1; MG/1; MG/1
1 TABLET ORAL DAILY
Qty: 90 TABLET | Refills: 1 | Status: SHIPPED | OUTPATIENT
Start: 2021-09-13 | End: 2022-03-14

## 2021-09-24 LAB
ALBUMIN SERPL-MCNC: 4.7 G/DL (ref 3.5–5.2)
ALBUMIN/CREAT UR: 4 MG/G CREAT (ref 0–29)
ALBUMIN/GLOB SERPL: 2.5 G/DL
ALP SERPL-CCNC: 77 U/L (ref 39–117)
ALT SERPL-CCNC: 37 U/L (ref 1–41)
AST SERPL-CCNC: 30 U/L (ref 1–40)
BASOPHILS # BLD AUTO: 0.06 10*3/MM3 (ref 0–0.2)
BASOPHILS NFR BLD AUTO: 0.8 % (ref 0–1.5)
BILIRUB SERPL-MCNC: 0.9 MG/DL (ref 0–1.2)
BUN SERPL-MCNC: 38 MG/DL (ref 8–23)
BUN/CREAT SERPL: 20.4 (ref 7–25)
CALCIUM SERPL-MCNC: 9.5 MG/DL (ref 8.6–10.5)
CHLORIDE SERPL-SCNC: 98 MMOL/L (ref 98–107)
CHOLEST SERPL-MCNC: 218 MG/DL (ref 0–200)
CO2 SERPL-SCNC: 25 MMOL/L (ref 22–29)
CREAT SERPL-MCNC: 1.86 MG/DL (ref 0.76–1.27)
CREAT UR-MCNC: 182.5 MG/DL
EOSINOPHIL # BLD AUTO: 0.16 10*3/MM3 (ref 0–0.4)
EOSINOPHIL NFR BLD AUTO: 2.2 % (ref 0.3–6.2)
ERYTHROCYTE [DISTWIDTH] IN BLOOD BY AUTOMATED COUNT: 13.2 % (ref 12.3–15.4)
GLOBULIN SER CALC-MCNC: 1.9 GM/DL
GLUCOSE SERPL-MCNC: 134 MG/DL (ref 65–99)
HBA1C MFR BLD: 6.5 % (ref 4.8–5.6)
HCT VFR BLD AUTO: 46.4 % (ref 37.5–51)
HDLC SERPL-MCNC: 58 MG/DL (ref 40–60)
HGB BLD-MCNC: 15.4 G/DL (ref 13–17.7)
IMM GRANULOCYTES # BLD AUTO: 0.04 10*3/MM3 (ref 0–0.05)
IMM GRANULOCYTES NFR BLD AUTO: 0.6 % (ref 0–0.5)
IMP & REVIEW OF LAB RESULTS: NORMAL
LDLC SERPL CALC-MCNC: 131 MG/DL (ref 0–100)
LYMPHOCYTES # BLD AUTO: 1.54 10*3/MM3 (ref 0.7–3.1)
LYMPHOCYTES NFR BLD AUTO: 21.2 % (ref 19.6–45.3)
MCH RBC QN AUTO: 32.2 PG (ref 26.6–33)
MCHC RBC AUTO-ENTMCNC: 33.2 G/DL (ref 31.5–35.7)
MCV RBC AUTO: 96.9 FL (ref 79–97)
MICROALBUMIN UR-MCNC: 7.7 UG/ML
MONOCYTES # BLD AUTO: 0.68 10*3/MM3 (ref 0.1–0.9)
MONOCYTES NFR BLD AUTO: 9.4 % (ref 5–12)
NEUTROPHILS # BLD AUTO: 4.77 10*3/MM3 (ref 1.7–7)
NEUTROPHILS NFR BLD AUTO: 65.8 % (ref 42.7–76)
NRBC BLD AUTO-RTO: 0 /100 WBC (ref 0–0.2)
PLATELET # BLD AUTO: 200 10*3/MM3 (ref 140–450)
POTASSIUM SERPL-SCNC: 4.3 MMOL/L (ref 3.5–5.2)
PROT SERPL-MCNC: 6.6 G/DL (ref 6–8.5)
PSA SERPL-MCNC: 1.66 NG/ML (ref 0–4)
RBC # BLD AUTO: 4.79 10*6/MM3 (ref 4.14–5.8)
SODIUM SERPL-SCNC: 136 MMOL/L (ref 136–145)
TESTOST FREE MFR SERPL: 3.7 % (ref 1.5–4.2)
TESTOST FREE SERPL-MCNC: 19.24 NG/DL (ref 5–21)
TESTOST SERPL-MCNC: 520 NG/DL (ref 264–916)
TRIGL SERPL-MCNC: 165 MG/DL (ref 0–150)
URATE SERPL-MCNC: 7.4 MG/DL (ref 3.4–7)
VLDLC SERPL CALC-MCNC: 29 MG/DL (ref 5–40)
WBC # BLD AUTO: 7.25 10*3/MM3 (ref 3.4–10.8)

## 2021-10-05 ENCOUNTER — OFFICE VISIT (OUTPATIENT)
Dept: ENDOCRINOLOGY | Age: 81
End: 2021-10-05

## 2021-10-05 VITALS
SYSTOLIC BLOOD PRESSURE: 126 MMHG | BODY MASS INDEX: 26.28 KG/M2 | HEART RATE: 95 BPM | OXYGEN SATURATION: 98 % | WEIGHT: 183.6 LBS | DIASTOLIC BLOOD PRESSURE: 68 MMHG | HEIGHT: 70 IN

## 2021-10-05 DIAGNOSIS — R79.89 LOW TESTOSTERONE: ICD-10-CM

## 2021-10-05 DIAGNOSIS — E55.9 VITAMIN D DEFICIENCY: ICD-10-CM

## 2021-10-05 DIAGNOSIS — I10 ESSENTIAL HYPERTENSION: ICD-10-CM

## 2021-10-05 DIAGNOSIS — Z78.9 STATIN INTOLERANCE: ICD-10-CM

## 2021-10-05 DIAGNOSIS — I25.10 CORONARY ARTERY DISEASE INVOLVING NATIVE CORONARY ARTERY OF NATIVE HEART WITHOUT ANGINA PECTORIS: ICD-10-CM

## 2021-10-05 DIAGNOSIS — Z95.3 HISTORY OF AORTIC VALVE REPLACEMENT WITH PORCINE VALVE: ICD-10-CM

## 2021-10-05 DIAGNOSIS — E78.5 DYSLIPIDEMIA: ICD-10-CM

## 2021-10-05 DIAGNOSIS — E11.9 TYPE 2 DIABETES MELLITUS WITHOUT COMPLICATION, WITHOUT LONG-TERM CURRENT USE OF INSULIN (HCC): Primary | ICD-10-CM

## 2021-10-05 PROCEDURE — 99214 OFFICE O/P EST MOD 30 MIN: CPT | Performed by: INTERNAL MEDICINE

## 2021-10-05 RX ORDER — DIPHENHYDRAMINE HCL 50 MG
1 CAPSULE ORAL NIGHTLY
COMMUNITY

## 2021-10-19 DIAGNOSIS — R79.89 LOW TESTOSTERONE IN MALE: ICD-10-CM

## 2021-10-19 RX ORDER — TESTOSTERONE GEL, 1% 10 MG/G
GEL TRANSDERMAL
Qty: 300 G | OUTPATIENT
Start: 2021-10-19

## 2021-10-19 RX ORDER — TESTOSTERONE GEL, 1% 10 MG/G
GEL TRANSDERMAL
Qty: 300 G | Refills: 1 | Status: SHIPPED | OUTPATIENT
Start: 2021-10-19 | End: 2022-01-20

## 2021-11-08 DIAGNOSIS — R79.89 LOW TESTOSTERONE: ICD-10-CM

## 2021-11-08 DIAGNOSIS — E11.9 TYPE 2 DIABETES MELLITUS WITHOUT COMPLICATION, WITHOUT LONG-TERM CURRENT USE OF INSULIN (HCC): ICD-10-CM

## 2021-11-08 DIAGNOSIS — F52.21 ERECTILE DYSFUNCTION OF NONORGANIC ORIGIN: Primary | ICD-10-CM

## 2021-11-08 RX ORDER — FINASTERIDE 5 MG/1
5 TABLET, FILM COATED ORAL DAILY
Qty: 90 TABLET | Refills: 1 | Status: SHIPPED | OUTPATIENT
Start: 2021-11-08 | End: 2023-03-13

## 2022-01-16 DIAGNOSIS — N52.8 OTHER MALE ERECTILE DYSFUNCTION: ICD-10-CM

## 2022-01-16 DIAGNOSIS — I10 ESSENTIAL HYPERTENSION: ICD-10-CM

## 2022-01-16 DIAGNOSIS — R79.89 LOW TESTOSTERONE: ICD-10-CM

## 2022-01-16 DIAGNOSIS — E55.9 VITAMIN D DEFICIENCY: ICD-10-CM

## 2022-01-16 DIAGNOSIS — E79.0 HYPERURICEMIA: ICD-10-CM

## 2022-01-16 DIAGNOSIS — R73.9 HYPERGLYCEMIA: ICD-10-CM

## 2022-01-16 DIAGNOSIS — E78.5 DYSLIPIDEMIA: ICD-10-CM

## 2022-01-17 DIAGNOSIS — R79.89 LOW TESTOSTERONE IN MALE: ICD-10-CM

## 2022-01-18 RX ORDER — ERGOCALCIFEROL 1.25 MG/1
CAPSULE ORAL
Qty: 26 CAPSULE | Refills: 1 | Status: SHIPPED | OUTPATIENT
Start: 2022-01-18 | End: 2022-07-20

## 2022-01-20 RX ORDER — TESTOSTERONE GEL, 1% 10 MG/G
GEL TRANSDERMAL
Qty: 300 G | Refills: 1 | Status: SHIPPED | OUTPATIENT
Start: 2022-01-20 | End: 2022-06-15 | Stop reason: SDUPTHER

## 2022-03-12 DIAGNOSIS — N52.8 OTHER MALE ERECTILE DYSFUNCTION: ICD-10-CM

## 2022-03-12 DIAGNOSIS — E55.9 VITAMIN D DEFICIENCY: ICD-10-CM

## 2022-03-12 DIAGNOSIS — I10 ESSENTIAL HYPERTENSION: ICD-10-CM

## 2022-03-12 DIAGNOSIS — R73.9 HYPERGLYCEMIA: ICD-10-CM

## 2022-03-12 DIAGNOSIS — E78.5 DYSLIPIDEMIA: ICD-10-CM

## 2022-03-12 DIAGNOSIS — E79.0 HYPERURICEMIA: ICD-10-CM

## 2022-03-12 DIAGNOSIS — R79.89 LOW TESTOSTERONE: ICD-10-CM

## 2022-03-14 RX ORDER — OLMESARTAN MEDOXOMIL, AMLODIPINE AND HYDROCHLOROTHIAZIDE TABLET 40/10/25 MG 40; 10; 25 MG/1; MG/1; MG/1
1 TABLET ORAL DAILY
Qty: 90 TABLET | Refills: 0 | Status: SHIPPED | OUTPATIENT
Start: 2022-03-14 | End: 2022-05-02 | Stop reason: ALTCHOICE

## 2022-04-24 DIAGNOSIS — E55.9 VITAMIN D DEFICIENCY: ICD-10-CM

## 2022-04-24 DIAGNOSIS — R79.89 LOW TESTOSTERONE: ICD-10-CM

## 2022-04-24 DIAGNOSIS — I10 ESSENTIAL HYPERTENSION: ICD-10-CM

## 2022-04-24 DIAGNOSIS — R73.9 HYPERGLYCEMIA: ICD-10-CM

## 2022-04-24 DIAGNOSIS — E79.0 HYPERURICEMIA: ICD-10-CM

## 2022-04-24 DIAGNOSIS — E78.5 DYSLIPIDEMIA: ICD-10-CM

## 2022-04-24 DIAGNOSIS — N52.8 OTHER MALE ERECTILE DYSFUNCTION: ICD-10-CM

## 2022-04-26 ENCOUNTER — OFFICE VISIT (OUTPATIENT)
Dept: ENDOCRINOLOGY | Age: 82
End: 2022-04-26

## 2022-04-26 VITALS
HEART RATE: 90 BPM | OXYGEN SATURATION: 99 % | SYSTOLIC BLOOD PRESSURE: 102 MMHG | DIASTOLIC BLOOD PRESSURE: 54 MMHG | WEIGHT: 171 LBS | HEIGHT: 70 IN | BODY MASS INDEX: 24.48 KG/M2

## 2022-04-26 DIAGNOSIS — R79.89 ELEVATED LFTS: ICD-10-CM

## 2022-04-26 DIAGNOSIS — N18.32 TYPE 2 DIABETES MELLITUS WITH STAGE 3B CHRONIC KIDNEY DISEASE, WITHOUT LONG-TERM CURRENT USE OF INSULIN: Primary | ICD-10-CM

## 2022-04-26 DIAGNOSIS — N18.32 HYPERTENSIVE KIDNEY DISEASE WITH STAGE 3B CHRONIC KIDNEY DISEASE: ICD-10-CM

## 2022-04-26 DIAGNOSIS — E11.22 TYPE 2 DIABETES MELLITUS WITH STAGE 3B CHRONIC KIDNEY DISEASE, WITHOUT LONG-TERM CURRENT USE OF INSULIN: Primary | ICD-10-CM

## 2022-04-26 DIAGNOSIS — E87.5 HYPERKALEMIA: ICD-10-CM

## 2022-04-26 DIAGNOSIS — E83.52 HYPERCALCEMIA: ICD-10-CM

## 2022-04-26 DIAGNOSIS — Z11.59 ENCOUNTER FOR HEPATITIS C SCREENING TEST FOR LOW RISK PATIENT: ICD-10-CM

## 2022-04-26 DIAGNOSIS — E29.1 HYPOGONADISM IN MALE: ICD-10-CM

## 2022-04-26 DIAGNOSIS — E55.9 VITAMIN D DEFICIENCY: ICD-10-CM

## 2022-04-26 DIAGNOSIS — I12.9 HYPERTENSIVE KIDNEY DISEASE WITH STAGE 3B CHRONIC KIDNEY DISEASE: ICD-10-CM

## 2022-04-26 DIAGNOSIS — E78.5 DYSLIPIDEMIA: ICD-10-CM

## 2022-04-26 PROCEDURE — 99214 OFFICE O/P EST MOD 30 MIN: CPT | Performed by: NURSE PRACTITIONER

## 2022-04-26 RX ORDER — TADALAFIL 5 MG/1
TABLET ORAL
Qty: 90 TABLET | Refills: 0 | Status: SHIPPED | OUTPATIENT
Start: 2022-04-26 | End: 2022-07-28

## 2022-04-26 NOTE — PROGRESS NOTES
Chief Complaint  Chief Complaint   Patient presents with   • Hypogonadism   • Hyperlipidemia   • Vitamin D Deficiency   • Hypertension   • Diabetes     Prediabetic  / not testing bs / last eye exam 2 yrs ago        Subjective          History of Present Illness    Randy Mccann 81 y.o. presents for a follow-up evaluation for type 2 DM    He has been diabetic since 2015    Pt is on the following medications for their DM: NCS low fat diet    He went off Actos 15 mg and Tradjenta 5 mg in 2021    He has not used metformin in the past.      Pt complains of intermittent diarrhea    Denies constipation, chest pain, shortness of breath, vision changes, numbness and tingling in feet/hands.    Weight loss of 12 lbs since last visit.    Pt does not have a history of DM retinopathy.  Last eye exam was 2019    Pt does have a history of nephropathy.  Patient is/not currently taking ACE/ARB  Creatinine want from 1.86 to 2.67 with last labs in 03/22  eGFR went from 35 to 24 with last labs in 03/22  Calcium was elevated at 10.3    Pt does not have neuropathy.    Pt does have a history of CAD.  He had 1 vessel coronary artery bypass and porcine aortic valve replacement at [place] in 2018.  His cardiologist is Dr. Enrrique Ogden.    Last A1C in 03/22  was 7.2    Last microalbumin in 09/21 was negative          Blood Sugars    Blood glucoses are not checked        Hyperlipidemia     Pt denies any chest pain, or shortness of breath    Pt complains of muscle cramps    Pt is currently taking Zetia 10 mg daily.  He had muscle pain with Crestor in the past.  He has never used Lipitor or pravastatin    Last lipid panel in 03/22 showed Total 232, Triglycerides 254, HDL 56 and             Hypertension    Pt denies any chest pain, palpitations, shortness of breath, or headache    Current regimen includes Olmesartan- amlodipine - HCTZ 40-10-25 mg daily        Low Testosterone    He is  and has fathered 2 children.  He is not  "planning any more children.      Current treatment includes AndroGel 1% 1 tube on each arm    Last labs in 03/22 show total testosterone 403 and free testosterone 14.63    Last PSA in 03/21 was 0.8    Last H & H in 03/22 was 15.6 & 44.9            Vitamin D Deficiency    Current treatment includes 50,000 units twice a week - patient states that he has not been taking on regular basis- forgetting since is is twice a week    Last Vit D level was 56.5 in 07/20           Hypercalcemia    Calcium was 10.3 on last labs            Elevated LFTS    Last labs showed ALT of 87 and AST of 47.   Prior labs were normal            I have reviewed the patient's allergies, medicines, past medical hx, family hx and social hx.    Objective   Vital Signs:   /54   Pulse 90   Ht 177.8 cm (70\")   Wt 77.6 kg (171 lb)   SpO2 99%   BMI 24.54 kg/m²       Physical Exam   Physical Exam  Constitutional:       General: He is not in acute distress.     Appearance: Normal appearance. He is not diaphoretic.   HENT:      Head: Normocephalic and atraumatic.   Eyes:      General:         Right eye: No discharge.         Left eye: No discharge.   Skin:     General: Skin is warm and dry.   Neurological:      Mental Status: He is alert.   Psychiatric:         Mood and Affect: Mood normal.         Behavior: Behavior normal.                    Results Review:   Hemoglobin A1C   Date Value Ref Range Status   03/31/2022 7.2 (H) 4.8 - 5.6 % Final     Comment:              Prediabetes: 5.7 - 6.4           Diabetes: >6.4           Glycemic control for adults with diabetes: <7.0     08/17/2018 7.2 (H) 4.3 - 5.6 % Final     Comment:     (note)  A1C% Reference Range:  4.3 - 5.6  Normal range  5.7 - 6.4  Pre-diabetic -increased risk for developing diabetes mellitus.  >=6.5      Diabetic -diagnostic of diabetes mellitus.  Note: For diagnosis of diabetes in individuals without unequivocal   hyperglycemia, results should be confirmed by repeat testing. "    Patients with conditions that shorten erythrocyte survival, such as   recovery from acute blood loss, hemolytic anemia, kidney disease,   or the presence of unstable hemogloblins like HbSS, HbCC, and HbSC   may yield falsely decreased HbA1c test results. Iron deficiency may   yield falsely increased HbA1c test results.     Triglycerides   Date Value Ref Range Status   03/31/2022 254 (H) 0 - 149 mg/dL Final   01/14/2019 230 (H) <150 mg/dL Final     Comment:     Triglycerides Reference Ranges:  Borderline High: 150-199  High: 200-499  Very High: >500     HDL Cholesterol   Date Value Ref Range Status   03/31/2022 56 >39 mg/dL Final   01/14/2019 30 (L) >40 mg/dL Final     LDL Cholesterol    Date Value Ref Range Status   01/14/2019 102 (H) 0 - 100 mg/dL Final     Comment:       LDL Cholesterol Reference Range  <100     Optimal  100-129  Near optimal/above optimal  130-159  Borderline high  160-189  High  >=190    Very high     LDL Chol Calc (NIH)   Date Value Ref Range Status   03/31/2022 131 (H) 0 - 99 mg/dL Final     VLDL Cholesterol   Date Value Ref Range Status   01/14/2019 46 (H) 0 - 30 mg/dL Final     VLDL Cholesterol Juan Daniel   Date Value Ref Range Status   03/31/2022 45 (H) 5 - 40 mg/dL Final         Assessment and Plan {CC Problem List  Visit Diagnosis  ROS  Review (Popup)  Health Maintenance  Quality  BestPractice  Medications  SmartSets  SnapShot Encounters  Media :23  Diagnoses and all orders for this visit:    1. Type 2 diabetes mellitus with stage 3b chronic kidney disease, without long-term current use of insulin ([facility]) (Primary)  -     Comprehensive Metabolic Panel      A1C is up to 7.2%  He is off medication and want to try to work on diet and exercise  I will give him 3 months and if next time his A1c is up then we will start back on medication      2. Dyslipidemia    Total cholesterol, LDL and triglycerides are up.  I am not going to add anything at this time due to his LFTs being  elevated  Continue with zetia  Advised to decrease fat/cholesterol in diet      3. Hypogonadism in male    Testosterone levels are normal continue with AndroGel 1% 1 tube on each arm  CBC is normal  PSA is normal      4. Hypertensive kidney disease with stage 3b chronic kidney disease (HCC)    Stable  Continue with current medication regimen  If kidney function does not come back up the will refer to nephrology      5. Vitamin D deficiency  -     Vitamin D 25 Hydroxy    Check labs  Forgetting doses - if low then change to daily dosing is it is easier to remember      6. Elevated LFTs  -     Comprehensive Metabolic Panel  -     Hepatitis C antibody  -     Hepatitis A antibody, total  -     Hepatitis B core antibody, total  -     Hepatitis B surface antibody  -     Hepatitis B surface antigen    Pt states that he only drinks 2 shots a week  Takes 2 tylenol PM at night  Never been tested for hepatitis - will check  If still elevated advised that he will need to follow up with PCP, which he states that he hasn't seen in years, or I will send to GI  Check labs      7. Hypercalcemia  -     Comprehensive Metabolic Panel  -     Vitamin D 25 Hydroxy  -     Calcium, Ionized  -     PTH, Intact  -     PTH-related Peptide    Check labs today  Possible from low vit D since he hasn't been taking or HCTZ  Pt states that he has been getting muscle cramps and thought it was from low potassium or calcium and has been supplementing.  Advised to stop      8. Hyperkalemia  -     Comprehensive Metabolic Panel    Pt states that he has been getting muscle cramps and thought it was from low potassium or calcium and has been supplementing.  Advised to stop  Check labs        9. Encounter for hepatitis C screening test for low risk patient  -     Hepatitis C antibody  -     Hepatitis A antibody, total  -     Hepatitis B core antibody, total  -     Hepatitis B surface antibody  -     Hepatitis B surface antigen           No refills needed at  this time    Labs today  RTC in 3 months with me and 6 months with Dr. Pulido      Follow Up     Patient was given instructions and counseling regarding her condition or for health maintenance advice. Please see specific information pulled into the AVS if appropriate.              Susanna Mccurdy, PAPITO  04/26/22

## 2022-04-30 LAB
25(OH)D3+25(OH)D2 SERPL-MCNC: 54.2 NG/ML (ref 30–100)
ALBUMIN SERPL-MCNC: 5 G/DL (ref 3.7–4.7)
ALBUMIN/GLOB SERPL: 2.2 {RATIO} (ref 1.2–2.2)
ALP SERPL-CCNC: 67 IU/L (ref 44–121)
ALT SERPL-CCNC: 37 IU/L (ref 0–44)
AST SERPL-CCNC: 25 IU/L (ref 0–40)
BILIRUB SERPL-MCNC: 0.9 MG/DL (ref 0–1.2)
BUN SERPL-MCNC: 89 MG/DL (ref 8–27)
BUN/CREAT SERPL: 27 (ref 10–24)
CA-I SERPL ISE-MCNC: 5.2 MG/DL (ref 4.5–5.6)
CALCIUM SERPL-MCNC: 9.9 MG/DL (ref 8.6–10.2)
CHLORIDE SERPL-SCNC: 99 MMOL/L (ref 96–106)
CO2 SERPL-SCNC: 16 MMOL/L (ref 20–29)
CREAT SERPL-MCNC: 3.25 MG/DL (ref 0.76–1.27)
EGFRCR SERPLBLD CKD-EPI 2021: 19 ML/MIN/1.73
GLOBULIN SER CALC-MCNC: 2.3 G/DL (ref 1.5–4.5)
GLUCOSE SERPL-MCNC: 152 MG/DL (ref 65–99)
HAV AB SER QL IA: NEGATIVE
HBV CORE AB SERPL QL IA: NEGATIVE
HBV SURFACE AB SER QL: NON REACTIVE
HBV SURFACE AG SERPL QL IA: NEGATIVE
HCV AB S/CO SERPL IA: <0.1 S/CO RATIO (ref 0–0.9)
POTASSIUM SERPL-SCNC: 5.8 MMOL/L (ref 3.5–5.2)
PROT SERPL-MCNC: 7.3 G/DL (ref 6–8.5)
PTH RELATED PROT SERPL-SCNC: <2 PMOL/L
PTH-INTACT SERPL-MCNC: 49 PG/ML (ref 15–65)
REPORT: NORMAL
SODIUM SERPL-SCNC: 134 MMOL/L (ref 134–144)

## 2022-05-02 DIAGNOSIS — E87.5 HYPERKALEMIA: ICD-10-CM

## 2022-05-02 DIAGNOSIS — N18.4 CHRONIC KIDNEY DISEASE, STAGE IV (SEVERE): ICD-10-CM

## 2022-05-02 DIAGNOSIS — N18.32 HYPERTENSIVE KIDNEY DISEASE WITH STAGE 3B CHRONIC KIDNEY DISEASE: Primary | ICD-10-CM

## 2022-05-02 DIAGNOSIS — I12.9 HYPERTENSIVE KIDNEY DISEASE WITH STAGE 3B CHRONIC KIDNEY DISEASE: Primary | ICD-10-CM

## 2022-05-02 RX ORDER — AMLODIPINE BESYLATE 10 MG/1
10 TABLET ORAL DAILY
Qty: 90 TABLET | Refills: 1 | Status: SHIPPED | OUTPATIENT
Start: 2022-05-02 | End: 2022-07-22

## 2022-06-13 DIAGNOSIS — E79.0 HYPERURICEMIA: ICD-10-CM

## 2022-06-13 DIAGNOSIS — R73.9 HYPERGLYCEMIA: ICD-10-CM

## 2022-06-13 DIAGNOSIS — E78.5 DYSLIPIDEMIA: ICD-10-CM

## 2022-06-13 DIAGNOSIS — E55.9 VITAMIN D DEFICIENCY: ICD-10-CM

## 2022-06-13 DIAGNOSIS — R79.89 LOW TESTOSTERONE: ICD-10-CM

## 2022-06-13 DIAGNOSIS — N52.8 OTHER MALE ERECTILE DYSFUNCTION: ICD-10-CM

## 2022-06-13 DIAGNOSIS — I10 ESSENTIAL HYPERTENSION: ICD-10-CM

## 2022-06-13 RX ORDER — OLMESARTAN MEDOXOMIL, AMLODIPINE AND HYDROCHLOROTHIAZIDE TABLET 40/10/25 MG 40; 10; 25 MG/1; MG/1; MG/1
1 TABLET ORAL DAILY
Qty: 90 TABLET | Refills: 0 | OUTPATIENT
Start: 2022-06-13

## 2022-06-15 DIAGNOSIS — R79.89 LOW TESTOSTERONE IN MALE: ICD-10-CM

## 2022-06-15 RX ORDER — TESTOSTERONE GEL, 1% 10 MG/G
GEL TRANSDERMAL
Qty: 300 G | Refills: 1 | Status: SHIPPED | OUTPATIENT
Start: 2022-06-15 | End: 2022-09-06

## 2022-06-23 ENCOUNTER — PRIOR AUTHORIZATION (OUTPATIENT)
Dept: ENDOCRINOLOGY | Age: 82
End: 2022-06-23

## 2022-06-23 ENCOUNTER — TELEPHONE (OUTPATIENT)
Dept: ENDOCRINOLOGY | Age: 82
End: 2022-06-23

## 2022-06-29 ENCOUNTER — TELEPHONE (OUTPATIENT)
Dept: ENDOCRINOLOGY | Age: 82
End: 2022-06-29

## 2022-07-19 NOTE — PROGRESS NOTES
Chief Complaint  Chief Complaint   Patient presents with   • Prediabetes     Patient doesn't use a meter to check his bs        Subjective           History of Present Illness    Randy Mccann 82 y.o.  presents for a follow-up evaluation for type 2 DM     He has been diabetic since 2015     Pt is on the following medications for their DM: NCS low fat diet and weight loss     He went off Actos 15 mg and Tradjenta 5 mg in 2021    He has not used metformin in the past.        Denies diarrhea, constipation, chest pain, shortness of breath, vision changes, numbness and tingling in feet/hands.    Weight loss of 10 lbs since last visit. - he changed his diet to help loss weight.    Pt does not have a history of DM retinopathy.  Last eye exam was 2019     Pt does have a history of nephropathy.  Patient is not currently taking ACE/ARB  Creatinine want from 1.86 to 2.67 with last labs in 03/22.  Most recent labs showed Creatinine of 3.25  eGFR went from 35 to 24 with last labs in 03/22.  Most recent labs showed eGFR of 19.  Referral to Nephrology was made - Pt never went to nephrology.    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  He has a hard time finding people to watch them when he has doctor appointments, which is why he never followed up with Nephrology.       Pt does not have neuropathy.     Pt does have a history of CAD.  He had 1 vessel coronary artery bypass and porcine aortic valve replacement at [place] in 2018.  His cardiologist is Dr. Enrrique Ogden.    Last A1C in 03/22 was 7.2    Last microalbumin in 09/21 was negative          Blood Sugars    Blood glucoses are not checked               Hyperlipidemia     Pt denies any muscle/body aches, chest pain, or shortness of breath    Pt is currently taking Zetia 10 mg daily.  He had muscle pain with Crestor in the past.  He has never used Lipitor or pravastatin    Last lipid panel in 03/22 showed Total 232, Triglycerides  "254, HDL 56 and             Hypertension with CKD Stage 4    Pt denies any chest pain, palpitations, shortness of breath, or headache    Current regimen includes nothing  Stopped on his own amlodipine 10 mg daily    Olmesartan- amlodipine - HCTZ 40-10-25 mg daily was stopped due to kidney function and elevated potassium    Home -120/60  HR 80s          Low Testosterone    He is  and has fathered 2 children.  He is not planning any more children.    Current treatment includes  AndroGel 1% 1 tube on each arm    Last labs in 03/22 show total testosterone 403 and free testosterone 14.63    Last PSA on 03/31/22 was 0.8    Last H & H in 03/22 was 15.6 & 44.9              Vitamin D Deficiency    Current treatment includes 50,000 units twice a week    Last Vit D level was 54.2 in 04/22             Hypercalcemia     Calcium was 10.3 on last labs    Last calcium was normal at 9.9 in 04/22          Elevated LFTS     Last labs showed ALT of 87 and AST of 47.   Prior labs were normal    Recheck showed normal ALT at 37 and AST at 25          I have reviewed the patient's allergies, medicines, past medical hx, family hx and social hx.    Objective   Vital Signs:   /80   Pulse 69   Temp 99.6 °F (37.6 °C) (Temporal)   Ht 177.8 cm (70\")   Wt 73.1 kg (161 lb 3.2 oz)   SpO2 95%   BMI 23.13 kg/m²       Physical Exam   Physical Exam  Constitutional:       General: He is not in acute distress.     Appearance: Normal appearance. He is not diaphoretic.   HENT:      Head: Normocephalic and atraumatic.   Eyes:      General:         Right eye: No discharge.         Left eye: No discharge.   Skin:     General: Skin is warm and dry.   Neurological:      Mental Status: He is alert.   Psychiatric:         Mood and Affect: Mood normal.         Behavior: Behavior normal.                    Results Review:   Hemoglobin A1C   Date Value Ref Range Status   03/31/2022 7.2 (H) 4.8 - 5.6 % Final     Comment:              " Prediabetes: 5.7 - 6.4           Diabetes: >6.4           Glycemic control for adults with diabetes: <7.0     08/17/2018 7.2 (H) 4.3 - 5.6 % Final     Comment:     (note)  A1C% Reference Range:  4.3 - 5.6  Normal range  5.7 - 6.4  Pre-diabetic -increased risk for developing diabetes mellitus.  >=6.5      Diabetic -diagnostic of diabetes mellitus.  Note: For diagnosis of diabetes in individuals without unequivocal   hyperglycemia, results should be confirmed by repeat testing.    Patients with conditions that shorten erythrocyte survival, such as   recovery from acute blood loss, hemolytic anemia, kidney disease,   or the presence of unstable hemogloblins like HbSS, HbCC, and HbSC   may yield falsely decreased HbA1c test results. Iron deficiency may   yield falsely increased HbA1c test results.     Triglycerides   Date Value Ref Range Status   03/31/2022 254 (H) 0 - 149 mg/dL Final   01/14/2019 230 (H) <150 mg/dL Final     Comment:     Triglycerides Reference Ranges:  Borderline High: 150-199  High: 200-499  Very High: >500     HDL Cholesterol   Date Value Ref Range Status   03/31/2022 56 >39 mg/dL Final   01/14/2019 30 (L) >40 mg/dL Final     LDL Cholesterol    Date Value Ref Range Status   01/14/2019 102 (H) 0 - 100 mg/dL Final     Comment:       LDL Cholesterol Reference Range  <100     Optimal  100-129  Near optimal/above optimal  130-159  Borderline high  160-189  High  >=190    Very high     LDL Chol Calc (NIH)   Date Value Ref Range Status   03/31/2022 131 (H) 0 - 99 mg/dL Final     VLDL Cholesterol   Date Value Ref Range Status   01/14/2019 46 (H) 0 - 30 mg/dL Final     VLDL Cholesterol Juan Daniel   Date Value Ref Range Status   03/31/2022 45 (H) 5 - 40 mg/dL Final         Assessment and Plan {CC Problem List  Visit Diagnosis  ROS  Review (Popup)  Health Maintenance  Quality  BestPractice  Medications  SmartSets  SnapShot Encounters  Media :23  Diagnoses and all orders for this visit:    1. Type 2  diabetes mellitus with stage 4 chronic kidney disease, without long-term current use of insulin (HCC) (Primary)  -     CBC Auto Differential  -     Hemoglobin A1c  -     Comprehensive Metabolic Panel  -     Microalbumin / Creatinine Urine Ratio - Urine, Clean Catch    Check labs today  I gave pt 3 months to work on diet, exercise and weight loss.  If A1C is still above 7 or higher then we will start back on medication.  Continue with weight loss, exercise and dietary changes  I reviewed kidney function with patient and showed how it has progressively gotten worse and why that is concerning.  Pt states that he has a hard time finding someone to watch the people that he cares for which is why he never followed up with Nephrology.  I advised that he needs to see Nephrology and gave him the phone number to call and make an appointment.      2. Dyslipidemia  -     Comprehensive Metabolic Panel  -     Lipid Panel    Check labs today  Continue with zetia      3. Benign hypertension with CKD (chronic kidney disease) stage IV (HCC)  -     Comprehensive Metabolic Panel    Pt took himself off norvasc  BP today is good and patient monitors BP at home and states that they run 110s-120s/60s.  Continue off medication and continue to monitor      4. Hypogonadism in male  -     CBC Auto Differential  -     Testosterone, Free, Total    Check labs today  Continue with AndroGel 1% 1 tube on each arm      5. Vitamin D deficiency    Continue with supplement      6. Hyperkalemia  -     Comprehensive Metabolic Panel       Recheck labs today  Needs to follow up with Nephrology - number given            Labs today  RTC on 10/25/22 with Dr. Pulido      Follow Up     Patient was given instructions and counseling regarding her condition or for health maintenance advice. Please see specific information pulled into the AVS if appropriate.              Susanna Mccurdy, PAPITO  07/22/22

## 2022-07-20 DIAGNOSIS — E78.5 DYSLIPIDEMIA: ICD-10-CM

## 2022-07-20 DIAGNOSIS — E55.9 VITAMIN D DEFICIENCY: ICD-10-CM

## 2022-07-20 DIAGNOSIS — I10 ESSENTIAL HYPERTENSION: ICD-10-CM

## 2022-07-20 DIAGNOSIS — R73.9 HYPERGLYCEMIA: ICD-10-CM

## 2022-07-20 DIAGNOSIS — R79.89 LOW TESTOSTERONE: ICD-10-CM

## 2022-07-20 DIAGNOSIS — N52.8 OTHER MALE ERECTILE DYSFUNCTION: ICD-10-CM

## 2022-07-20 DIAGNOSIS — E79.0 HYPERURICEMIA: ICD-10-CM

## 2022-07-20 RX ORDER — ERGOCALCIFEROL 1.25 MG/1
CAPSULE ORAL
Qty: 26 CAPSULE | Refills: 1 | Status: SHIPPED | OUTPATIENT
Start: 2022-07-20

## 2022-07-22 ENCOUNTER — OFFICE VISIT (OUTPATIENT)
Dept: ENDOCRINOLOGY | Age: 82
End: 2022-07-22

## 2022-07-22 VITALS
HEART RATE: 69 BPM | TEMPERATURE: 99.6 F | BODY MASS INDEX: 23.08 KG/M2 | SYSTOLIC BLOOD PRESSURE: 126 MMHG | WEIGHT: 161.2 LBS | HEIGHT: 70 IN | DIASTOLIC BLOOD PRESSURE: 80 MMHG | OXYGEN SATURATION: 95 %

## 2022-07-22 DIAGNOSIS — E29.1 HYPOGONADISM IN MALE: ICD-10-CM

## 2022-07-22 DIAGNOSIS — E55.9 VITAMIN D DEFICIENCY: ICD-10-CM

## 2022-07-22 DIAGNOSIS — E87.5 HYPERKALEMIA: ICD-10-CM

## 2022-07-22 DIAGNOSIS — N18.4 TYPE 2 DIABETES MELLITUS WITH STAGE 4 CHRONIC KIDNEY DISEASE, WITHOUT LONG-TERM CURRENT USE OF INSULIN: Primary | ICD-10-CM

## 2022-07-22 DIAGNOSIS — E78.5 DYSLIPIDEMIA: ICD-10-CM

## 2022-07-22 DIAGNOSIS — E11.22 TYPE 2 DIABETES MELLITUS WITH STAGE 4 CHRONIC KIDNEY DISEASE, WITHOUT LONG-TERM CURRENT USE OF INSULIN: Primary | ICD-10-CM

## 2022-07-22 DIAGNOSIS — I12.9 BENIGN HYPERTENSION WITH CKD (CHRONIC KIDNEY DISEASE) STAGE IV: ICD-10-CM

## 2022-07-22 DIAGNOSIS — N18.4 BENIGN HYPERTENSION WITH CKD (CHRONIC KIDNEY DISEASE) STAGE IV: ICD-10-CM

## 2022-07-22 PROCEDURE — 99214 OFFICE O/P EST MOD 30 MIN: CPT | Performed by: NURSE PRACTITIONER

## 2022-07-22 NOTE — PATIENT INSTRUCTIONS
[facility]  phone number: [phone].  You need to call and make an appointment as soon as possible.

## 2022-07-26 DIAGNOSIS — E78.5 DYSLIPIDEMIA: ICD-10-CM

## 2022-07-26 DIAGNOSIS — R73.9 HYPERGLYCEMIA: ICD-10-CM

## 2022-07-26 DIAGNOSIS — E11.22 TYPE 2 DIABETES MELLITUS WITH STAGE 4 CHRONIC KIDNEY DISEASE, WITHOUT LONG-TERM CURRENT USE OF INSULIN: Primary | ICD-10-CM

## 2022-07-26 DIAGNOSIS — E55.9 VITAMIN D DEFICIENCY: ICD-10-CM

## 2022-07-26 DIAGNOSIS — N18.4 TYPE 2 DIABETES MELLITUS WITH STAGE 4 CHRONIC KIDNEY DISEASE, WITHOUT LONG-TERM CURRENT USE OF INSULIN: Primary | ICD-10-CM

## 2022-07-26 DIAGNOSIS — E79.0 HYPERURICEMIA: ICD-10-CM

## 2022-07-26 DIAGNOSIS — I10 ESSENTIAL HYPERTENSION: ICD-10-CM

## 2022-07-26 DIAGNOSIS — N52.8 OTHER MALE ERECTILE DYSFUNCTION: ICD-10-CM

## 2022-07-26 DIAGNOSIS — R79.89 LOW TESTOSTERONE: ICD-10-CM

## 2022-07-27 LAB
ALBUMIN SERPL-MCNC: 4.5 G/DL (ref 3.7–4.7)
ALBUMIN/CREAT UR: <3 MG/G CREAT (ref 0–29)
ALBUMIN/GLOB SERPL: 2.1 {RATIO} (ref 1.2–2.2)
ALP SERPL-CCNC: 67 IU/L (ref 44–121)
ALT SERPL-CCNC: 27 IU/L (ref 0–44)
AST SERPL-CCNC: 29 IU/L (ref 0–40)
BASOPHILS # BLD AUTO: 0.1 X10E3/UL (ref 0–0.2)
BASOPHILS NFR BLD AUTO: 1 %
BILIRUB SERPL-MCNC: 1.2 MG/DL (ref 0–1.2)
BUN SERPL-MCNC: 24 MG/DL (ref 8–27)
BUN/CREAT SERPL: 15 (ref 10–24)
CALCIUM SERPL-MCNC: 9.5 MG/DL (ref 8.6–10.2)
CHLORIDE SERPL-SCNC: 103 MMOL/L (ref 96–106)
CHOLEST SERPL-MCNC: 159 MG/DL (ref 100–199)
CO2 SERPL-SCNC: 17 MMOL/L (ref 20–29)
CREAT SERPL-MCNC: 1.65 MG/DL (ref 0.76–1.27)
CREAT UR-MCNC: 97.6 MG/DL
EGFRCR SERPLBLD CKD-EPI 2021: 42 ML/MIN/1.73
EOSINOPHIL # BLD AUTO: 0.1 X10E3/UL (ref 0–0.4)
EOSINOPHIL NFR BLD AUTO: 1 %
ERYTHROCYTE [DISTWIDTH] IN BLOOD BY AUTOMATED COUNT: 13.3 % (ref 11.6–15.4)
GLOBULIN SER CALC-MCNC: 2.1 G/DL (ref 1.5–4.5)
GLUCOSE SERPL-MCNC: 89 MG/DL (ref 65–99)
HBA1C MFR BLD: 5.5 % (ref 4.8–5.6)
HCT VFR BLD AUTO: 34.2 % (ref 37.5–51)
HDLC SERPL-MCNC: 39 MG/DL
HGB BLD-MCNC: 11.5 G/DL (ref 13–17.7)
IMM GRANULOCYTES # BLD AUTO: 0 X10E3/UL (ref 0–0.1)
IMM GRANULOCYTES NFR BLD AUTO: 0 %
IMP & REVIEW OF LAB RESULTS: NORMAL
LDLC SERPL CALC-MCNC: 99 MG/DL (ref 0–99)
LYMPHOCYTES # BLD AUTO: 1 X10E3/UL (ref 0.7–3.1)
LYMPHOCYTES NFR BLD AUTO: 17 %
MCH RBC QN AUTO: 33.3 PG (ref 26.6–33)
MCHC RBC AUTO-ENTMCNC: 33.6 G/DL (ref 31.5–35.7)
MCV RBC AUTO: 99 FL (ref 79–97)
MICROALBUMIN UR-MCNC: <3 UG/ML
MONOCYTES # BLD AUTO: 0.4 X10E3/UL (ref 0.1–0.9)
MONOCYTES NFR BLD AUTO: 7 %
NEUTROPHILS # BLD AUTO: 4.3 X10E3/UL (ref 1.4–7)
NEUTROPHILS NFR BLD AUTO: 74 %
PLATELET # BLD AUTO: 215 X10E3/UL (ref 150–450)
POTASSIUM SERPL-SCNC: 4.6 MMOL/L (ref 3.5–5.2)
PROT SERPL-MCNC: 6.6 G/DL (ref 6–8.5)
RBC # BLD AUTO: 3.45 X10E6/UL (ref 4.14–5.8)
REPORT: NORMAL
SODIUM SERPL-SCNC: 137 MMOL/L (ref 134–144)
TESTOST FREE SERPL-MCNC: 9.3 PG/ML (ref 6.6–18.1)
TESTOST SERPL-MCNC: 782 NG/DL (ref 264–916)
TRIGL SERPL-MCNC: 115 MG/DL (ref 0–149)
VLDLC SERPL CALC-MCNC: 21 MG/DL (ref 5–40)
WBC # BLD AUTO: 5.8 X10E3/UL (ref 3.4–10.8)

## 2022-07-28 RX ORDER — TADALAFIL 5 MG/1
5 TABLET ORAL DAILY PRN
Qty: 90 TABLET | Refills: 0 | Status: SHIPPED | OUTPATIENT
Start: 2022-07-28 | End: 2022-08-29 | Stop reason: SDUPTHER

## 2022-07-29 ENCOUNTER — TELEPHONE (OUTPATIENT)
Dept: ENDOCRINOLOGY | Age: 82
End: 2022-07-29

## 2022-08-02 ENCOUNTER — TELEPHONE (OUTPATIENT)
Dept: ENDOCRINOLOGY | Age: 82
End: 2022-08-02

## 2022-08-23 ENCOUNTER — TELEPHONE (OUTPATIENT)
Dept: ENDOCRINOLOGY | Age: 82
End: 2022-08-23

## 2022-08-23 NOTE — TELEPHONE ENCOUNTER
Please notify patient his insurance will not cover tadalafil 5 mg.  He can get 30 tablets with a GoodRx card for $8-$12 at [facility] or [facility] without insurance.  It will cost $30 at [facility].  Ask patient if he wants us to send a prescription for 90 days to any of these pharmacies

## 2022-08-29 DIAGNOSIS — N18.4 TYPE 2 DIABETES MELLITUS WITH STAGE 4 CHRONIC KIDNEY DISEASE, WITHOUT LONG-TERM CURRENT USE OF INSULIN: ICD-10-CM

## 2022-08-29 DIAGNOSIS — E78.5 DYSLIPIDEMIA: ICD-10-CM

## 2022-08-29 DIAGNOSIS — N52.8 OTHER MALE ERECTILE DYSFUNCTION: ICD-10-CM

## 2022-08-29 DIAGNOSIS — E79.0 HYPERURICEMIA: ICD-10-CM

## 2022-08-29 DIAGNOSIS — I10 ESSENTIAL HYPERTENSION: ICD-10-CM

## 2022-08-29 DIAGNOSIS — E11.22 TYPE 2 DIABETES MELLITUS WITH STAGE 4 CHRONIC KIDNEY DISEASE, WITHOUT LONG-TERM CURRENT USE OF INSULIN: ICD-10-CM

## 2022-08-29 DIAGNOSIS — E55.9 VITAMIN D DEFICIENCY: ICD-10-CM

## 2022-08-29 DIAGNOSIS — R73.9 HYPERGLYCEMIA: ICD-10-CM

## 2022-08-29 DIAGNOSIS — R79.89 LOW TESTOSTERONE: ICD-10-CM

## 2022-08-29 RX ORDER — TADALAFIL 5 MG/1
5 TABLET ORAL DAILY PRN
Qty: 30 TABLET | Refills: 0 | Status: SHIPPED | OUTPATIENT
Start: 2022-08-29 | End: 2023-01-11

## 2022-09-05 DIAGNOSIS — R79.89 LOW TESTOSTERONE IN MALE: ICD-10-CM

## 2022-09-06 RX ORDER — TESTOSTERONE GEL, 1% 10 MG/G
GEL TRANSDERMAL
Qty: 300 G | Refills: 0 | Status: SHIPPED | OUTPATIENT
Start: 2022-09-06 | End: 2022-11-01

## 2022-10-13 ENCOUNTER — PRIOR AUTHORIZATION (OUTPATIENT)
Dept: ENDOCRINOLOGY | Age: 82
End: 2022-10-13

## 2022-10-24 NOTE — PROGRESS NOTES
Subjective   Randy Mccann is a 82 y.o. male.     History of Present Illness  F/u  for dm 2, hypertension, obesity, vitamin d def, hypogonadism, hyperplasia, ED/ pt doesn't test bs  / last dm eye exam over 2 years  / last dm foot exam 4/26/22           He has known diabetes mellitus since 2015.  He went off Actos 15 mg and Tradjenta 5 mg when he changed his diet in 6/22.  He has not used metformin in the past.  He does not check his blood sugars at home.      His last eye examination was in 9/22.  He has no retinopathy.  He denies blurry vision.  He has azotemia with serum creatinine ranging from 1.46-3.25 since 2021.  He did not schedule an appointment with nephrology.  Urine microalbumin done in 7/22 is normal.  He denies numbness, tingling or burning in his hands or feet.     He has hypertension and coronary artery disease.  He had 1 vessel coronary artery bypass and porcine aortic valve replacement at [place] in 2018.  He went off Tribenzor after he lost weight.  He denies history of heart attack or stroke.  He denies chest pain or shortness of breath.  His cardiologist is Dr. Enrrique Ogden.      He has vitamin D deficiency and is on ergocalciferol 50,000 units twice a week.  He has occasional diarrhea.  He has no history of celiac disease.     He has low testosterone and has been on AndroGel 1% 1 tube on each arm for several years.  He last used it yesterday.  He does not know the cause of the low testosterone.  He is  and has fathered 2 children.  He is not planning any more children.     He denies headaches, heat or cold intolerance, changes in shoes or ring size, easy bruising, or muscle weakness.     He has history of enlarged prostate and is on Cialis 5 mg/day and finasteride 5 mg once a day.  PSA done in September 2021 is normal at 1.66 ng/mL.  He denies difficulty initiating urination, dysuria,  hesitancy, or retention.     He has hyperlipidemia and is on Zetia 10 mg/day.  He had muscle pain  "with Crestor in the past.  He has never used Lipitor or pravastatin.     He has not the flu vac this fall.  He had 3 Pfizer Covid vaccine.   He has no history of Agent Orange exposure.     The following portions of the patient's history were reviewed and updated as appropriate: allergies, current medications, past family history, past medical history, past social history, past surgical history and problem list.    Review of Systems   Eyes: Negative for visual disturbance.   Respiratory: Negative for shortness of breath.    Cardiovascular: Negative for chest pain and palpitations.   Gastrointestinal: Negative.    Endocrine: Negative for cold intolerance and heat intolerance.   Genitourinary: Negative for difficulty urinating.   Musculoskeletal: Negative for myalgias.   Neurological: Negative for numbness.     Vitals:    10/25/22 1346   BP: 124/70   Pulse: 70   Temp: 97.6 °F (36.4 °C)   TempSrc: Temporal   SpO2: 97%   Weight: 70.9 kg (156 lb 3.2 oz)   Height: 177.8 cm (70\")      Objective   Physical Exam  Constitutional:       Appearance: Normal appearance.   Eyes:      General: No scleral icterus.        Right eye: No discharge.         Left eye: No discharge.      Extraocular Movements: Extraocular movements intact.   Neck:      Vascular: No carotid bruit.   Cardiovascular:      Rate and Rhythm: Normal rate and regular rhythm.      Pulses: Normal pulses.      Heart sounds: Normal heart sounds. No murmur heard.    No friction rub.   Pulmonary:      Effort: Pulmonary effort is normal.      Breath sounds: Normal breath sounds. No rales.   Chest:      Chest wall: No tenderness.   Abdominal:      General: Bowel sounds are normal.      Palpations: Abdomen is soft.      Tenderness: There is no right CVA tenderness or left CVA tenderness.   Lymphadenopathy:      Cervical: No cervical adenopathy.   Skin:     General: Skin is warm and dry.   Neurological:      General: No focal deficit present.      Mental Status: He is alert " and oriented to person, place, and time.   Psychiatric:         Mood and Affect: Mood normal.         Behavior: Behavior normal.       Office Visit on 07/22/2022   Component Date Value Ref Range Status   • Hemoglobin A1C 07/22/2022 5.5  4.8 - 5.6 % Final    Comment:          Prediabetes: 5.7 - 6.4           Diabetes: >6.4           Glycemic control for adults with diabetes: <7.0     • Glucose 07/22/2022 89  65 - 99 mg/dL Final   • BUN 07/22/2022 24  8 - 27 mg/dL Final   • Creatinine 07/22/2022 1.65 (H)  0.76 - 1.27 mg/dL Final   • EGFR Result 07/22/2022 42 (L)  >59 mL/min/1.73 Final   • BUN/Creatinine Ratio 07/22/2022 15  10 - 24 Final   • Sodium 07/22/2022 137  134 - 144 mmol/L Final   • Potassium 07/22/2022 4.6  3.5 - 5.2 mmol/L Final   • Chloride 07/22/2022 103  96 - 106 mmol/L Final   • Total CO2 07/22/2022 17 (L)  20 - 29 mmol/L Final   • Calcium 07/22/2022 9.5  8.6 - 10.2 mg/dL Final   • Total Protein 07/22/2022 6.6  6.0 - 8.5 g/dL Final   • Albumin 07/22/2022 4.5  3.7 - 4.7 g/dL Final   • Globulin 07/22/2022 2.1  1.5 - 4.5 g/dL Final   • A/G Ratio 07/22/2022 2.1  1.2 - 2.2 Final   • Total Bilirubin 07/22/2022 1.2  0.0 - 1.2 mg/dL Final   • Alkaline Phosphatase 07/22/2022 67  44 - 121 IU/L Final   • AST (SGOT) 07/22/2022 29  0 - 40 IU/L Final   • ALT (SGPT) 07/22/2022 27  0 - 44 IU/L Final   • Creatinine, Urine 07/22/2022 97.6  Not Estab. mg/dL Final   • Microalbumin, Urine 07/22/2022 <3.0  Not Estab. ug/mL Final   • Microalbumin/Creatinine Ratio 07/22/2022 <3  0 - 29 mg/g creat Final    Comment:                        Normal:                0 -  29                         Moderately increased: 30 - 300                         Severely increased:       >300     • Total Cholesterol 07/22/2022 159  100 - 199 mg/dL Final   • Triglycerides 07/22/2022 115  0 - 149 mg/dL Final   • HDL Cholesterol 07/22/2022 39 (L)  >39 mg/dL Final   • VLDL Cholesterol Juan Daniel 07/22/2022 21  5 - 40 mg/dL Final   • LDL Chol Calc ([facility])  07/22/2022 99  0 - 99 mg/dL Final   • Testosterone, Total 07/22/2022 782  264 - 916 ng/dL Final    Comment: Adult male reference interval is based on a population of  healthy nonobese males (BMI <30) between 19 and 39 years old.  Kemal et.al. JCEM 2017,102;2180-8207. PMID: [id number].     • Testosterone, Free 07/22/2022 9.3  6.6 - 18.1 pg/mL Final   • WBC 07/22/2022 5.8  3.4 - 10.8 x10E3/uL Final   • RBC 07/22/2022 3.45 (L)  4.14 - 5.80 x10E6/uL Final   • Hemoglobin 07/22/2022 11.5 (L)  13.0 - 17.7 g/dL Final   • Hematocrit 07/22/2022 34.2 (L)  37.5 - 51.0 % Final   • MCV 07/22/2022 99 (H)  79 - 97 fL Final   • MCH 07/22/2022 33.3 (H)  26.6 - 33.0 pg Final   • MCHC 07/22/2022 33.6  31.5 - 35.7 g/dL Final   • RDW 07/22/2022 13.3  11.6 - 15.4 % Final   • Platelets 07/22/2022 215  150 - 450 x10E3/uL Final   • Neutrophil Rel % 07/22/2022 74  Not Estab. % Final   • Lymphocyte Rel % 07/22/2022 17  Not Estab. % Final   • Monocyte Rel % 07/22/2022 7  Not Estab. % Final   • Eosinophil Rel % 07/22/2022 1  Not Estab. % Final   • Basophil Rel % 07/22/2022 1  Not Estab. % Final   • Neutrophils Absolute 07/22/2022 4.3  1.4 - 7.0 x10E3/uL Final   • Lymphocytes Absolute 07/22/2022 1.0  0.7 - 3.1 x10E3/uL Final   • Monocytes Absolute 07/22/2022 0.4  0.1 - 0.9 x10E3/uL Final   • Eosinophils Absolute 07/22/2022 0.1  0.0 - 0.4 x10E3/uL Final   • Basophils Absolute 07/22/2022 0.1  0.0 - 0.2 x10E3/uL Final   • Immature Granulocyte Rel % 07/22/2022 0  Not Estab. % Final   • Immature Grans Absolute 07/22/2022 0.0  0.0 - 0.1 x10E3/uL Final   • Interpretation 07/22/2022 Note   Final    Comment: -------------------------------  CHRONIC KIDNEY DISEASE:  EGFR, BLOOD PRESSURE, AND PROTEINURIA ASSESSMENT  The overall regression of eGFR with time is not  statistically significant. Current eGFR is 42 mL/min/1.73mE2  corresponding to CKD stage 3b. Potassium is within goal and  has decreased, was 5.8 and now is 4.6 mmol/L. Urine albumin  is  undetectable. Glycemic control (HB A1c: 5.5 %) is tight.  Consider targeting Hb A1C near 7% if patient is at risk for  hypoglycemia.  EGFR, BLOOD PRESSURE, AND PROTEINURIA TREATMENT SUGGESTIONS  -  Based upon current eGFR, patient is at high risk for adverse  outcomes such as CKD progression, CVD, and mortality.  Guidelines recommend treating patients with type 2 diabetes  and CKD with an SGLT2 inhibitor for cardiorenal protection.  Guidelines recommend a target blood pressure of 120/80 mmHg  or less to reduce cardiovascular risk and CKD progression. A  higher blood pressure target may be appropriate in older  individuals to avoid symptomatic hypot                           ension.  EGFR, BLOOD PRESSURE, AND PROTEINURIA FOLLOW-UP  -  Spot Urine Panel (Albumin preferred) within 12 months;  Hemoglobin A1C within 6 months; fasting Renal Panel within 3  months;  BONE and MINERAL ASSESSMENT  Calcium is within goal and has decreased, was 9.9 and now is  9.5 mg/dL. Carbon Dioxide is below goal and has not changed  significantly, was 16 and now is 17 mmol/L. Vitamin D is  adequate (54.2 ng/mL 4/26/2022) .  BONE and MINERAL TREATMENT SUGGESTIONS  -  Interpretations require simultaneous measurements of serum  calcium and phosphorus. If not on alkali, begin sodium  bicarbonate, one 650 mg pill 2-3 times daily, otherwise  increase dose.  BONE and MINERAL FOLLOW-UP  -  25-Hydroxy Vitamin D within 9 months; fasting Renal Panel  and fasting PTH with Renal Panel within 3 months;  LIPIDS ASSESSMENT  Blood was non-fasting; interpret these results with caution.  LDL-C is normal and has decreased, was 131 and now is 99  mg/dL. Triglyceride is normal and has decreased, was 25                           4 and  now is 115 mg/dL. Non-HDL Cholesterol is normal and has  decreased, was 176 and now is 120 mg/dL. HDL-C is low and  has decreased, was 56 and now is 39 mg/dL.  LIPIDS TREATMENT SUGGESTIONS  -  Therapeutic lifestyle changes are  always valuable to  maintain optimal blood lipid status (diet, exercise, weight  management). If at least a 50% LDL reduction from baseline  has not been achieved, begin or increase statin. Consider  measurement of LDL particle number or Apo B to adjudicate  need for further LDL lowering therapy. If statin cannot be  tolerated or increased, alternatives include use of an  intestinal agent (ezetimibe or bile acid sequestrant) or  niacin.  LIPIDS FOLLOW-UP  -  fasting Lipid Panel within 12 months;  ANEMIA ASSESSMENT  Hemoglobin is low and has decreased, was 15.6 and now is  11.5 g/dL. Hemoglobin target assumes KAVITA is not in use.  Hemoglobin has fallen by more than 2 g/dL since the last  measurement. Consider clinical evaluation for hemolysis and  blood loss.                             ANEMIA TREATMENT SUGGESTIONS  -  Iron deficiency is a common cause of anemia in CKD.  Recommend measurement of Ferritin and TSAT.  ANEMIA FOLLOW-UP  -  CBC within 3 months; Fe/TIBC (TSAT) and Ferritin with CBC is  due;  -------------------------------  DISCLAIMER  These assessments and treatment suggestions are provided as  a convenience in support of the physician-patient  relationship and are not intended to replace the physician's  clinical judgment. They are derived from national guidelines  in addition to other evidence and expert opinion. The  clinician should consider this information within the  context of clinical opinion and the individual patient.  SEE GUIDANCE FOR CHRONIC KIDNEY DISEASE PROGRAM: Kidney  Disease Improving Global Outcomes (KDIGO) clinical practice  guidelines are at http://kdigo.org/home/guidelines/.  National Kid[employer] Foundation Kidney Disease Outcomes Quality  Initiative (KDOQI (TM)), with its limitations and  disclaimers, are at www.kidney.org/professional                           s/KDOQI. This  program is intended for patients who have been diagnosed  with stages 3, 4, or pre-dialysis 5 CKD. It is not  intended  for children, pregnant patients, or transplant patients.     • Interpretation 07/22/2022 Note   Final    Supplemental report is available.     Assessment & Plan   Diagnoses and all orders for this visit:    1. 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 Essential hypertension    3. Coronary artery disease involving native coronary artery of native heart without angina pectoris  -     Lipid Panel    4. Vitamin D deficiency  -     Vitamin D,25-Hydroxy    5. Hypogonadism in male  -     Testosterone, Free / Tot Equilib  -     CBC & Differential  -     PSA DIAGNOSTIC    6. Statin intolerance  -     Lipid Panel    7. Benign non-nodular prostatic hyperplasia without lower urinary tract symptoms  -     Testosterone, Free / Tot Equilib  -     PSA DIAGNOSTIC    8. Dyslipidemia  -     Comprehensive Metabolic Panel  -     Lipid Panel      Continue no concentrated sweet low-fat diet.  Continue Zetia 10 mg/day.  Continue ergocalciferol 50,000 units twice a week.  Check vitamin D levels.  Continue AndroGel 1% 1 tube each arm daily.  Check testosterone, CBC, and PSA.  Continue Cialis 5 mg daily and finasteride 5 mg daily.  Patient advised to get a new PCP.  Advised to get a new PCP.    Follow-up in 3 months with MARC Mccurdy NP and with me in 6 months.  Total time 54 minutes.

## 2022-10-25 ENCOUNTER — OFFICE VISIT (OUTPATIENT)
Dept: ENDOCRINOLOGY | Age: 82
End: 2022-10-25

## 2022-10-25 VITALS
OXYGEN SATURATION: 97 % | TEMPERATURE: 97.6 F | DIASTOLIC BLOOD PRESSURE: 70 MMHG | WEIGHT: 156.2 LBS | HEART RATE: 70 BPM | HEIGHT: 70 IN | SYSTOLIC BLOOD PRESSURE: 124 MMHG | BODY MASS INDEX: 22.36 KG/M2

## 2022-10-25 DIAGNOSIS — E78.5 DYSLIPIDEMIA: ICD-10-CM

## 2022-10-25 DIAGNOSIS — E29.1 HYPOGONADISM IN MALE: ICD-10-CM

## 2022-10-25 DIAGNOSIS — E55.9 VITAMIN D DEFICIENCY: ICD-10-CM

## 2022-10-25 DIAGNOSIS — I25.10 CORONARY ARTERY DISEASE INVOLVING NATIVE CORONARY ARTERY OF NATIVE HEART WITHOUT ANGINA PECTORIS: ICD-10-CM

## 2022-10-25 DIAGNOSIS — E11.21 TYPE 2 DIABETES MELLITUS WITH NEPHROPATHY: Primary | ICD-10-CM

## 2022-10-25 DIAGNOSIS — I10 ESSENTIAL HYPERTENSION: ICD-10-CM

## 2022-10-25 DIAGNOSIS — Z78.9 STATIN INTOLERANCE: ICD-10-CM

## 2022-10-25 DIAGNOSIS — N40.0 BENIGN NON-NODULAR PROSTATIC HYPERPLASIA WITHOUT LOWER URINARY TRACT SYMPTOMS: ICD-10-CM

## 2022-10-25 PROCEDURE — 99215 OFFICE O/P EST HI 40 MIN: CPT | Performed by: INTERNAL MEDICINE

## 2022-10-29 DIAGNOSIS — R79.89 LOW TESTOSTERONE IN MALE: ICD-10-CM

## 2022-11-01 LAB
25(OH)D3+25(OH)D2 SERPL-MCNC: 75.1 NG/ML (ref 30–100)
ALBUMIN SERPL ELPH-MCNC: 4.1 G/DL (ref 2.9–4.4)
ALBUMIN SERPL-MCNC: 4.7 G/DL (ref 3.5–5.2)
ALBUMIN/GLOB SERPL: 1.5 {RATIO} (ref 0.7–1.7)
ALBUMIN/GLOB SERPL: 2 G/DL
ALP SERPL-CCNC: 68 U/L (ref 39–117)
ALPHA1 GLOB SERPL ELPH-MCNC: 0.3 G/DL (ref 0–0.4)
ALPHA2 GLOB SERPL ELPH-MCNC: 0.6 G/DL (ref 0.4–1)
ALT SERPL-CCNC: 17 U/L (ref 1–41)
AST SERPL-CCNC: 21 U/L (ref 1–40)
B-GLOBULIN SERPL ELPH-MCNC: 1.1 G/DL (ref 0.7–1.3)
BASOPHILS # BLD AUTO: 0.05 10*3/MM3 (ref 0–0.2)
BASOPHILS NFR BLD AUTO: 0.8 % (ref 0–1.5)
BILIRUB SERPL-MCNC: 0.9 MG/DL (ref 0–1.2)
BUN SERPL-MCNC: 23 MG/DL (ref 8–23)
BUN/CREAT SERPL: 15 (ref 7–25)
CALCIUM SERPL-MCNC: 10.3 MG/DL (ref 8.6–10.5)
CHLORIDE SERPL-SCNC: 105 MMOL/L (ref 98–107)
CHOLEST SERPL-MCNC: 176 MG/DL (ref 0–200)
CO2 SERPL-SCNC: 26.6 MMOL/L (ref 22–29)
CREAT SERPL-MCNC: 1.53 MG/DL (ref 0.76–1.27)
EGFRCR SERPLBLD CKD-EPI 2021: 45.7 ML/MIN/1.73
EOSINOPHIL # BLD AUTO: 0.07 10*3/MM3 (ref 0–0.4)
EOSINOPHIL NFR BLD AUTO: 1.1 % (ref 0.3–6.2)
ERYTHROCYTE [DISTWIDTH] IN BLOOD BY AUTOMATED COUNT: 11.4 % (ref 12.3–15.4)
GAMMA GLOB SERPL ELPH-MCNC: 0.9 G/DL (ref 0.4–1.8)
GLOBULIN SER CALC-MCNC: 2.3 GM/DL
GLOBULIN SER-MCNC: 2.9 G/DL (ref 2.2–3.9)
GLUCOSE SERPL-MCNC: 104 MG/DL (ref 65–99)
HBA1C MFR BLD: 5.7 % (ref 4.8–5.6)
HCT VFR BLD AUTO: 38.8 % (ref 37.5–51)
HDLC SERPL-MCNC: 54 MG/DL (ref 40–60)
HGB BLD-MCNC: 13.3 G/DL (ref 13–17.7)
IGA SERPL-MCNC: 163 MG/DL (ref 61–437)
IGG SERPL-MCNC: 824 MG/DL (ref 603–1613)
IGM SERPL-MCNC: 52 MG/DL (ref 15–143)
IMM GRANULOCYTES # BLD AUTO: 0.01 10*3/MM3 (ref 0–0.05)
IMM GRANULOCYTES NFR BLD AUTO: 0.2 % (ref 0–0.5)
IMP & REVIEW OF LAB RESULTS: NORMAL
INTERPRETATION SERPL IEP-IMP: NORMAL
LABORATORY COMMENT REPORT: NORMAL
LDLC SERPL CALC-MCNC: 106 MG/DL (ref 0–100)
LYMPHOCYTES # BLD AUTO: 1.05 10*3/MM3 (ref 0.7–3.1)
LYMPHOCYTES NFR BLD AUTO: 16.9 % (ref 19.6–45.3)
M PROTEIN SERPL ELPH-MCNC: NORMAL G/DL
MCH RBC QN AUTO: 31.7 PG (ref 26.6–33)
MCHC RBC AUTO-ENTMCNC: 34.3 G/DL (ref 31.5–35.7)
MCV RBC AUTO: 92.4 FL (ref 79–97)
MONOCYTES # BLD AUTO: 0.37 10*3/MM3 (ref 0.1–0.9)
MONOCYTES NFR BLD AUTO: 5.9 % (ref 5–12)
NEUTROPHILS # BLD AUTO: 4.67 10*3/MM3 (ref 1.7–7)
NEUTROPHILS NFR BLD AUTO: 75.1 % (ref 42.7–76)
NRBC BLD AUTO-RTO: 0 /100 WBC (ref 0–0.2)
PLATELET # BLD AUTO: 174 10*3/MM3 (ref 140–450)
POTASSIUM SERPL-SCNC: 4.5 MMOL/L (ref 3.5–5.2)
PROT SERPL-MCNC: 7 G/DL (ref 6–8.5)
PSA SERPL-MCNC: 0.77 NG/ML (ref 0–4)
RBC # BLD AUTO: 4.2 10*6/MM3 (ref 4.14–5.8)
SODIUM SERPL-SCNC: 143 MMOL/L (ref 136–145)
T4 FREE SERPL-MCNC: 1.03 NG/DL (ref 0.93–1.7)
TESTOST FREE MFR SERPL: 2.6 % (ref 1.5–4.2)
TESTOST FREE SERPL-MCNC: 9.46 NG/DL (ref 5–21)
TESTOST SERPL-MCNC: 364 NG/DL (ref 264–916)
TRIGL SERPL-MCNC: 84 MG/DL (ref 0–150)
TSH SERPL DL<=0.005 MIU/L-ACNC: 2.13 UIU/ML (ref 0.27–4.2)
VLDLC SERPL CALC-MCNC: 16 MG/DL (ref 5–40)
WBC # BLD AUTO: 6.22 10*3/MM3 (ref 3.4–10.8)

## 2022-11-01 RX ORDER — TESTOSTERONE GEL, 1% 10 MG/G
GEL TRANSDERMAL
Qty: 300 G | Refills: 0 | Status: SHIPPED | OUTPATIENT
Start: 2022-11-01 | End: 2022-12-21

## 2022-12-18 DIAGNOSIS — R79.89 LOW TESTOSTERONE IN MALE: ICD-10-CM

## 2022-12-21 RX ORDER — TESTOSTERONE GEL, 1% 10 MG/G
GEL TRANSDERMAL
Qty: 300 G | Refills: 0 | Status: SHIPPED | OUTPATIENT
Start: 2022-12-21 | End: 2023-01-31

## 2022-12-21 NOTE — TELEPHONE ENCOUNTER
Rx Refill Note  Requested Prescriptions     Pending Prescriptions Disp Refills   • testosterone (ANDROGEL) 50 MG/5GM (1%) gel gel [Pharmacy Med Name: TESTOSTERONE 1%(50MG) GEL 5GM UDT] 300 g      Sig: APPLY 1 PACKET TO EACH SHOULDER ONCE DAILY      Last office visit with prescribing clinician: 10/25/2022   Last telemedicine visit with prescribing clinician: 1/27/2023   Next office visit with prescribing clinician: 4/28/2023   {

## 2023-01-11 DIAGNOSIS — E78.5 DYSLIPIDEMIA: ICD-10-CM

## 2023-01-11 DIAGNOSIS — N18.4 TYPE 2 DIABETES MELLITUS WITH STAGE 4 CHRONIC KIDNEY DISEASE, WITHOUT LONG-TERM CURRENT USE OF INSULIN: ICD-10-CM

## 2023-01-11 DIAGNOSIS — E55.9 VITAMIN D DEFICIENCY: ICD-10-CM

## 2023-01-11 DIAGNOSIS — E79.0 HYPERURICEMIA: ICD-10-CM

## 2023-01-11 DIAGNOSIS — E11.22 TYPE 2 DIABETES MELLITUS WITH STAGE 4 CHRONIC KIDNEY DISEASE, WITHOUT LONG-TERM CURRENT USE OF INSULIN: ICD-10-CM

## 2023-01-11 DIAGNOSIS — N52.8 OTHER MALE ERECTILE DYSFUNCTION: ICD-10-CM

## 2023-01-11 DIAGNOSIS — R73.9 HYPERGLYCEMIA: ICD-10-CM

## 2023-01-11 DIAGNOSIS — I10 ESSENTIAL HYPERTENSION: ICD-10-CM

## 2023-01-11 DIAGNOSIS — R79.89 LOW TESTOSTERONE: ICD-10-CM

## 2023-01-11 RX ORDER — TADALAFIL 5 MG/1
TABLET ORAL
Qty: 90 TABLET | Refills: 1 | Status: SHIPPED | OUTPATIENT
Start: 2023-01-11

## 2023-01-27 ENCOUNTER — OFFICE VISIT (OUTPATIENT)
Dept: ENDOCRINOLOGY | Age: 83
End: 2023-01-27
Payer: MEDICARE

## 2023-01-27 VITALS
DIASTOLIC BLOOD PRESSURE: 70 MMHG | TEMPERATURE: 96.4 F | HEART RATE: 85 BPM | BODY MASS INDEX: 22.62 KG/M2 | WEIGHT: 158 LBS | SYSTOLIC BLOOD PRESSURE: 130 MMHG | OXYGEN SATURATION: 100 % | HEIGHT: 70 IN

## 2023-01-27 DIAGNOSIS — N18.4 TYPE 2 DIABETES MELLITUS WITH STAGE 4 CHRONIC KIDNEY DISEASE, WITHOUT LONG-TERM CURRENT USE OF INSULIN: Primary | ICD-10-CM

## 2023-01-27 DIAGNOSIS — E78.5 DYSLIPIDEMIA: ICD-10-CM

## 2023-01-27 DIAGNOSIS — E55.9 VITAMIN D DEFICIENCY: ICD-10-CM

## 2023-01-27 DIAGNOSIS — E29.1 HYPOGONADISM IN MALE: ICD-10-CM

## 2023-01-27 DIAGNOSIS — N18.4 BENIGN HYPERTENSION WITH CKD (CHRONIC KIDNEY DISEASE) STAGE IV: ICD-10-CM

## 2023-01-27 DIAGNOSIS — E11.22 TYPE 2 DIABETES MELLITUS WITH STAGE 4 CHRONIC KIDNEY DISEASE, WITHOUT LONG-TERM CURRENT USE OF INSULIN: Primary | ICD-10-CM

## 2023-01-27 DIAGNOSIS — I12.9 BENIGN HYPERTENSION WITH CKD (CHRONIC KIDNEY DISEASE) STAGE IV: ICD-10-CM

## 2023-01-27 PROCEDURE — 99214 OFFICE O/P EST MOD 30 MIN: CPT | Performed by: NURSE PRACTITIONER

## 2023-01-27 NOTE — PROGRESS NOTES
Chief Complaint  Chief Complaint   Patient presents with   • Diabetes     Pt doesn't use a meter, has no hx of retinopathy or neuropathy has been told he is prediabetic        Subjective          History of Present Illness    Randy Mccann 82 y.o. presents for a follow-up evaluation for type 2 DM     He has been diabetic since 2015     Pt is on the following medications for their DM: NCS low fat diet and weight loss     He went off Actos 15 mg and Tradjenta 5 mg in 2021    He has not used metformin in the past.        Denies chest pain, shortness of breath, vision changes or numbness and tingling in feet/hands.    Weight gain of 2 lbs since last visit.    Pt does not have a history of DM retinopathy.  Last eye exam was 09/22     Pt does have a history of nephropathy.  Patient is not currently taking ACE/ARB  Creatinine want from 1.86 to 2.67 with last labs in 03/22.  Most recent labs showed Creatinine of 3.25  eGFR went from 35 to 24 with last labs in 03/22.  Most recent labs showed eGFR of 19.  Referral to Nephrology was made - Pt never went to nephrology.     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  He has a hard time finding people to watch them when he has doctor appointments, which is why he never followed up with Nephrology.        Pt does not have neuropathy.     Pt does have a history of CAD.  He had 1 vessel coronary artery bypass and porcine aortic valve replacement at [place] in 2018.  His cardiologist is Dr. Enrrique Ogden.    Last A1C in 10/22 was 5.7    Last microalbumin in 07/22 was negative          Blood Sugars    Blood glucoses are not checked            Hyperlipidemia     Pt denies any muscle/body aches, chest pain, or shortness of breath    Pt is currently taking Zetia 10 mg daily.  He had muscle pain with Crestor in the past.  He has never used Lipitor or pravastatin    Last lipid panel in 10/22 showed Total 176, Triglycerides 84, HDL 54 and LDL  "106            Hypertension with CKD Stage 4     Pt denies any chest pain, palpitations, shortness of breath, or headache     Current regimen includes nothing  Stopped on his own amlodipine 10 mg daily     Olmesartan- amlodipine - HCTZ 40-10-25 mg daily was stopped due to kidney function and elevated potassium     Home -120/60  HR 80s              Low Testosterone     He is  and has fathered 2 children.  He is not planning any more children.     Current treatment includes  AndrGel 1% 1 tube on each arm    Last labs in 10/22 show total testosterone 364 and free testosterone 9.46    Last PSA on 10/22 was 0.769    Last H & H in 10/22 was 13.3 & 38.8          Vitamin D Deficiency    Current treatment includes 50,000 units twice a week    Last Vit D level was 75.1 in 10/22            I have reviewed the patient's allergies, medicines, past medical hx, family hx and social hx.    Objective   Vital Signs:   /70   Pulse 85   Temp 96.4 °F (35.8 °C) (Temporal)   Ht 177.8 cm (70\")   Wt 71.7 kg (158 lb)   SpO2 100%   BMI 22.67 kg/m²       Physical Exam   Physical Exam  Constitutional:       General: He is not in acute distress.     Appearance: Normal appearance. He is not diaphoretic.   HENT:      Head: Normocephalic and atraumatic.   Eyes:      General:         Right eye: No discharge.         Left eye: No discharge.   Cardiovascular:      Rate and Rhythm: Normal rate and regular rhythm.      Heart sounds: Murmur heard.   Pulmonary:      Effort: Pulmonary effort is normal. No respiratory distress.      Breath sounds: Normal breath sounds. No stridor. No wheezing, rhonchi or rales.   Chest:      Chest wall: No tenderness.   Skin:     General: Skin is warm and dry.   Neurological:      Mental Status: He is alert.   Psychiatric:         Mood and Affect: Mood normal.         Behavior: Behavior normal.                    Results Review:   Hemoglobin A1C   Date Value Ref Range Status   10/25/2022 5.70 (H) " 4.80 - 5.60 % Final     Comment:     Hemoglobin A1C Ranges:  Increased Risk for Diabetes  5.7% to 6.4%  Diabetes                     >= 6.5%  Diabetic Goal                < 7.0%     08/17/2018 7.2 (H) 4.3 - 5.6 % Final     Comment:     (note)  A1C% Reference Range:  4.3 - 5.6  Normal range  5.7 - 6.4  Pre-diabetic -increased risk for developing diabetes mellitus.  >=6.5      Diabetic -diagnostic of diabetes mellitus.  Note: For diagnosis of diabetes in individuals without unequivocal   hyperglycemia, results should be confirmed by repeat testing.    Patients with conditions that shorten erythrocyte survival, such as   recovery from acute blood loss, hemolytic anemia, kidney disease,   or the presence of unstable hemogloblins like HbSS, HbCC, and HbSC   may yield falsely decreased HbA1c test results. Iron deficiency may   yield falsely increased HbA1c test results.     Triglycerides   Date Value Ref Range Status   10/25/2022 84 0 - 150 mg/dL Final   01/14/2019 230 (H) <150 mg/dL Final     Comment:     Triglycerides Reference Ranges:  Borderline High: 150-199  High: 200-499  Very High: >500     HDL Cholesterol   Date Value Ref Range Status   10/25/2022 54 40 - 60 mg/dL Final   01/14/2019 30 (L) >40 mg/dL Final     LDL Cholesterol    Date Value Ref Range Status   01/14/2019 102 (H) 0 - 100 mg/dL Final     Comment:       LDL Cholesterol Reference Range  <100     Optimal  100-129  Near optimal/above optimal  130-159  Borderline high  160-189  High  >=190    Very high     LDL Chol Calc ([facility])   Date Value Ref Range Status   10/25/2022 106 (H) 0 - 100 mg/dL Final     VLDL Cholesterol   Date Value Ref Range Status   01/14/2019 46 (H) 0 - 30 mg/dL Final     VLDL Cholesterol Juan Daniel   Date Value Ref Range Status   10/25/2022 16 5 - 40 mg/dL Final         Assessment and Plan {CC Problem List  Visit Diagnosis  ROS  Review (Popup)  Health Maintenance  Quality  BestPractice  Medications  SmartSets  SnapShot Encounters   Media :23  Diagnoses and all orders for this visit:    1. Type 2 diabetes mellitus with stage 4 chronic kidney disease, without long-term current use of insulin (HCC) (Primary)  -     Hemoglobin A1c  -     Comprehensive Metabolic Panel  -     CBC Auto Differential    Check labs today  Continue with NCS low fat diet      2. Dyslipidemia  -     Comprehensive Metabolic Panel  -     Lipid Panel    Check labs today  Continue with Zetia      3. Benign hypertension with CKD (chronic kidney disease) stage IV (HCC)  -     Comprehensive Metabolic Panel    Stable  Continue with current medication regimen  Defer management to PCP/cardiology      4. Hypogonadism in male  -     CBC Auto Differential  -     Testosterone, Free, Total    Check labs today  Continue with AndroGel 1% 1 tube on each arm      5. Vitamin D deficiency    Continue with supplement       No refills needed at this time      Labs today  RTC on 04/28/23 with Dr. Pulido      Follow Up     Patient was given instructions and counseling regarding her condition or for health maintenance advice. Please see specific information pulled into the AVS if appropriate.              Susanna Mccurdy, APRN  01/27/23

## 2023-01-29 DIAGNOSIS — R79.89 LOW TESTOSTERONE IN MALE: ICD-10-CM

## 2023-01-30 NOTE — TELEPHONE ENCOUNTER
Rx Controlled Substance Protocol Failed 01/29/2023 07:45 PM   Protocol Details  Urine Toxicology Performed in Last 12 Months    Medication not refilled in past 28 days        Last ov appt 1/27/23  Next ov appt 4/28/23

## 2023-01-31 RX ORDER — TESTOSTERONE GEL, 1% 10 MG/G
GEL TRANSDERMAL
Qty: 300 G | Refills: 1 | Status: SHIPPED | OUTPATIENT
Start: 2023-01-31

## 2023-02-01 LAB
ALBUMIN SERPL-MCNC: 4.5 G/DL (ref 3.5–5.2)
ALBUMIN/GLOB SERPL: 2.3 G/DL
ALP SERPL-CCNC: 71 U/L (ref 39–117)
ALT SERPL-CCNC: 17 U/L (ref 1–41)
AST SERPL-CCNC: 22 U/L (ref 1–40)
BASOPHILS # BLD AUTO: 0.05 10*3/MM3 (ref 0–0.2)
BASOPHILS NFR BLD AUTO: 1 % (ref 0–1.5)
BILIRUB SERPL-MCNC: 0.6 MG/DL (ref 0–1.2)
BUN SERPL-MCNC: 24 MG/DL (ref 8–23)
BUN/CREAT SERPL: 18.9 (ref 7–25)
CALCIUM SERPL-MCNC: 9.7 MG/DL (ref 8.6–10.5)
CHLORIDE SERPL-SCNC: 103 MMOL/L (ref 98–107)
CHOLEST SERPL-MCNC: 145 MG/DL (ref 0–200)
CO2 SERPL-SCNC: 27 MMOL/L (ref 22–29)
CREAT SERPL-MCNC: 1.27 MG/DL (ref 0.76–1.27)
EGFRCR SERPLBLD CKD-EPI 2021: 57.1 ML/MIN/1.73
EOSINOPHIL # BLD AUTO: 0.13 10*3/MM3 (ref 0–0.4)
EOSINOPHIL NFR BLD AUTO: 2.6 % (ref 0.3–6.2)
ERYTHROCYTE [DISTWIDTH] IN BLOOD BY AUTOMATED COUNT: 12.6 % (ref 12.3–15.4)
GLOBULIN SER CALC-MCNC: 2 GM/DL
GLUCOSE SERPL-MCNC: 111 MG/DL (ref 65–99)
HBA1C MFR BLD: 5.3 % (ref 4.8–5.6)
HCT VFR BLD AUTO: 38.3 % (ref 37.5–51)
HDLC SERPL-MCNC: 52 MG/DL (ref 40–60)
HGB BLD-MCNC: 12.9 G/DL (ref 13–17.7)
IMM GRANULOCYTES # BLD AUTO: 0.01 10*3/MM3 (ref 0–0.05)
IMM GRANULOCYTES NFR BLD AUTO: 0.2 % (ref 0–0.5)
IMP & REVIEW OF LAB RESULTS: NORMAL
LDLC SERPL CALC-MCNC: 78 MG/DL (ref 0–100)
LYMPHOCYTES # BLD AUTO: 0.87 10*3/MM3 (ref 0.7–3.1)
LYMPHOCYTES NFR BLD AUTO: 17.7 % (ref 19.6–45.3)
MCH RBC QN AUTO: 31 PG (ref 26.6–33)
MCHC RBC AUTO-ENTMCNC: 33.7 G/DL (ref 31.5–35.7)
MCV RBC AUTO: 92.1 FL (ref 79–97)
MONOCYTES # BLD AUTO: 0.31 10*3/MM3 (ref 0.1–0.9)
MONOCYTES NFR BLD AUTO: 6.3 % (ref 5–12)
NEUTROPHILS # BLD AUTO: 3.54 10*3/MM3 (ref 1.7–7)
NEUTROPHILS NFR BLD AUTO: 72.2 % (ref 42.7–76)
NRBC BLD AUTO-RTO: 0 /100 WBC (ref 0–0.2)
PLATELET # BLD AUTO: 201 10*3/MM3 (ref 140–450)
POTASSIUM SERPL-SCNC: 4.4 MMOL/L (ref 3.5–5.2)
PROT SERPL-MCNC: 6.5 G/DL (ref 6–8.5)
RBC # BLD AUTO: 4.16 10*6/MM3 (ref 4.14–5.8)
REPORT: NORMAL
SODIUM SERPL-SCNC: 141 MMOL/L (ref 136–145)
TESTOST FREE SERPL-MCNC: 10 PG/ML (ref 6.6–18.1)
TESTOST SERPL-MCNC: 736 NG/DL (ref 264–916)
TRIGL SERPL-MCNC: 75 MG/DL (ref 0–150)
VLDLC SERPL CALC-MCNC: 15 MG/DL (ref 5–40)
WBC # BLD AUTO: 4.91 10*3/MM3 (ref 3.4–10.8)

## 2023-03-12 DIAGNOSIS — F52.21 ERECTILE DYSFUNCTION OF NONORGANIC ORIGIN: ICD-10-CM

## 2023-03-12 DIAGNOSIS — E11.9 TYPE 2 DIABETES MELLITUS WITHOUT COMPLICATION, WITHOUT LONG-TERM CURRENT USE OF INSULIN: ICD-10-CM

## 2023-03-12 DIAGNOSIS — R79.89 LOW TESTOSTERONE: ICD-10-CM

## 2023-03-13 RX ORDER — FINASTERIDE 5 MG/1
5 TABLET, FILM COATED ORAL DAILY
Qty: 90 TABLET | Refills: 1 | Status: SHIPPED | OUTPATIENT
Start: 2023-03-13

## 2023-04-07 ENCOUNTER — PRIOR AUTHORIZATION (OUTPATIENT)
Dept: ENDOCRINOLOGY | Age: 83
End: 2023-04-07
Payer: MEDICARE

## 2023-04-12 ENCOUNTER — TELEPHONE (OUTPATIENT)
Dept: ENDOCRINOLOGY | Age: 83
End: 2023-04-12
Payer: MEDICARE

## 2023-04-12 NOTE — TELEPHONE ENCOUNTER
4/12/23 Called and spoke with patient letting him know tadalafil was denied by his ins co.    Patient voices he understands to call ins co to see which alternatives they recc.

## 2023-04-28 ENCOUNTER — OFFICE VISIT (OUTPATIENT)
Dept: ENDOCRINOLOGY | Age: 83
End: 2023-04-28
Payer: MEDICARE

## 2023-04-28 VITALS
HEART RATE: 89 BPM | BODY MASS INDEX: 22.1 KG/M2 | OXYGEN SATURATION: 98 % | TEMPERATURE: 97.5 F | SYSTOLIC BLOOD PRESSURE: 148 MMHG | DIASTOLIC BLOOD PRESSURE: 76 MMHG | HEIGHT: 70 IN | WEIGHT: 154.4 LBS

## 2023-04-28 DIAGNOSIS — E78.5 HYPERLIPIDEMIA, UNSPECIFIED HYPERLIPIDEMIA TYPE: ICD-10-CM

## 2023-04-28 DIAGNOSIS — R79.89 LOW TESTOSTERONE IN MALE: ICD-10-CM

## 2023-04-28 DIAGNOSIS — E78.5 DYSLIPIDEMIA: ICD-10-CM

## 2023-04-28 DIAGNOSIS — E79.0 HYPERURICEMIA: ICD-10-CM

## 2023-04-28 DIAGNOSIS — I25.10 CORONARY ARTERY DISEASE INVOLVING NATIVE CORONARY ARTERY OF NATIVE HEART WITHOUT ANGINA PECTORIS: ICD-10-CM

## 2023-04-28 DIAGNOSIS — N52.8 OTHER MALE ERECTILE DYSFUNCTION: ICD-10-CM

## 2023-04-28 DIAGNOSIS — R79.89 LOW TESTOSTERONE: ICD-10-CM

## 2023-04-28 DIAGNOSIS — Z95.3 HISTORY OF AORTIC VALVE REPLACEMENT WITH PORCINE VALVE: ICD-10-CM

## 2023-04-28 DIAGNOSIS — N40.0 BENIGN NON-NODULAR PROSTATIC HYPERPLASIA WITHOUT LOWER URINARY TRACT SYMPTOMS: ICD-10-CM

## 2023-04-28 DIAGNOSIS — E55.9 VITAMIN D DEFICIENCY: ICD-10-CM

## 2023-04-28 DIAGNOSIS — I10 ESSENTIAL HYPERTENSION: ICD-10-CM

## 2023-04-28 DIAGNOSIS — R73.9 HYPERGLYCEMIA: ICD-10-CM

## 2023-04-28 DIAGNOSIS — E11.21 TYPE 2 DIABETES MELLITUS WITH NEPHROPATHY: Primary | ICD-10-CM

## 2023-04-28 DIAGNOSIS — Z78.9 STATIN INTOLERANCE: ICD-10-CM

## 2023-04-28 RX ORDER — EZETIMIBE 10 MG/1
10 TABLET ORAL DAILY
Qty: 30 TABLET | Refills: 6 | Status: SHIPPED | OUTPATIENT
Start: 2023-04-28

## 2023-04-28 NOTE — PROGRESS NOTES
Subjective   Randy Mccann is a 82 y.o. male.     History of Present Illness        He has known diabetes mellitus since 2015.  He is on diet alone.  He went off Actos 15 mg and Tradjenta 5 mg when he changed his diet in 6/22.  He has not used metformin in the past.  He does not check his blood sugars at home.  He has lost 4 pounds since January 2023.  His last meal was last night.     His last eye examination was in 9/22.  He has no retinopathy.  He denies blurry vision.  He has azotemia with serum creatinine ranging from 1.46-3.25 since 2021.  He did not schedule an appointment with nephrology.  Urine microalbumin done in 7/22 is normal.  He denies numbness, tingling or burning in his hands or feet.     He has hypertension and coronary artery disease.  He had 1 vessel coronary artery bypass and porcine aortic valve replacement at [facility] in 2018.  He went off Tribenzor after he lost weight.  He denies history of heart attack or stroke.  He denies chest pain or shortness of breath.  His cardiologist is Dr. Enrrique Ogden.      He has vitamin D deficiency and is on ergocalciferol 50,000 units twice a week.  He has occasional diarrhea.  He has no history of celiac disease.     He has low testosterone and has been on AndroGel 1% 1 tube on each arm for several years.  He last used it yesterday.  He does not know the cause of the low testosterone.  He is  and has fathered 2 children.  He is not planning any more children.     He denies headaches, heat or cold intolerance, changes in shoes or ring size, easy bruising, or muscle weakness.     He has history of enlarged prostate and is on Cialis 5 mg/day and finasteride 5 mg once a day. He denies difficulty initiating urination, dysuria,  hesitancy, or retention.  PSA done in October 2022 is normal at 0.769.     He has hyperlipidemia and went off Zetia 10 mg on his own in 1022.  He had muscle pain with Crestor in the past.  He has never used Lipitor or  "pravastatin.          The following portions of the patient's history were reviewed and updated as appropriate: allergies, current medications, past family history, past medical history, past social history, past surgical history and problem list.    Review of Systems   Eyes: Negative for visual disturbance.   Respiratory: Negative for shortness of breath.    Cardiovascular: Negative for chest pain and palpitations.   Gastrointestinal: Negative.    Endocrine: Negative for cold intolerance and heat intolerance.   Musculoskeletal: Negative for myalgias.   Neurological: Negative for numbness.     Vitals:    04/28/23 1136   BP: 148/76   Pulse: 89   Temp: 97.5 °F (36.4 °C)   TempSrc: Temporal   SpO2: 98%   Weight: 70 kg (154 lb 6.4 oz)   Height: 177.8 cm (70\")      Objective   Physical Exam  Constitutional:       Appearance: Normal appearance. He is not toxic-appearing or diaphoretic.   Eyes:      General: No scleral icterus.        Right eye: No discharge.         Left eye: No discharge.   Neck:      Vascular: No carotid bruit.   Cardiovascular:      Rate and Rhythm: Regular rhythm.      Comments: Prosthetic click  Pulmonary:      Breath sounds: Normal breath sounds. No wheezing or rales.   Abdominal:      General: Bowel sounds are normal.      Palpations: Abdomen is soft.   Musculoskeletal:      Right lower leg: No edema.      Left lower leg: No edema.      Comments: Onychomycosis of toes.  No plantar ulcers.  Dorsalis pedis pulses palpable   Lymphadenopathy:      Cervical: No cervical adenopathy.   Skin:     General: Skin is warm.   Neurological:      Mental Status: He is alert and oriented to person, place, and time.      Comments: Intact light touch in lower extremities.       Orders Only on 01/27/2023   Component Date Value Ref Range Status   • WBC 01/27/2023 4.91  3.40 - 10.80 10*3/mm3 Final   • RBC 01/27/2023 4.16  4.14 - 5.80 10*6/mm3 Final   • Hemoglobin 01/27/2023 12.9 (L)  13.0 - 17.7 g/dL Final   • " Hematocrit 01/27/2023 38.3  37.5 - 51.0 % Final   • MCV 01/27/2023 92.1  79.0 - 97.0 fL Final   • MCH 01/27/2023 31.0  26.6 - 33.0 pg Final   • MCHC 01/27/2023 33.7  31.5 - 35.7 g/dL Final   • RDW 01/27/2023 12.6  12.3 - 15.4 % Final   • Platelets 01/27/2023 201  140 - 450 10*3/mm3 Final   • Neutrophil Rel % 01/27/2023 72.2  42.7 - 76.0 % Final   • Lymphocyte Rel % 01/27/2023 17.7 (L)  19.6 - 45.3 % Final   • Monocyte Rel % 01/27/2023 6.3  5.0 - 12.0 % Final   • Eosinophil Rel % 01/27/2023 2.6  0.3 - 6.2 % Final   • Basophil Rel % 01/27/2023 1.0  0.0 - 1.5 % Final   • Neutrophils Absolute 01/27/2023 3.54  1.70 - 7.00 10*3/mm3 Final   • Lymphocytes Absolute 01/27/2023 0.87  0.70 - 3.10 10*3/mm3 Final   • Monocytes Absolute 01/27/2023 0.31  0.10 - 0.90 10*3/mm3 Final   • Eosinophils Absolute 01/27/2023 0.13  0.00 - 0.40 10*3/mm3 Final   • Basophils Absolute 01/27/2023 0.05  0.00 - 0.20 10*3/mm3 Final   • Immature Granulocyte Rel % 01/27/2023 0.2  0.0 - 0.5 % Final   • Immature Grans Absolute 01/27/2023 0.01  0.00 - 0.05 10*3/mm3 Final   • nRBC 01/27/2023 0.0  0.0 - 0.2 /100 WBC Final   • Glucose 01/27/2023 111 (H)  65 - 99 mg/dL Final   • BUN 01/27/2023 24 (H)  8 - 23 mg/dL Final   • Creatinine 01/27/2023 1.27  0.76 - 1.27 mg/dL Final   • EGFR Result 01/27/2023 57.1 (L)  >60.0 mL/min/1.73 Final    Comment: GFR Normal >60  Chronic Kidney Disease <60  Kidney Failure <15  The GFR formula is only valid for adults with stable renal  function between ages 18 and 70.     • BUN/Creatinine Ratio 01/27/2023 18.9  7.0 - 25.0 Final   • Sodium 01/27/2023 141  136 - 145 mmol/L Final   • Potassium 01/27/2023 4.4  3.5 - 5.2 mmol/L Final   • Chloride 01/27/2023 103  98 - 107 mmol/L Final   • Total CO2 01/27/2023 27.0  22.0 - 29.0 mmol/L Final   • Calcium 01/27/2023 9.7  8.6 - 10.5 mg/dL Final   • Total Protein 01/27/2023 6.5  6.0 - 8.5 g/dL Final   • Albumin 01/27/2023 4.5  3.5 - 5.2 g/dL Final   • Globulin 01/27/2023 2.0  gm/dL Final    • A/G Ratio 01/27/2023 2.3  g/dL Final   • Total Bilirubin 01/27/2023 0.6  0.0 - 1.2 mg/dL Final   • Alkaline Phosphatase 01/27/2023 71  39 - 117 U/L Final   • AST (SGOT) 01/27/2023 22  1 - 40 U/L Final   • ALT (SGPT) 01/27/2023 17  1 - 41 U/L Final   • Total Cholesterol 01/27/2023 145  0 - 200 mg/dL Final    Comment: Cholesterol Reference Ranges  (U.S. Department of Health and Human Services ATP III  Classifications)  Desirable          <200 mg/dL  Borderline High    200-239 mg/dL  High Risk          >240 mg/dL  Triglyceride Reference Ranges  (U.S. Department of Health and Human Services ATP III  Classifications)  Normal           <150 mg/dL  Borderline High  150-199 mg/dL  High             200-499 mg/dL  Very High        >500 mg/dL  HDL Reference Ranges  (U.S. Department of Health and Human Services ATP III  Classifications)  Low     <40 mg/dl (major risk factor for CHD)  High    >60 mg/dl ('negative' risk factor for CHD)  LDL Reference Ranges  (U.S. Department of Health and Human Services ATP III  Classifications)  Optimal          <100 mg/dL  Near Optimal     100-129 mg/dL  Borderline High  130-159 mg/dL  High             160-189 mg/dL  Very High        >189 mg/dL     • Triglycerides 01/27/2023 75  0 - 150 mg/dL Final   • HDL Cholesterol 01/27/2023 52  40 - 60 mg/dL Final   • VLDL Cholesterol Juan Daniel 01/27/2023 15  5 - 40 mg/dL Final   • LDL Chol Calc ([facility]) 01/27/2023 78  0 - 100 mg/dL Final   • Interpretation 01/27/2023 Note   Final    Comment: -------------------------------  CHRONIC KIDNEY DISEASE:  EGFR, BLOOD PRESSURE, AND PROTEINURIA ASSESSMENT  Estimated GFR is rising significantly with time (most recent  values). Current eGFR is 57 mL/min/1.73mE2 corresponding to  CKD stage 3a. Potassium is within goal and has not changed  significantly, was 4.5 and now is 4.4 mmol/L. Glycemic  control (HB A1c: 5.3 %) is tight. Consider targeting Hb A1C  near 7% if patient is at risk for hypoglycemia.  EGFR, BLOOD  PRESSURE, AND PROTEINURIA TREATMENT SUGGESTIONS  -  Guidelines recommend treating patients with type 2 diabetes  and CKD with an SGLT2 inhibitor for cardiorenal protection.  Guidelines recommend a target blood pressure of 120/80 mmHg  or less to reduce cardiovascular risk and CKD progression. A  higher blood pressure target may be appropriate in older  individuals to avoid symptomatic hypotension. Assessment of  albuminuria (urine albumin:creatinine ratio or urine  protein:creatinine ratio preferred) is recommended at least                             annually in CKD patients for staging and disease prognosis.  EGFR, BLOOD PRESSURE, AND PROTEINURIA FOLLOW-UP  -  fasting Renal Panel within 12 months; Spot Urine Panel  (Albumin preferred) is recommended by guidelines, at least  yearly; Hemoglobin A1C within 6 months;  BONE and MINERAL ASSESSMENT  Calcium is within goal and has decreased, was 10.3 and now  is 9.7 mg/dL. Carbon Dioxide is within goal and has not  changed significantly, was 27 and now is 27 mmol/L. Vitamin  D is adequate (75.1 ng/mL 10/25/2022) .  BONE and MINERAL TREATMENT SUGGESTIONS  -  Interpretations require simultaneous measurements of serum  calcium and phosphorus.  BONE and MINERAL FOLLOW-UP  -  25-Hydroxy Vitamin D within 9 months; fasting PTH with Renal  Panel is due;  LIPIDS ASSESSMENT  Blood was non-fasting; interpret these results with caution.  LDL-C is normal and has decreased, was 106 and now is 78  mg/dL. Triglyceride is normal and has not changed  significantly, was 84 and now is 75 mg/dL. Non-HDL  Chol                           esterol is optimal and has decreased, was 122 and now is  93 mg/dL. HDL-C is normal and has not changed significantly,  was 54 and now is 52 mg/dL.  LIPIDS TREATMENT SUGGESTIONS  -  Therapeutic lifestyle changes are always valuable to  maintain optimal blood lipid status (diet, exercise, weight  management). If at least a 50% LDL reduction from baseline  has not  been achieved, begin or increase statin. Consider  measurement of LDL particle number or Apo B to adjudicate  need for further LDL lowering therapy. If statin cannot be  tolerated or increased, alternatives include use of an  intestinal agent (ezetimibe or bile acid sequestrant) or  niacin.  LIPIDS FOLLOW-UP  -  fasting Lipid Panel within 12 months;  ANEMIA ASSESSMENT  Hemoglobin is low and has decreased, was 13.3 and now is  12.9 g/dL. Hemoglobin target assumes KAVITA is not in use.  ANEMIA TREATMENT SUGGESTIONS  -  Iron deficiency is a common cause of anemia in CKD.  Recommend measurement of Ferritin and TSAT.  ANEMIA FOLLOW-UP                             -  CBC within 3 months; Fe/TIBC (TSAT) and Ferritin with CBC is  due;  -------------------------------  DISCLAIMER  These assessments and treatment suggestions are provided as  a convenience in support of the physician-patient  relationship and are not intended to replace the physician's  clinical judgment. They are derived from national guidelines  in addition to other evidence and expert opinion. The  clinician should consider this information within the  context of clinical opinion and the individual patient.  SEE GUIDANCE FOR CHRONIC KIDNEY DISEASE PROGRAM: Kidney  Disease Improving Global Outcomes (KDIGO) clinical practice  guidelines are at http://kdigo.org/home/guidelines/.  National Kid[employer] Kidney Disease Outcomes Quality  Initiative (KDOQI (TM)), with its limitations and  disclaimers, are at www.kidney.org/professionals/KDOQI. This  program is intended for patients who have been diagnosed  with stages 3, 4, or pre-dialysis 5 CKD. It is not intended  for children, p                           regnant patients, or transplant patients.     • Testosterone, Total 01/27/2023 736  264 - 916 ng/dL Final    Comment: Adult male reference interval is based on a population of  healthy nonobese males (BMI <30) between 19 and 39 years old.  Kemal et.al. JCEM  2017,102;0252-8355. PMID: [id number].     • Testosterone, Free 01/27/2023 10.0  6.6 - 18.1 pg/mL Final   • Hemoglobin A1C 01/27/2023 5.30  4.80 - 5.60 % Final    Comment: Hemoglobin A1C Ranges:  Increased Risk for Diabetes  5.7% to 6.4%  Diabetes                     >= 6.5%  Diabetic Goal                < 7.0%     • Interpretation 01/27/2023 Note   Final    Supplemental report is available.     Assessment & Plan   Diagnoses and all orders for this visit:    1. 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 Vitamin D deficiency  -     Vitamin D,25-Hydroxy    3. Essential hypertension  -     Comprehensive Metabolic Panel    4. Coronary artery disease involving native coronary artery of native heart without angina pectoris  -     Lipid Panel    5. History of aortic valve replacement with porcine valve    6. Statin intolerance    7. Benign non-nodular prostatic hyperplasia without lower urinary tract symptoms    8. Hyperlipidemia, unspecified hyperlipidemia type  -     CK  -     Aldolase  -     Comprehensive Metabolic Panel  -     Lipid Panel  -     TSH    9. Low testosterone in male  -     Testosterone, Free / Tot Equilib  -     CBC & Differential      Continue no concentrated sweet low-fat diet.  Check lipids.    Continue ergocalciferol.  Check vitamin D levels and consider switching to vitamin D3.  Continue AndroGel 1% 1 tube on each arm daily.  Continue Cialis and finasteride.  Advised to get a new PCP.    RTC 4 mos with MARC Mccurdy NP and with me in 8 mos.

## 2023-05-08 LAB
25(OH)D3+25(OH)D2 SERPL-MCNC: 118 NG/ML (ref 30–100)
ALBUMIN SERPL-MCNC: 4.6 G/DL (ref 3.5–5.2)
ALBUMIN/GLOB SERPL: 2.2 G/DL
ALP SERPL-CCNC: 72 U/L (ref 39–117)
ALT SERPL-CCNC: 17 U/L (ref 1–41)
AST SERPL-CCNC: 25 U/L (ref 1–40)
BASOPHILS # BLD AUTO: 0.06 10*3/MM3 (ref 0–0.2)
BASOPHILS NFR BLD AUTO: 0.9 % (ref 0–1.5)
BILIRUB SERPL-MCNC: 0.9 MG/DL (ref 0–1.2)
BUN SERPL-MCNC: 27 MG/DL (ref 8–23)
BUN/CREAT SERPL: 21.3 (ref 7–25)
CALCIUM SERPL-MCNC: 9.5 MG/DL (ref 8.6–10.5)
CHLORIDE SERPL-SCNC: 105 MMOL/L (ref 98–107)
CHOLEST SERPL-MCNC: 149 MG/DL (ref 0–200)
CO2 SERPL-SCNC: 26 MMOL/L (ref 22–29)
CREAT SERPL-MCNC: 1.27 MG/DL (ref 0.76–1.27)
EGFRCR SERPLBLD CKD-EPI 2021: 57.1 ML/MIN/1.73
EOSINOPHIL # BLD AUTO: 0.12 10*3/MM3 (ref 0–0.4)
EOSINOPHIL NFR BLD AUTO: 1.9 % (ref 0.3–6.2)
ERYTHROCYTE [DISTWIDTH] IN BLOOD BY AUTOMATED COUNT: 12.9 % (ref 12.3–15.4)
GLOBULIN SER CALC-MCNC: 2.1 GM/DL
GLUCOSE SERPL-MCNC: 102 MG/DL (ref 65–99)
HBA1C MFR BLD: 5.3 % (ref 4.8–5.6)
HCT VFR BLD AUTO: 39.6 % (ref 37.5–51)
HDLC SERPL-MCNC: 50 MG/DL (ref 40–60)
HGB BLD-MCNC: 13.1 G/DL (ref 13–17.7)
IMM GRANULOCYTES # BLD AUTO: 0.02 10*3/MM3 (ref 0–0.05)
IMM GRANULOCYTES NFR BLD AUTO: 0.3 % (ref 0–0.5)
IMP & REVIEW OF LAB RESULTS: NORMAL
LDLC SERPL CALC-MCNC: 82 MG/DL (ref 0–100)
LYMPHOCYTES # BLD AUTO: 1.13 10*3/MM3 (ref 0.7–3.1)
LYMPHOCYTES NFR BLD AUTO: 17.9 % (ref 19.6–45.3)
MCH RBC QN AUTO: 31 PG (ref 26.6–33)
MCHC RBC AUTO-ENTMCNC: 33.1 G/DL (ref 31.5–35.7)
MCV RBC AUTO: 93.8 FL (ref 79–97)
MONOCYTES # BLD AUTO: 0.38 10*3/MM3 (ref 0.1–0.9)
MONOCYTES NFR BLD AUTO: 6 % (ref 5–12)
NEUTROPHILS # BLD AUTO: 4.62 10*3/MM3 (ref 1.7–7)
NEUTROPHILS NFR BLD AUTO: 73 % (ref 42.7–76)
NRBC BLD AUTO-RTO: 0.2 /100 WBC (ref 0–0.2)
PLATELET # BLD AUTO: 196 10*3/MM3 (ref 140–450)
POTASSIUM SERPL-SCNC: 4.6 MMOL/L (ref 3.5–5.2)
PROT SERPL-MCNC: 6.7 G/DL (ref 6–8.5)
RBC # BLD AUTO: 4.22 10*6/MM3 (ref 4.14–5.8)
SODIUM SERPL-SCNC: 141 MMOL/L (ref 136–145)
TESTOST FREE MFR SERPL: 1.7 % (ref 1.5–4.2)
TESTOST FREE SERPL-MCNC: 6.6 NG/DL (ref 5–21)
TESTOST SERPL-MCNC: 388 NG/DL (ref 264–916)
TRIGL SERPL-MCNC: 90 MG/DL (ref 0–150)
TSH SERPL DL<=0.005 MIU/L-ACNC: 2.38 UIU/ML (ref 0.27–4.2)
VLDLC SERPL CALC-MCNC: 17 MG/DL (ref 5–40)
WBC # BLD AUTO: 6.33 10*3/MM3 (ref 3.4–10.8)

## 2023-05-09 DIAGNOSIS — R79.89 LOW TESTOSTERONE IN MALE: ICD-10-CM

## 2023-05-12 RX ORDER — TESTOSTERONE GEL, 1% 10 MG/G
GEL TRANSDERMAL
Qty: 300 G | Refills: 0 | Status: SHIPPED | OUTPATIENT
Start: 2023-05-12

## 2023-05-12 NOTE — TELEPHONE ENCOUNTER
Last ov appt 4/28  Next ov appt 9/1/23     Rx #: [id number]    Controlled Substance Med Protocol Failed 05/09/2023 12:22 PM   Protocol Details  Urine Toxicology Performed in Last 12 Months    Controlled Substance Agreement is on file    Medication not refilled in past 28 days

## 2023-05-14 DIAGNOSIS — E78.5 HYPERLIPIDEMIA, UNSPECIFIED HYPERLIPIDEMIA TYPE: ICD-10-CM

## 2023-05-14 DIAGNOSIS — E78.5 DYSLIPIDEMIA: ICD-10-CM

## 2023-05-14 DIAGNOSIS — E11.21 TYPE 2 DIABETES MELLITUS WITH NEPHROPATHY: ICD-10-CM

## 2023-05-14 DIAGNOSIS — E55.9 VITAMIN D DEFICIENCY: ICD-10-CM

## 2023-05-14 DIAGNOSIS — R94.8 ABNORMAL RESULTS OF FUNCTION STUDIES OF OTHER ORGANS AND SYSTEMS: ICD-10-CM

## 2023-05-14 DIAGNOSIS — R79.89 LOW TESTOSTERONE IN MALE: Primary | ICD-10-CM

## 2023-05-14 RX ORDER — MELATONIN
1000 DAILY
Qty: 100 TABLET | Refills: 2 | Status: SHIPPED | OUTPATIENT
Start: 2023-05-14

## 2023-06-10 NOTE — PROGRESS NOTES
"Subjective   Randy Mccann is a 78 y.o. male seen for follow up for DM2, hyperlipidemia, HTN, testosterone deficiency, vit d deficiency, lab review. He is not checking BG. He denies any problems or concerns.     History of Present Illness this is a 78-year-old gentleman known patient with type 2 diabetes hypertension and dyslipidemia as well as hyperuricemia and hypogonadism and erectile dysfunction.  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    /72   Resp 16   Ht 170.2 cm (67\")   Wt 85.6 kg (188 lb 12.8 oz)   BMI 29.57 kg/m²      Allergies   Allergen Reactions   • Statins Other (See Comments)     Cramps,joint pain  Cramps,joint pain  Cramps,joint pain       Current Outpatient Medications:   •  aspirin 81 MG EC tablet, Take 81 mg by mouth Daily., Disp: , Rfl:   •  BYSTOLIC 20 MG tablet, Take 1 tablet by mouth Daily., Disp: 90 tablet, Rfl: 3  •  ergocalciferol (ERGOCALCIFEROL) 43403 units capsule, Take 1 capsule by mouth 1 (One) Time Per Week., Disp: 13 capsule, Rfl: 3  •  ezetimibe (ZETIA) 10 MG tablet, Take 1 tablet by mouth Daily., Disp: 90 tablet, Rfl: 3  •  Multiple Vitamins-Minerals (MULTI VITAMIN/MINERALS) tablet, Take 1 tablet by mouth daily., Disp: , Rfl:   •  Olmesartan-amLODIPine-HCTZ 40-10-25 MG tablet, TAKE 1 TABLET BY MOUTH DAILY, Disp: 30 tablet, Rfl: 0  •  SYNTHROID 50 MCG tablet, Take 1 tablet by mouth Daily., Disp: 90 tablet, Rfl: 3  •  tadalafil (CIALIS) 5 MG tablet, Take 1 tablet by mouth Daily As Needed for erectile dysfunction., Disp: 90 tablet, Rfl: 0  •  testosterone (TESTIM) 50 MG/5GM (1%) gel gel, Place 100 mg on the skin as directed by provider Daily., Disp: 60 tube, Rfl: 5  •  ULORIC 40 MG tablet, Take 1 tablet by mouth Daily., Disp: 90 tablet, Rfl: 3  •  warfarin (COUMADIN) 2.5 MG tablet, Take 2.5 mg by mouth Daily., Disp: , Rfl:       The following portions of the patient's history were reviewed and updated as " appropriate: allergies, current medications, past family history, past medical history, past social history, past surgical history and problem list.    Review of Systems   Constitutional: Negative.    HENT: Negative.    Eyes: Negative.    Respiratory: Negative.    Cardiovascular: Negative.    Gastrointestinal: Negative.    Endocrine: Negative.    Genitourinary: Negative.    Musculoskeletal: Negative.    Skin: Negative.    Allergic/Immunologic: Negative.    Neurological: Negative.    Hematological: Negative.    Psychiatric/Behavioral: Negative.        Objective   Physical Exam   Constitutional: He is oriented to person, place, and time. He appears well-developed and well-nourished. No distress.   HENT:   Head: Normocephalic and atraumatic.   Right Ear: External ear normal.   Left Ear: External ear normal.   Nose: Nose normal.   Mouth/Throat: Oropharynx is clear and moist. No oropharyngeal exudate.   Eyes: Conjunctivae and EOM are normal. Pupils are equal, round, and reactive to light. Right eye exhibits no discharge. Left eye exhibits no discharge. No scleral icterus.   Neck: Normal range of motion. Neck supple. No JVD present. No tracheal deviation present. No thyromegaly present.   Cardiovascular: Normal rate, regular rhythm, normal heart sounds and intact distal pulses. Exam reveals no gallop and no friction rub.   No murmur heard.  Healed the scar of heart valve surgery in the anterior midline chest.   Pulmonary/Chest: Effort normal and breath sounds normal. No stridor. No respiratory distress. He has no wheezes. He has no rales. He exhibits no tenderness.   Abdominal: Soft. Bowel sounds are normal. He exhibits no distension and no mass. There is no tenderness. There is no rebound and no guarding. No hernia.   Musculoskeletal: Normal range of motion. He exhibits no edema, tenderness or deformity.   Lymphadenopathy:     He has no cervical adenopathy.   Neurological: He is alert and oriented to person, place, and  time. He has normal reflexes. He displays normal reflexes. No cranial nerve deficit or sensory deficit. He exhibits normal muscle tone. Coordination normal.   Skin: Skin is warm. No rash noted. He is not diaphoretic. No erythema. No pallor.   Psychiatric: He has a normal mood and affect. His behavior is normal. Judgment and thought content normal.   Nursing note and vitals reviewed.       Results for orders placed or performed in visit on 02/28/19   T4 & TSH ([facility])   Result Value Ref Range    TSH 2.910 0.270 - 4.200 mIU/mL    T4, Total 5.47 4.50 - 11.70 mcg/dL   T3, Free   Result Value Ref Range    T3, Free 2.9 2.0 - 4.4 pg/mL   T4, Free   Result Value Ref Range    Free T4 1.00 0.93 - 1.70 ng/dL   Thyroglobulin With Anti-TG   Result Value Ref Range    Thyroglobulin Ab <1.0 0.0 - 0.9 IU/mL   Uric Acid   Result Value Ref Range    Uric Acid 9.4 (H) 3.4 - 7.0 mg/dL   Vitamin D 25 Hydroxy   Result Value Ref Range    25 Hydroxy, Vitamin D 25.6 (L) 30.0 - 100.0 ng/ml   Comprehensive Metabolic Panel   Result Value Ref Range    Glucose 159 (H) 65 - 99 mg/dL    BUN 43 (H) 8 - 23 mg/dL    Creatinine 1.70 (H) 0.76 - 1.27 mg/dL    eGFR Non African Am 39 (L) >60 mL/min/1.73    eGFR  Am 48 (L) >60 mL/min/1.73    BUN/Creatinine Ratio 25.3 (H) 7.0 - 25.0    Sodium 142 136 - 145 mmol/L    Potassium 4.8 3.5 - 5.2 mmol/L    Chloride 106 98 - 107 mmol/L    Total CO2 22.5 22.0 - 29.0 mmol/L    Calcium 9.3 8.6 - 10.5 mg/dL    Total Protein 6.7 6.0 - 8.5 g/dL    Albumin 4.60 3.50 - 5.20 g/dL    Globulin 2.1 gm/dL    A/G Ratio 2.2 g/dL    Total Bilirubin 0.5 0.2 - 1.2 mg/dL    Alkaline Phosphatase 55 39 - 117 U/L    AST (SGOT) 20 1 - 40 U/L    ALT (SGPT) 21 1 - 41 U/L   C-Peptide   Result Value Ref Range    C-Peptide 4.1 1.1 - 4.4 ng/mL   Hemoglobin A1c   Result Value Ref Range    Hemoglobin A1C 7.35 (H) 4.80 - 5.60 %   Lipid Panel   Result Value Ref Range    Total Cholesterol 187 0 - 200 mg/dL    Triglycerides 288 (H) 0 - 150  mg/dL    HDL Cholesterol 34 (L) 40 - 60 mg/dL    VLDL Cholesterol 57.6 (H) 5 - 40 mg/dL    LDL Cholesterol  95 0 - 100 mg/dL   PSA DIAGNOSTIC   Result Value Ref Range    PSA 2.010 0.000 - 4.000 ng/mL   Thyroglobulin By IRLANDA   Result Value Ref Range    THYROGLOBULIN BY IRLANDA 8.6 1.4 - 29.2 ng/mL   Cardiovascular Risk Assessment   Result Value Ref Range    Interpretation Note    Diabetes Patient Education   Result Value Ref Range    PDF Image Not applicable    CBC & Differential   Result Value Ref Range    WBC 6.02 3.40 - 10.80 10*3/mm3    RBC 4.81 4.14 - 5.80 10*6/mm3    Hemoglobin 14.9 13.0 - 17.7 g/dL    Hematocrit 46.2 37.5 - 51.0 %    MCV 96.0 79.0 - 97.0 fL    MCH 31.0 26.6 - 33.0 pg    MCHC 32.3 31.5 - 35.7 g/dL    RDW 14.0 12.3 - 15.4 %    Platelets 139 (L) 140 - 450 10*3/mm3    Neutrophil Rel % 63.4 42.7 - 76.0 %    Lymphocyte Rel % 24.9 19.6 - 45.3 %    Monocyte Rel % 7.6 5.0 - 12.0 %    Eosinophil Rel % 3.0 0.3 - 6.2 %    Basophil Rel % 0.8 0.0 - 1.5 %    Neutrophils Absolute 3.81 1.40 - 7.00 10*3/mm3    Lymphocytes Absolute 1.50 0.70 - 3.10 10*3/mm3    Monocytes Absolute 0.46 0.10 - 0.90 10*3/mm3    Eosinophils Absolute 0.18 0.00 - 0.40 10*3/mm3    Basophils Absolute 0.05 0.00 - 0.20 10*3/mm3    Immature Granulocyte Rel % 0.3 0.0 - 0.5 %    Immature Grans Absolute 0.02 0.00 - 0.05 10*3/mm3    nRBC 0.0 0.0 - 0.0 /100 WBC       Assessment/Plan   Diagnoses and all orders for this visit:    Controlled type 2 diabetes mellitus without complication, without long-term current use of insulin (CMS/[facility])  -     T3, Free; Future  -     T4 & TSH ([facility]); Future  -     T4, Free; Future  -     Thyroglobulin With Anti-TG; Future  -     TestT+TestF+SHBG; Future  -     Uric Acid; Future  -     Vitamin D 25 Hydroxy; Future  -     Comprehensive Metabolic Panel; Future  -     C-Peptide; Future  -     Hemoglobin A1c; Future  -     Lipid Panel; Future  -     MicroAlbumin, Urine, Random - Urine, Clean Catch; Future  -      Hemoglobin & Hematocrit, Blood; Future    Benign essential hypertension  -     T3, Free; Future  -     T4 & TSH ([facility]); Future  -     T4, Free; Future  -     Thyroglobulin With Anti-TG; Future  -     TestT+TestF+SHBG; Future  -     Uric Acid; Future  -     Vitamin D 25 Hydroxy; Future  -     Comprehensive Metabolic Panel; Future  -     C-Peptide; Future  -     Hemoglobin A1c; Future  -     Lipid Panel; Future  -     MicroAlbumin, Urine, Random - Urine, Clean Catch; Future  -     Hemoglobin & Hematocrit, Blood; Future    Essential hypertension  -     tadalafil (CIALIS) 5 MG tablet; Take 1 tablet by mouth Daily As Needed for erectile dysfunction.  -     Olmesartan-amLODIPine-HCTZ 40-10-25 MG tablet; Take 1 tablet by mouth Daily.  -     ezetimibe (ZETIA) 10 MG tablet; Take 1 tablet by mouth Daily.  -     ergocalciferol (ERGOCALCIFEROL) 99613 units capsule; Take 1 capsule by mouth 2 (Two) Times a Week.  -     T3, Free; Future  -     T4 & TSH ([facility]); Future  -     T4, Free; Future  -     Thyroglobulin With Anti-TG; Future  -     TestT+TestF+SHBG; Future  -     Uric Acid; Future  -     Vitamin D 25 Hydroxy; Future  -     Comprehensive Metabolic Panel; Future  -     C-Peptide; Future  -     Hemoglobin A1c; Future  -     Lipid Panel; Future  -     MicroAlbumin, Urine, Random - Urine, Clean Catch; Future  -     Hemoglobin & Hematocrit, Blood; Future    Vitamin D deficiency  -     tadalafil (CIALIS) 5 MG tablet; Take 1 tablet by mouth Daily As Needed for erectile dysfunction.  -     Olmesartan-amLODIPine-HCTZ 40-10-25 MG tablet; Take 1 tablet by mouth Daily.  -     ezetimibe (ZETIA) 10 MG tablet; Take 1 tablet by mouth Daily.  -     ergocalciferol (ERGOCALCIFEROL) 35850 units capsule; Take 1 capsule by mouth 2 (Two) Times a Week.  -     T3, Free; Future  -     T4 & TSH ([facility]); Future  -     T4, Free; Future  -     Thyroglobulin With Anti-TG; Future  -     TestT+TestF+SHBG; Future  -     Uric Acid; Future  -      Vitamin D 25 Hydroxy; Future  -     Comprehensive Metabolic Panel; Future  -     C-Peptide; Future  -     Hemoglobin A1c; Future  -     Lipid Panel; Future  -     MicroAlbumin, Urine, Random - Urine, Clean Catch; Future  -     Hemoglobin & Hematocrit, Blood; Future    Hypogonadism in male  -     T3, Free; Future  -     T4 & TSH ([facility]); Future  -     T4, Free; Future  -     Thyroglobulin With Anti-TG; Future  -     TestT+TestF+SHBG; Future  -     Uric Acid; Future  -     Vitamin D 25 Hydroxy; Future  -     Comprehensive Metabolic Panel; Future  -     C-Peptide; Future  -     Hemoglobin A1c; Future  -     Lipid Panel; Future  -     MicroAlbumin, Urine, Random - Urine, Clean Catch; Future  -     Hemoglobin & Hematocrit, Blood; Future    Erectile dysfunction, unspecified erectile dysfunction type  -     T3, Free; Future  -     T4 & TSH ([facility]); Future  -     T4, Free; Future  -     Thyroglobulin With Anti-TG; Future  -     TestT+TestF+SHBG; Future  -     Uric Acid; Future  -     Vitamin D 25 Hydroxy; Future  -     Comprehensive Metabolic Panel; Future  -     C-Peptide; Future  -     Hemoglobin A1c; Future  -     Lipid Panel; Future  -     MicroAlbumin, Urine, Random - Urine, Clean Catch; Future  -     Hemoglobin & Hematocrit, Blood; Future    Benign non-nodular prostatic hyperplasia without lower urinary tract symptoms  -     T3, Free; Future  -     T4 & TSH ([facility]); Future  -     T4, Free; Future  -     Thyroglobulin With Anti-TG; Future  -     TestT+TestF+SHBG; Future  -     Uric Acid; Future  -     Vitamin D 25 Hydroxy; Future  -     Comprehensive Metabolic Panel; Future  -     C-Peptide; Future  -     Hemoglobin A1c; Future  -     Lipid Panel; Future  -     MicroAlbumin, Urine, Random - Urine, Clean Catch; Future  -     Hemoglobin & Hematocrit, Blood; Future    Hyperuricemia  -     tadalafil (CIALIS) 5 MG tablet; Take 1 tablet by mouth Daily As Needed for erectile dysfunction.  -      Olmesartan-amLODIPine-HCTZ 40-10-25 MG tablet; Take 1 tablet by mouth Daily.  -     ezetimibe (ZETIA) 10 MG tablet; Take 1 tablet by mouth Daily.  -     ergocalciferol (ERGOCALCIFEROL) 24582 units capsule; Take 1 capsule by mouth 2 (Two) Times a Week.  -     T3, Free; Future  -     T4 & TSH ([facility]); Future  -     T4, Free; Future  -     Thyroglobulin With Anti-TG; Future  -     TestT+TestF+SHBG; Future  -     Uric Acid; Future  -     Vitamin D 25 Hydroxy; Future  -     Comprehensive Metabolic Panel; Future  -     C-Peptide; Future  -     Hemoglobin A1c; Future  -     Lipid Panel; Future  -     MicroAlbumin, Urine, Random - Urine, Clean Catch; Future  -     Hemoglobin & Hematocrit, Blood; Future    Low testosterone  -     tadalafil (CIALIS) 5 MG tablet; Take 1 tablet by mouth Daily As Needed for erectile dysfunction.  -     Olmesartan-amLODIPine-HCTZ 40-10-25 MG tablet; Take 1 tablet by mouth Daily.  -     ezetimibe (ZETIA) 10 MG tablet; Take 1 tablet by mouth Daily.  -     ergocalciferol (ERGOCALCIFEROL) 69024 units capsule; Take 1 capsule by mouth 2 (Two) Times a Week.  -     T3, Free; Future  -     T4 & TSH ([facility]); Future  -     T4, Free; Future  -     Thyroglobulin With Anti-TG; Future  -     TestT+TestF+SHBG; Future  -     Uric Acid; Future  -     Vitamin D 25 Hydroxy; Future  -     Comprehensive Metabolic Panel; Future  -     C-Peptide; Future  -     Hemoglobin A1c; Future  -     Lipid Panel; Future  -     MicroAlbumin, Urine, Random - Urine, Clean Catch; Future  -     Hemoglobin & Hematocrit, Blood; Future    Other male erectile dysfunction  -     tadalafil (CIALIS) 5 MG tablet; Take 1 tablet by mouth Daily As Needed for erectile dysfunction.  -     Olmesartan-amLODIPine-HCTZ 40-10-25 MG tablet; Take 1 tablet by mouth Daily.  -     ezetimibe (ZETIA) 10 MG tablet; Take 1 tablet by mouth Daily.  -     ergocalciferol (ERGOCALCIFEROL) 34891 units capsule; Take 1 capsule by mouth 2 (Two) Times a Week.  -      T3, Free; Future  -     T4 & TSH ([facility]); Future  -     T4, Free; Future  -     Thyroglobulin With Anti-TG; Future  -     TestT+TestF+SHBG; Future  -     Uric Acid; Future  -     Vitamin D 25 Hydroxy; Future  -     Comprehensive Metabolic Panel; Future  -     C-Peptide; Future  -     Hemoglobin A1c; Future  -     Lipid Panel; Future  -     MicroAlbumin, Urine, Random - Urine, Clean Catch; Future  -     Hemoglobin & Hematocrit, Blood; Future    Dyslipidemia  -     tadalafil (CIALIS) 5 MG tablet; Take 1 tablet by mouth Daily As Needed for erectile dysfunction.  -     Olmesartan-amLODIPine-HCTZ 40-10-25 MG tablet; Take 1 tablet by mouth Daily.  -     ezetimibe (ZETIA) 10 MG tablet; Take 1 tablet by mouth Daily.  -     ergocalciferol (ERGOCALCIFEROL) 71550 units capsule; Take 1 capsule by mouth 2 (Two) Times a Week.  -     T3, Free; Future  -     T4 & TSH ([facility]); Future  -     T4, Free; Future  -     Thyroglobulin With Anti-TG; Future  -     TestT+TestF+SHBG; Future  -     Uric Acid; Future  -     Vitamin D 25 Hydroxy; Future  -     Comprehensive Metabolic Panel; Future  -     C-Peptide; Future  -     Hemoglobin A1c; Future  -     Lipid Panel; Future  -     MicroAlbumin, Urine, Random - Urine, Clean Catch; Future  -     Hemoglobin & Hematocrit, Blood; Future    Hyperglycemia  -     tadalafil (CIALIS) 5 MG tablet; Take 1 tablet by mouth Daily As Needed for erectile dysfunction.  -     Olmesartan-amLODIPine-HCTZ 40-10-25 MG tablet; Take 1 tablet by mouth Daily.  -     ezetimibe (ZETIA) 10 MG tablet; Take 1 tablet by mouth Daily.  -     ergocalciferol (ERGOCALCIFEROL) 00148 units capsule; Take 1 capsule by mouth 2 (Two) Times a Week.  -     T3, Free; Future  -     T4 & TSH ([facility]); Future  -     T4, Free; Future  -     Thyroglobulin With Anti-TG; Future  -     TestT+TestF+SHBG; Future  -     Uric Acid; Future  -     Vitamin D 25 Hydroxy; Future  -     Comprehensive Metabolic Panel; Future  -     C-Peptide; Future  -      Hemoglobin A1c; Future  -     Lipid Panel; Future  -     MicroAlbumin, Urine, Random - Urine, Clean Catch; Future  -     Hemoglobin & Hematocrit, Blood; Future    Other orders  -     TRADJENTA 5 MG tablet tablet; Take 1 tablet by mouth Daily.  -     pioglitazone (ACTOS) 15 MG tablet; Take 1 tablet by mouth Daily.  -     ULORIC 40 MG tablet; Take 1 tablet by mouth Daily.  -     testosterone (TESTIM) 50 MG/5GM (1%) gel gel; Place 100 mg on the skin as directed by provider Daily.  -     BYSTOLIC 20 MG tablet; Take 1 tablet by mouth Daily.      In summary I saw and examined this 78-year-old gentleman for above-mentioned problems.  I reviewed his laboratory evaluation of March 28, 2019 and provided him with a hard copy of it.  His hemoglobin A1c is now 7.35 with a creatinine of 1.7.  I will go ahead and start him on Tradjenta 5 mg daily as well as Actos 15 mg daily.  He will continue rest of his medications and I will see him in 6 months or sooner if needed with laboratory evaluation prior to each office visit.            none

## 2023-07-24 ENCOUNTER — PRIOR AUTHORIZATION (OUTPATIENT)
Dept: ENDOCRINOLOGY | Age: 83
End: 2023-07-24
Payer: MEDICARE

## 2023-07-24 NOTE — TELEPHONE ENCOUNTER
7/24/2023    PA has been initiated in Covermymeds as of today for the Testosterone 50 MG/ 5 GM (1% Gel) all information has been submitted and forward over to the patient insurance company for further review. It is currently still pending a response back from the patient insurance company with an determination.    Key: [id number] - PA Case ID: [id number]

## 2023-08-30 DIAGNOSIS — R79.89 LOW TESTOSTERONE IN MALE: ICD-10-CM

## 2023-08-31 RX ORDER — TESTOSTERONE GEL, 1% 10 MG/G
GEL TRANSDERMAL
Qty: 300 G | Refills: 0 | Status: SHIPPED | OUTPATIENT
Start: 2023-08-31

## 2023-08-31 NOTE — TELEPHONE ENCOUNTER
Rx Refill Note  Requested Prescriptions     Pending Prescriptions Disp Refills    testosterone (ANDROGEL) 50 MG/5GM (1%) gel gel [Pharmacy Med Name: TESTOSTERONE 1%(50MG) GEL 5GM UDT] 300 g      Sig: APPLY 1 PACKET EXTERNALLY TO EACH SHOULDER ONCE DAILY      Last office visit with prescribing clinician: 4/28/2023   Last telemedicine visit with prescribing clinician: Visit date not found   Next office visit with prescribing clinician: 12/29/2023                         Would you like a call back once the refill request has been completed: [] Yes [] No    If the office needs to give you a call back, can they leave a voicemail: [] Yes [] No    Sofia Gaines MA  08/31/23, 13:18 EDT

## 2023-09-01 ENCOUNTER — OFFICE VISIT (OUTPATIENT)
Dept: ENDOCRINOLOGY | Age: 83
End: 2023-09-01
Payer: MEDICARE

## 2023-09-01 VITALS
HEART RATE: 75 BPM | HEIGHT: 70 IN | DIASTOLIC BLOOD PRESSURE: 74 MMHG | WEIGHT: 156 LBS | BODY MASS INDEX: 22.33 KG/M2 | SYSTOLIC BLOOD PRESSURE: 140 MMHG

## 2023-09-01 DIAGNOSIS — N18.31 HYPERTENSIVE KIDNEY DISEASE WITH STAGE 3A CHRONIC KIDNEY DISEASE: ICD-10-CM

## 2023-09-01 DIAGNOSIS — E11.59 TYPE 2 DIABETES MELLITUS WITH VASCULAR DISEASE: Primary | ICD-10-CM

## 2023-09-01 DIAGNOSIS — E78.5 HYPERLIPIDEMIA, UNSPECIFIED HYPERLIPIDEMIA TYPE: ICD-10-CM

## 2023-09-01 DIAGNOSIS — R79.89 LOW TESTOSTERONE: ICD-10-CM

## 2023-09-01 DIAGNOSIS — I12.9 HYPERTENSIVE KIDNEY DISEASE WITH STAGE 3A CHRONIC KIDNEY DISEASE: ICD-10-CM

## 2023-09-01 DIAGNOSIS — E55.9 VITAMIN D DEFICIENCY: ICD-10-CM

## 2023-09-01 NOTE — PROGRESS NOTES
Chief Complaint  Chief Complaint   Patient presents with    Diabetes     Type 2: Pt doesn't use meter, is not up to date on eye exam, no hx of retinopathy or neuropathy.        Subjective          History of Present Illness    Randy Mccann 83 y.o. presents for a follow-up evaluation for type 2 DM     He has been diabetic since 2015     Pt is on the following medications for their DM: NCS low fat diet     He went off Actos 15 mg and Tradjenta 5 mg in 2021    He has not used metformin in the past.      Denies chest pain, shortness of breath, vision changes or numbness and tingling in feet/hands.    Weight gain of 2 lbs since last visit.    Pt does not have a history of DM retinopathy.  Last eye exam was 09/22     Pt does have a history of nephropathy.  Patient is not currently taking ACE/ARB  Creatinine want from 1.86 to 2.67 with last labs in 03/22.  Most recent labs showed Creatinine of 3.25  eGFR went from 35 to 24 with last labs in 03/22.  Most recent labs showed eGFR of 19.  Referral to Nephrology was made - Pt never went to nephrology.     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  He has a hard time finding people to watch them when he has doctor appointments, which is why he never followed up with Nephrology.        Pt does not have neuropathy.     Pt does have a history of CAD.  He had 1 vessel coronary artery bypass and porcine aortic valve replacement at [facility] in 2018.  His cardiologist is Dr. Enrrique Ogden.    Last A1C in 04/23 was 5.30    Last microalbumin in 07/22 was negative          Blood Sugars    Blood glucoses are not checked           Hyperlipidemia     Pt denies any muscle/body aches, chest pain or shortness of breath    Pt is currently taking Zetia 10 mg daily.  He had muscle pain with Crestor in the past.  He has never used Lipitor or pravastatin    Last lipid panel in 04/23 showed Total 149, HDL 50, LDL 82 and Triglycerides  "90            Hypertension with CKD Stage 3a     Pt denies any chest pain, palpitations, shortness of breath, or headache     Current regimen includes nothing  Stopped on his own amlodipine 10 mg daily     Olmesartan- amlodipine - HCTZ 40-10-25 mg daily was stopped due to kidney function and elevated potassium     Home -120/60  HR 80s                  Low Testosterone     He is  and has fathered 2 children.  He is not planning any more children.     Current treatment includes  AndrGel 1% 1 tube on each arm daily     Last labs in 04/23 show total testosterone 388 and free testosterone 6.60     Last PSA on 10/25/22 was 0.769     Last H & H in 04/23 was 13.1 & 39.6                  Vitamin D Deficiency     Current treatment includes 1,000 units daily     Last Vit D level was 118.0 in 04/23              I have reviewed the patient's allergies, medicines, past medical hx, family hx and social hx.    Objective   Vital Signs:   /74   Pulse 75   Ht 177.8 cm (70\")   Wt 70.8 kg (156 lb)   BMI 22.38 kg/mý       Physical Exam   Physical Exam  Constitutional:       General: He is not in acute distress.     Appearance: Normal appearance. He is not diaphoretic.   HENT:      Head: Normocephalic and atraumatic.   Eyes:      General:         Right eye: No discharge.         Left eye: No discharge.   Skin:     General: Skin is warm and dry.   Neurological:      Mental Status: He is alert.   Psychiatric:         Mood and Affect: Mood normal.         Behavior: Behavior normal.                  Results Review:   Hemoglobin A1C   Date Value Ref Range Status   04/28/2023 5.30 4.80 - 5.60 % Final     Comment:     Hemoglobin A1C Ranges:  Increased Risk for Diabetes  5.7% to 6.4%  Diabetes                     >= 6.5%  Diabetic Goal                < 7.0%     08/17/2018 7.2 (H) 4.3 - 5.6 % Final     Comment:     (note)  A1C% Reference Range:  4.3 - 5.6  Normal range  5.7 - 6.4  Pre-diabetic -increased risk for developing " diabetes mellitus.  >=6.5      Diabetic -diagnostic of diabetes mellitus.  Note: For diagnosis of diabetes in individuals without unequivocal   hyperglycemia, results should be confirmed by repeat testing.    Patients with conditions that shorten erythrocyte survival, such as   recovery from acute blood loss, hemolytic anemia, kidney disease,   or the presence of unstable hemogloblins like HbSS, HbCC, and HbSC   may yield falsely decreased HbA1c test results. Iron deficiency may   yield falsely increased HbA1c test results.     Triglycerides   Date Value Ref Range Status   04/28/2023 90 0 - 150 mg/dL Final   01/14/2019 230 (H) <150 mg/dL Final     Comment:     Triglycerides Reference Ranges:  Borderline High: 150-199  High: 200-499  Very High: >500     HDL Cholesterol   Date Value Ref Range Status   04/28/2023 50 40 - 60 mg/dL Final   01/14/2019 30 (L) >40 mg/dL Final     LDL Cholesterol    Date Value Ref Range Status   01/14/2019 102 (H) 0 - 100 mg/dL Final     Comment:       LDL Cholesterol Reference Range  <100     Optimal  100-129  Near optimal/above optimal  130-159  Borderline high  160-189  High  >=190    Very high     LDL Chol Calc (NIH)   Date Value Ref Range Status   04/28/2023 82 0 - 100 mg/dL Final     VLDL Cholesterol   Date Value Ref Range Status   01/14/2019 46 (H) 0 - 30 mg/dL Final     VLDL Cholesterol Juan Daniel   Date Value Ref Range Status   04/28/2023 17 5 - 40 mg/dL Final         Assessment and Plan {CC Problem List  Visit Diagnosis  ROS  Review (Popup)  Health Maintenance  Quality  BestPractice  Medications  SmartSets  SnapShot Encounters  Media :23  Diagnoses and all orders for this visit:    1. 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 Hyperlipidemia,  unspecified hyperlipidemia type  -     CBC Auto Differential  -     Comprehensive Metabolic Panel  -     Lipid Panel    Check labs today  Continue with Zetia      3. Hypertensive kidney disease with stage 3a chronic kidney disease  -     CBC Auto Differential  -     Comprehensive Metabolic Panel    Stable  Continue with current medication regimen  Defer management to PCP        4. Low testosterone  -     CBC Auto Differential  -     Testosterone, Free, Total    Check labs today  Continue with AndrGel 1% 1 tube on each arm daily        5. Vitamin D deficiency  -     Vitamin D,25-Hydroxy    Check labs today  Continue with supplement             No refills needed at this time        Labs today  RTC on 12/29/23 with Dr. Pulido      Follow Up     Patient was given instructions and counseling regarding her condition or for health maintenance advice. Please see specific information pulled into the AVS if appropriate.              Susanna Mccurdy, PAPITO  09/01/23

## 2023-09-06 DIAGNOSIS — F52.21 ERECTILE DYSFUNCTION OF NONORGANIC ORIGIN: ICD-10-CM

## 2023-09-06 DIAGNOSIS — I12.9 BENIGN HYPERTENSION WITH CKD (CHRONIC KIDNEY DISEASE) STAGE IV: ICD-10-CM

## 2023-09-06 DIAGNOSIS — E11.9 TYPE 2 DIABETES MELLITUS WITHOUT COMPLICATION, WITHOUT LONG-TERM CURRENT USE OF INSULIN: ICD-10-CM

## 2023-09-06 DIAGNOSIS — R79.89 LOW TESTOSTERONE: ICD-10-CM

## 2023-09-06 DIAGNOSIS — N18.4 BENIGN HYPERTENSION WITH CKD (CHRONIC KIDNEY DISEASE) STAGE IV: ICD-10-CM

## 2023-09-06 DIAGNOSIS — N18.31 HYPERTENSIVE KIDNEY DISEASE WITH STAGE 3A CHRONIC KIDNEY DISEASE: Primary | ICD-10-CM

## 2023-09-06 DIAGNOSIS — I12.9 HYPERTENSIVE KIDNEY DISEASE WITH STAGE 3A CHRONIC KIDNEY DISEASE: Primary | ICD-10-CM

## 2023-09-06 RX ORDER — FINASTERIDE 5 MG/1
5 TABLET, FILM COATED ORAL DAILY
Qty: 90 TABLET | Refills: 1 | Status: SHIPPED | OUTPATIENT
Start: 2023-09-06

## 2023-09-09 LAB
25(OH)D3+25(OH)D2 SERPL-MCNC: 59.8 NG/ML (ref 30–100)
ALBUMIN SERPL-MCNC: 4.7 G/DL (ref 3.5–5.2)
ALBUMIN/GLOB SERPL: 2.4 G/DL
ALP SERPL-CCNC: 70 U/L (ref 39–117)
ALT SERPL-CCNC: 17 U/L (ref 1–41)
AST SERPL-CCNC: 25 U/L (ref 1–40)
BASOPHILS # BLD AUTO: 0.07 10*3/MM3 (ref 0–0.2)
BASOPHILS NFR BLD AUTO: 1.1 % (ref 0–1.5)
BILIRUB SERPL-MCNC: 0.9 MG/DL (ref 0–1.2)
BUN SERPL-MCNC: 21 MG/DL (ref 8–23)
BUN/CREAT SERPL: 15.9 (ref 7–25)
CALCIUM SERPL-MCNC: 9.6 MG/DL (ref 8.6–10.5)
CHLORIDE SERPL-SCNC: 102 MMOL/L (ref 98–107)
CHOLEST SERPL-MCNC: 175 MG/DL (ref 0–200)
CO2 SERPL-SCNC: 24.6 MMOL/L (ref 22–29)
CREAT SERPL-MCNC: 1.32 MG/DL (ref 0.76–1.27)
EGFRCR SERPLBLD CKD-EPI 2021: 54.2 ML/MIN/1.73
EOSINOPHIL # BLD AUTO: 0.09 10*3/MM3 (ref 0–0.4)
EOSINOPHIL NFR BLD AUTO: 1.4 % (ref 0.3–6.2)
ERYTHROCYTE [DISTWIDTH] IN BLOOD BY AUTOMATED COUNT: 13.6 % (ref 12.3–15.4)
GLOBULIN SER CALC-MCNC: 2 GM/DL
GLUCOSE SERPL-MCNC: 100 MG/DL (ref 65–99)
HBA1C MFR BLD: 5.6 % (ref 4.8–5.6)
HCT VFR BLD AUTO: 43.3 % (ref 37.5–51)
HDLC SERPL-MCNC: 49 MG/DL (ref 40–60)
HGB BLD-MCNC: 14.4 G/DL (ref 13–17.7)
IMM GRANULOCYTES # BLD AUTO: 0.06 10*3/MM3 (ref 0–0.05)
IMM GRANULOCYTES NFR BLD AUTO: 0.9 % (ref 0–0.5)
IMP & REVIEW OF LAB RESULTS: NORMAL
LDLC SERPL CALC-MCNC: 107 MG/DL (ref 0–100)
LYMPHOCYTES # BLD AUTO: 1.09 10*3/MM3 (ref 0.7–3.1)
LYMPHOCYTES NFR BLD AUTO: 17 % (ref 19.6–45.3)
MCH RBC QN AUTO: 32.2 PG (ref 26.6–33)
MCHC RBC AUTO-ENTMCNC: 33.3 G/DL (ref 31.5–35.7)
MCV RBC AUTO: 96.9 FL (ref 79–97)
MONOCYTES # BLD AUTO: 0.41 10*3/MM3 (ref 0.1–0.9)
MONOCYTES NFR BLD AUTO: 6.4 % (ref 5–12)
NEUTROPHILS # BLD AUTO: 4.71 10*3/MM3 (ref 1.7–7)
NEUTROPHILS NFR BLD AUTO: 73.2 % (ref 42.7–76)
PLATELET # BLD AUTO: 172 10*3/MM3 (ref 140–450)
POTASSIUM SERPL-SCNC: 4.8 MMOL/L (ref 3.5–5.2)
PROT SERPL-MCNC: 6.7 G/DL (ref 6–8.5)
RBC # BLD AUTO: 4.47 10*6/MM3 (ref 4.14–5.8)
REPORT: NORMAL
SODIUM SERPL-SCNC: 141 MMOL/L (ref 136–145)
TESTOST FREE SERPL-MCNC: 3.8 PG/ML (ref 6.6–18.1)
TESTOST SERPL-MCNC: 426 NG/DL (ref 264–916)
TRIGL SERPL-MCNC: 102 MG/DL (ref 0–150)
UNABLE TO VOID: NORMAL
VLDLC SERPL CALC-MCNC: 19 MG/DL (ref 5–40)
WBC # BLD AUTO: 6.43 10*3/MM3 (ref 3.4–10.8)

## 2023-09-25 ENCOUNTER — TELEPHONE (OUTPATIENT)
Dept: ENDOCRINOLOGY | Age: 83
End: 2023-09-25

## 2023-10-06 DIAGNOSIS — E79.0 HYPERURICEMIA: ICD-10-CM

## 2023-10-06 DIAGNOSIS — N18.4 TYPE 2 DIABETES MELLITUS WITH STAGE 4 CHRONIC KIDNEY DISEASE, WITHOUT LONG-TERM CURRENT USE OF INSULIN: ICD-10-CM

## 2023-10-06 DIAGNOSIS — R73.9 HYPERGLYCEMIA: ICD-10-CM

## 2023-10-06 DIAGNOSIS — N52.8 OTHER MALE ERECTILE DYSFUNCTION: ICD-10-CM

## 2023-10-06 DIAGNOSIS — E55.9 VITAMIN D DEFICIENCY: ICD-10-CM

## 2023-10-06 DIAGNOSIS — E11.22 TYPE 2 DIABETES MELLITUS WITH STAGE 4 CHRONIC KIDNEY DISEASE, WITHOUT LONG-TERM CURRENT USE OF INSULIN: ICD-10-CM

## 2023-10-06 DIAGNOSIS — R79.89 LOW TESTOSTERONE: ICD-10-CM

## 2023-10-06 DIAGNOSIS — I10 ESSENTIAL HYPERTENSION: ICD-10-CM

## 2023-10-06 DIAGNOSIS — E78.5 DYSLIPIDEMIA: ICD-10-CM

## 2023-10-06 RX ORDER — TADALAFIL 5 MG/1
5 TABLET ORAL DAILY
Qty: 90 TABLET | Refills: 1 | Status: SHIPPED | OUTPATIENT
Start: 2023-10-06

## 2023-10-06 NOTE — TELEPHONE ENCOUNTER
Rx Refill Note  Requested Prescriptions     Pending Prescriptions Disp Refills    tadalafil (CIALIS) 5 MG tablet [Pharmacy Med Name: TADALAFIL 5MG TABLETS] 90 tablet 1     Sig: TAKE 1 TABLET BY MOUTH DAILY AS NEEDED      Last office visit with prescribing clinician: 4/28/2023   Last telemedicine visit with prescribing clinician: Visit date not found   Next office visit with prescribing clinician: 12/29/2023                         Would you like a call back once the refill request has been completed: [] Yes [] No    If the office needs to give you a call back, can they leave a voicemail: [] Yes [] No    Enid Rankin  10/06/23, 09:16 EDT

## 2023-10-25 DIAGNOSIS — R79.89 LOW TESTOSTERONE IN MALE: ICD-10-CM

## 2023-10-26 RX ORDER — TESTOSTERONE GEL, 1% 10 MG/G
GEL TRANSDERMAL
Qty: 300 G | Refills: 1 | Status: SHIPPED | OUTPATIENT
Start: 2023-10-26

## 2023-10-26 NOTE — TELEPHONE ENCOUNTER
Rx Refill Note  Requested Prescriptions     Pending Prescriptions Disp Refills    testosterone (ANDROGEL) 50 MG/5GM (1%) gel gel [Pharmacy Med Name: TESTOSTERONE 1%(50MG) GEL 5GM UDT] 300 g 1     Sig: APPLY 1 PACKET EXTERNALLY TO EACH SHOULDER ONCE DAILY      Last office visit with prescribing clinician: 4/28/2023   Last telemedicine visit with prescribing clinician: Visit date not found   Next office visit with prescribing clinician: 12/29/2023                         Would you like a call back once the refill request has been completed: [] Yes [] No    If the office needs to give you a call back, can they leave a voicemail: [] Yes [] No    Enid Rankin  10/26/23, 08:01 EDT

## 2023-12-26 DIAGNOSIS — E78.5 DYSLIPIDEMIA: ICD-10-CM

## 2023-12-26 DIAGNOSIS — I10 ESSENTIAL HYPERTENSION: ICD-10-CM

## 2023-12-26 DIAGNOSIS — R79.89 LOW TESTOSTERONE: ICD-10-CM

## 2023-12-26 DIAGNOSIS — E79.0 HYPERURICEMIA: ICD-10-CM

## 2023-12-26 DIAGNOSIS — E55.9 VITAMIN D DEFICIENCY: ICD-10-CM

## 2023-12-26 DIAGNOSIS — R73.9 HYPERGLYCEMIA: ICD-10-CM

## 2023-12-26 DIAGNOSIS — N52.8 OTHER MALE ERECTILE DYSFUNCTION: ICD-10-CM

## 2023-12-26 RX ORDER — EZETIMIBE 10 MG/1
10 TABLET ORAL DAILY
Qty: 90 TABLET | Refills: 1 | Status: SHIPPED | OUTPATIENT
Start: 2023-12-26

## 2023-12-26 NOTE — TELEPHONE ENCOUNTER
Rx Refill Note  Requested Prescriptions     Pending Prescriptions Disp Refills    ezetimibe (ZETIA) 10 MG tablet [Pharmacy Med Name: EZETIMIBE 10MG TABLETS] 30 tablet 6     Sig: TAKE 1 TABLET BY MOUTH DAILY      Last office visit with prescribing clinician: 4/28/2023   Last telemedicine visit with prescribing clinician: Visit date not found   Next office visit with prescribing clinician: 12/29/2023                         Would you like a call back once the refill request has been completed: [] Yes [] No    If the office needs to give you a call back, can they leave a voicemail: [] Yes [] No    Diana Rankin MA  12/26/23, 09:26 EST

## 2023-12-28 DIAGNOSIS — E11.22 TYPE 2 DIABETES MELLITUS WITH STAGE 4 CHRONIC KIDNEY DISEASE, WITHOUT LONG-TERM CURRENT USE OF INSULIN: ICD-10-CM

## 2023-12-28 DIAGNOSIS — R79.89 LOW TESTOSTERONE: ICD-10-CM

## 2023-12-28 DIAGNOSIS — N18.4 TYPE 2 DIABETES MELLITUS WITH STAGE 4 CHRONIC KIDNEY DISEASE, WITHOUT LONG-TERM CURRENT USE OF INSULIN: ICD-10-CM

## 2023-12-28 DIAGNOSIS — E79.0 HYPERURICEMIA: ICD-10-CM

## 2023-12-28 DIAGNOSIS — N52.8 OTHER MALE ERECTILE DYSFUNCTION: ICD-10-CM

## 2023-12-28 DIAGNOSIS — I10 ESSENTIAL HYPERTENSION: ICD-10-CM

## 2023-12-28 DIAGNOSIS — R73.9 HYPERGLYCEMIA: ICD-10-CM

## 2023-12-28 DIAGNOSIS — E78.5 DYSLIPIDEMIA: ICD-10-CM

## 2023-12-28 DIAGNOSIS — E55.9 VITAMIN D DEFICIENCY: ICD-10-CM

## 2023-12-28 RX ORDER — TADALAFIL 5 MG/1
5 TABLET ORAL DAILY PRN
Qty: 30 TABLET | Refills: 1 | Status: SHIPPED | OUTPATIENT
Start: 2023-12-28

## 2023-12-28 NOTE — TELEPHONE ENCOUNTER
Rx Refill Note  Requested Prescriptions     Pending Prescriptions Disp Refills    tadalafil (CIALIS) 5 MG tablet [Pharmacy Med Name: TADALAFIL 5MG TABLETS] 30 tablet 1     Sig: TAKE 1 TABLET BY MOUTH DAILY AS NEEDED      Last office visit with prescribing clinician: 4/28/2023   Last telemedicine visit with prescribing clinician: Visit date not found   Next office visit with prescribing clinician: 12/29/2023                         Would you like a call back once the refill request has been completed: [] Yes [] No    If the office needs to give you a call back, can they leave a voicemail: [] Yes [] No    Diana Rankin MA  12/28/23, 08:05 EST

## 2023-12-29 ENCOUNTER — OFFICE VISIT (OUTPATIENT)
Dept: ENDOCRINOLOGY | Age: 83
End: 2023-12-29
Payer: MEDICARE

## 2023-12-29 VITALS
TEMPERATURE: 97 F | SYSTOLIC BLOOD PRESSURE: 148 MMHG | BODY MASS INDEX: 22.42 KG/M2 | HEIGHT: 70 IN | DIASTOLIC BLOOD PRESSURE: 62 MMHG | WEIGHT: 156.6 LBS | HEART RATE: 60 BPM | OXYGEN SATURATION: 98 %

## 2023-12-29 DIAGNOSIS — E55.9 VITAMIN D DEFICIENCY: ICD-10-CM

## 2023-12-29 DIAGNOSIS — Z78.9 STATIN INTOLERANCE: ICD-10-CM

## 2023-12-29 DIAGNOSIS — N40.0 BENIGN PROSTATIC HYPERPLASIA WITHOUT LOWER URINARY TRACT SYMPTOMS: ICD-10-CM

## 2023-12-29 DIAGNOSIS — E78.5 HYPERLIPIDEMIA, UNSPECIFIED HYPERLIPIDEMIA TYPE: ICD-10-CM

## 2023-12-29 DIAGNOSIS — I10 ESSENTIAL HYPERTENSION: ICD-10-CM

## 2023-12-29 DIAGNOSIS — E11.21 TYPE 2 DIABETES MELLITUS WITH NEPHROPATHY: Primary | ICD-10-CM

## 2023-12-29 DIAGNOSIS — Z95.3 HISTORY OF AORTIC VALVE REPLACEMENT WITH PORCINE VALVE: ICD-10-CM

## 2023-12-29 DIAGNOSIS — R79.89 LOW TESTOSTERONE: ICD-10-CM

## 2023-12-29 DIAGNOSIS — I25.10 CORONARY ARTERY DISEASE INVOLVING NATIVE CORONARY ARTERY OF NATIVE HEART WITHOUT ANGINA PECTORIS: ICD-10-CM

## 2023-12-29 PROCEDURE — 3077F SYST BP >= 140 MM HG: CPT | Performed by: INTERNAL MEDICINE

## 2023-12-29 PROCEDURE — 99214 OFFICE O/P EST MOD 30 MIN: CPT | Performed by: INTERNAL MEDICINE

## 2023-12-29 PROCEDURE — 3078F DIAST BP <80 MM HG: CPT | Performed by: INTERNAL MEDICINE

## 2023-12-29 NOTE — PROGRESS NOTES
Subjective   Randy Mccann is a 83 y.o. male.     History of Present Illness     He has known diabetes mellitus since 2015.  He is on diet alone.  He went off Actos 15 mg and Tradjenta 5 mg when he changed his diet in 6/22.  He has not used metformin in the past.  He does not check his blood sugars at home.  He has no weight change since 9/23.  His last meal was last night.  Hemoglobin A1c done in December 2023 is 5.6%.     His last eye examination was in 9/23.  He has macular degeneration.  He denies blurry vision.  He has azotemia with serum creatinine ranging from 1.46-3.25 since 2021.  He did not schedule an appointment with nephrology.  Urine microalbumin done in 7/22 is normal.  He denies numbness, tingling or burning in his hands or feet.     He has hypertension and coronary artery disease.  He had 1 vessel coronary artery bypass and porcine aortic valve replacement at [facility] in 2018.  He went off Tribenzor after he lost weight.  He denies history of heart attack or stroke.  He denies chest pain or shortness of breath.  His cardiologist is Dr. Enrrique Ogden.      He has vitamin D deficiency and is on vit D3 1000 units/day.  He has occasional diarrhea.  He has no history of celiac disease.  25 hydroxy vitamin D done in December 2023 is normal at 57.9 ng/ML.     He has low testosterone and has been on AndroGel 1% 1 tube on each arm for several years.  He last used it yesterday.  He does not know the cause of the low testosterone.  He is  and has fathered 2 children.  He is not planning any more children.  He occasionally misses using testosterone.  Testosterone done in December 2023 is normal at 283 ng/ML.     He denies headaches, heat or cold intolerance, changes in shoes or ring size, easy bruising, or muscle weakness.     He has history of enlarged prostate and is on Cialis 5 mg/day and finasteride 5 mg once a day. He denies difficulty initiating urination, dysuria, hesitancy, or retention.  PSA  "done in December 2023 is normal at 0.303.     He has hyperlipidemia and is on Zetia 10 mg/day.  He had muscle pain with Crestor in the past.  He has never used Lipitor or pravastatin.  Lipid panel done in December 2023 are as follows: Cholesterol 155.  HDL 54.  LDL 86.  Triglycerides 80.       The following portions of the patient's history were reviewed and updated as appropriate: allergies, current medications, past family history, past medical history, past social history, past surgical history, and problem list.    Review of Systems   Eyes:  Negative for visual disturbance.   Respiratory:  Negative for shortness of breath and wheezing.    Cardiovascular:  Negative for chest pain and palpitations.   Gastrointestinal: Negative.    Genitourinary: Negative.    Musculoskeletal:  Negative for myalgias.   Neurological:  Negative for numbness and headaches.     Vitals:    12/29/23 1230   BP: 148/62   Pulse: 60   Temp: 97 °F (36.1 °C)   TempSrc: Temporal   SpO2: 98%   Weight: 71 kg (156 lb 9.6 oz)   Height: 177.8 cm (70\")      Objective   Physical Exam  Results Encounter on 11/14/2023   Component Date Value Ref Range Status    Glucose 12/15/2023 99  65 - 99 mg/dL Final    BUN 12/15/2023 25 (H)  8 - 23 mg/dL Final    Creatinine 12/15/2023 1.39 (H)  0.76 - 1.27 mg/dL Final    EGFR Result 12/15/2023 50.9 (L)  >60.0 mL/min/1.73 Final    Comment: GFR Normal >60  Chronic Kidney Disease <60  Kidney Failure <15  The GFR formula is only valid for adults with stable renal  function between ages 18 and 70.      BUN/Creatinine Ratio 12/15/2023 18.0  7.0 - 25.0 Final    Sodium 12/15/2023 141  136 - 145 mmol/L Final    Potassium 12/15/2023 4.7  3.5 - 5.2 mmol/L Final    Chloride 12/15/2023 103  98 - 107 mmol/L Final    Total CO2 12/15/2023 26.9  22.0 - 29.0 mmol/L Final    Calcium 12/15/2023 9.9  8.6 - 10.5 mg/dL Final    Total Protein 12/15/2023 6.5  6.0 - 8.5 g/dL Final    Albumin 12/15/2023 4.6  3.5 - 5.2 g/dL Final    Globulin " 12/15/2023 1.9  gm/dL Final    A/G Ratio 12/15/2023 2.4  g/dL Final    Total Bilirubin 12/15/2023 1.0  0.0 - 1.2 mg/dL Final    Alkaline Phosphatase 12/15/2023 63  39 - 117 U/L Final    AST (SGOT) 12/15/2023 25  1 - 40 U/L Final    ALT (SGPT) 12/15/2023 17  1 - 41 U/L Final    25 Hydroxy, Vitamin D 12/15/2023 57.9  30.0 - 100.0 ng/ml Final    Comment: Reference Range for Total Vitamin D 25(OH)  Deficiency <20.0 ng/mL  Insufficiency 21-29 ng/mL  Sufficiency  ng/mL  Toxicity >100 ng/ml      Hemoglobin A1C 12/15/2023 5.60  4.80 - 5.60 % Final    Comment: Hemoglobin A1C Ranges:  Increased Risk for Diabetes  5.7% to 6.4%  Diabetes                     >= 6.5%  Diabetic Goal                < 7.0%      Total Cholesterol 12/15/2023 155  0 - 200 mg/dL Final    Comment: Cholesterol Reference Ranges  (U.S. Department of Health and Human Services ATP III  Classifications)  Desirable          <200 mg/dL  Borderline High    200-239 mg/dL  High Risk          >240 mg/dL  Triglyceride Reference Ranges  (U.S. Department of Health and Human Services ATP III  Classifications)  Normal           <150 mg/dL  Borderline High  150-199 mg/dL  High             200-499 mg/dL  Very High        >500 mg/dL  HDL Reference Ranges  (U.S. Department of Health and Human Services ATP III  Classifications)  Low     <40 mg/dl (major risk factor for CHD)  High    >60 mg/dl ('negative' risk factor for CHD)  LDL Reference Ranges  (U.S. Department of Health and Human Services ATP III  Classifications)  Optimal          <100 mg/dL  Near Optimal     100-129 mg/dL  Borderline High  130-159 mg/dL  High             160-189 mg/dL  Very High        >189 mg/dL      Triglycerides 12/15/2023 80  0 - 150 mg/dL Final    HDL Cholesterol 12/15/2023 54  40 - 60 mg/dL Final    VLDL Cholesterol Juan Daniel 12/15/2023 15  5 - 40 mg/dL Final    LDL Chol Calc (NIH) 12/15/2023 86  0 - 100 mg/dL Final    TSH 12/15/2023 2.350  0.270 - 4.200 uIU/mL Final    Testosterone, Total  12/15/2023 283  264 - 916 ng/dL Final    Comment: Adult male reference interval is based on a population of  healthy nonobese males (BMI <30) between 19 and 39 years old.  francia Sommer.al. JCEM 2017,102;3206-2166. PMID: [id number].      Testosterone, Free 12/15/2023 4.75 (L)  5.00 - 21.00 ng/dL Final    Testosterone, Free % 12/15/2023 1.68  1.50 - 4.20 % Final    WBC 12/15/2023 6.02  3.40 - 10.80 10*3/mm3 Final    RBC 12/15/2023 4.45  4.14 - 5.80 10*6/mm3 Final    Hemoglobin 12/15/2023 14.3  13.0 - 17.7 g/dL Final    Hematocrit 12/15/2023 42.5  37.5 - 51.0 % Final    MCV 12/15/2023 95.5  79.0 - 97.0 fL Final    MCH 12/15/2023 32.1  26.6 - 33.0 pg Final    MCHC 12/15/2023 33.6  31.5 - 35.7 g/dL Final    RDW 12/15/2023 12.5  12.3 - 15.4 % Final    Platelets 12/15/2023 157  140 - 450 10*3/mm3 Final    Neutrophil Rel % 12/15/2023 71.3  42.7 - 76.0 % Final    Lymphocyte Rel % 12/15/2023 18.6 (L)  19.6 - 45.3 % Final    Monocyte Rel % 12/15/2023 7.3  5.0 - 12.0 % Final    Eosinophil Rel % 12/15/2023 1.8  0.3 - 6.2 % Final    Basophil Rel % 12/15/2023 0.8  0.0 - 1.5 % Final    Neutrophils Absolute 12/15/2023 4.29  1.70 - 7.00 10*3/mm3 Final    Lymphocytes Absolute 12/15/2023 1.12  0.70 - 3.10 10*3/mm3 Final    Monocytes Absolute 12/15/2023 0.44  0.10 - 0.90 10*3/mm3 Final    Eosinophils Absolute 12/15/2023 0.11  0.00 - 0.40 10*3/mm3 Final    Basophils Absolute 12/15/2023 0.05  0.00 - 0.20 10*3/mm3 Final    Immature Granulocyte Rel % 12/15/2023 0.2  0.0 - 0.5 % Final    Immature Grans Absolute 12/15/2023 0.01  0.00 - 0.05 10*3/mm3 Final    nRBC 12/15/2023 0.0  0.0 - 0.2 /100 WBC Final    PSA 12/15/2023 0.303  0.000 - 4.000 ng/mL Final    Comment: Results may be falsely decreased if patient taking Biotin.  Testing Method: Roche [employer]ostics Electrochemiluminescence  Immunoassay(ECLIA)  Values obtained with different assay methods or kits cannot  be used interchangeably.      Interpretation 12/15/2023 Note   Final     Supplemental report is available.     Assessment & Plan   Diagnoses and all orders for this visit:    1. Type 2 diabetes mellitus with nephropathy (Primary)    2. Essential hypertension  -     Microalbumin / Creatinine Urine Ratio - Urine, Clean Catch    3. Coronary artery disease involving native coronary artery of native heart without angina pectoris    4. Vitamin D deficiency    5. Low testosterone    6. Benign prostatic hyperplasia without lower urinary tract symptoms    7. Hyperlipidemia, unspecified hyperlipidemia type    8. Statin intolerance    9. History of aortic valve replacement with porcine valve      Continue no concentrated sweet low-fat diet.    Will defer follow-up of hypertension, coronary artery disease and aortic valve replacement to PCP.    Continue vitamin D3 1000 units/day.    Continue AndroGel 1% 1 tube in each arm daily.  Continue Cialis 5 mg/day and finasteride 5 mg/day.    Continue Zetia 10 mg/day.    RTC 3 mos with MARC Mccurdy NP and with me in 6 mos

## 2023-12-30 LAB
ALBUMIN/CREAT UR: <15 MG/G CREAT (ref 0–29)
CREAT UR-MCNC: 20.3 MG/DL
MICROALBUMIN UR-MCNC: <3 UG/ML

## 2023-12-30 NOTE — PROGRESS NOTES
Normal urine microalbumin.  Copy of labs sent to patient through [employer].  Send copy of labs to Esther Cisneros NP

## 2024-01-02 ENCOUNTER — PRIOR AUTHORIZATION (OUTPATIENT)
Dept: ENDOCRINOLOGY | Age: 84
End: 2024-01-02
Payer: MEDICARE

## 2024-01-29 RX ORDER — MELATONIN
1000 DAILY
Qty: 90 TABLET | Refills: 3 | Status: SHIPPED | OUTPATIENT
Start: 2024-01-29

## 2024-01-29 NOTE — TELEPHONE ENCOUNTER
Rx Refill Note  Requested Prescriptions     Pending Prescriptions Disp Refills    cholecalciferol (VITAMIN D3) 25 MCG (1000 UT) tablet [Pharmacy Med Name: VITAMIN D3 1,000 UNIT TABLETS] 100 tablet 2     Sig: TAKE 1 TABLET BY MOUTH DAILY. START IN JUNE 2023      Last office visit with prescribing clinician: 12/29/2023   Last telemedicine visit with prescribing clinician: Visit date not found   Next office visit with prescribing clinician: 6/28/2024                         Would you like a call back once the refill request has been completed: [] Yes [] No    If the office needs to give you a call back, can they leave a voicemail: [] Yes [] No    Enid Rankin  01/29/24, 08:01 EST

## 2024-02-27 DIAGNOSIS — I12.9 BENIGN HYPERTENSION WITH CKD (CHRONIC KIDNEY DISEASE) STAGE IV: ICD-10-CM

## 2024-02-27 DIAGNOSIS — N18.4 BENIGN HYPERTENSION WITH CKD (CHRONIC KIDNEY DISEASE) STAGE IV: ICD-10-CM

## 2024-02-27 RX ORDER — FINASTERIDE 5 MG/1
5 TABLET, FILM COATED ORAL DAILY
Qty: 90 TABLET | Refills: 2 | Status: SHIPPED | OUTPATIENT
Start: 2024-02-27

## 2024-02-27 NOTE — TELEPHONE ENCOUNTER
Rx Refill Note  Requested Prescriptions     Pending Prescriptions Disp Refills    finasteride (PROSCAR) 5 MG tablet [Pharmacy Med Name: FINASTERIDE 5MG TABLETS] 90 tablet 1     Sig: TAKE 1 TABLET BY MOUTH DAILY      Last office visit with prescribing clinician: 12/29/2023   Last telemedicine visit with prescribing clinician: Visit date not found   Next office visit with prescribing clinician: 6/28/2024                         Would you like a call back once the refill request has been completed: [] Yes [] No    If the office needs to give you a call back, can they leave a voicemail: [] Yes [] No    Enid Rankin  02/27/24, 07:48 EST

## 2024-03-22 DIAGNOSIS — R79.89 LOW TESTOSTERONE IN MALE: ICD-10-CM

## 2024-03-25 RX ORDER — TESTOSTERONE GEL, 1% 10 MG/G
GEL TRANSDERMAL
Qty: 300 G | Refills: 0 | Status: SHIPPED | OUTPATIENT
Start: 2024-03-25

## 2024-03-25 NOTE — TELEPHONE ENCOUNTER
Rx Refill Note  Requested Prescriptions     Pending Prescriptions Disp Refills    testosterone (ANDROGEL) 50 MG/5GM (1%) gel gel [Pharmacy Med Name: TESTOSTERONE 1%(50MG) GEL 5GM UDT] 300 g 0     Sig: APPLY 1 PACKET EXTERNALLY TO EACH SHOULDER ONCE DAILY      Last office visit with prescribing clinician: 12/29/2023   Last telemedicine visit with prescribing clinician: Visit date not found   Next office visit with prescribing clinician: 6/28/2024                         Would you like a call back once the refill request has been completed: [] Yes [] No    If the office needs to give you a call back, can they leave a voicemail: [] Yes [] No    Enid Rankin  03/25/24, 07:56 EDT

## 2024-04-12 ENCOUNTER — OFFICE VISIT (OUTPATIENT)
Dept: ENDOCRINOLOGY | Age: 84
End: 2024-04-12
Payer: MEDICARE

## 2024-04-12 VITALS
TEMPERATURE: 95.2 F | OXYGEN SATURATION: 99 % | HEIGHT: 70 IN | HEART RATE: 91 BPM | SYSTOLIC BLOOD PRESSURE: 142 MMHG | WEIGHT: 160 LBS | DIASTOLIC BLOOD PRESSURE: 84 MMHG | BODY MASS INDEX: 22.9 KG/M2

## 2024-04-12 DIAGNOSIS — N18.31 HYPERTENSIVE KIDNEY DISEASE WITH STAGE 3A CHRONIC KIDNEY DISEASE: ICD-10-CM

## 2024-04-12 DIAGNOSIS — I12.9 HYPERTENSIVE KIDNEY DISEASE WITH STAGE 3A CHRONIC KIDNEY DISEASE: ICD-10-CM

## 2024-04-12 DIAGNOSIS — R79.89 LOW TESTOSTERONE: ICD-10-CM

## 2024-04-12 DIAGNOSIS — E55.9 VITAMIN D DEFICIENCY: ICD-10-CM

## 2024-04-12 DIAGNOSIS — E11.59 TYPE 2 DIABETES MELLITUS WITH VASCULAR DISEASE: Primary | ICD-10-CM

## 2024-04-12 DIAGNOSIS — E78.5 HYPERLIPIDEMIA, UNSPECIFIED HYPERLIPIDEMIA TYPE: ICD-10-CM

## 2024-04-12 NOTE — PROGRESS NOTES
Chief Complaint  Chief Complaint   Patient presents with    Diabetes       Subjective          History of Present Illness    Randy Mccann 83 y.o. presents for a follow-up evaluation for type 2 DM     He has been diabetic since 2015     Pt is on the following medications for their DM: NCS low fat diet     He went off Actos 15 mg and Tradjenta 5 mg in 2021    He has not used metformin in the past.       Denies chest pain, shortness of breath, vision changes or numbness and tingling in feet/hands.    Weight gain of 4 lbs since last visit.    Pt denies a history of diabetic retinopathy.  Last eye exam was 09/23  He has macular degeneration    Pt does have a history of nephropathy.  Patient is not currently taking ACE/ARB  Referred to Nephrology, but never went        Pt does not have neuropathy.     Pt does have a history of CAD - s/p 1 vessel coronary artery bypass and porcine aortic valve replacement at [facility] in 2018.    Last A1C in 12/23 was 5.6    Last microalbumin in 12/23 was negative          Blood Sugars    Blood glucoses are not checked            Hyperlipidemia     Pt is currently taking Zetia 10 mg daily  He had muscle pain with Crestor in the past.  He has never used Lipitor or pravastatin    Last lipid panel in 12/23 showed Total 155, HDL 54, LDL 86 and Triglycerides 80            Hypertension with CKD Stage 3a     Pt denies any chest pain, palpitations, shortness of breath, or headache     Current regimen includes nothing  Stopped on his own amlodipine 10 mg daily     Olmesartan- amlodipine - HCTZ 40-10-25 mg daily was stopped due to kidney function and elevated potassium              Low Testosterone    He is  and has fathered 2 children.  He is not planning any more children.     Current treatment includes  AndrGel 1% 1 tube on each arm daily - pt takes every other day do to skin irritation if takes daily    Last labs in 12/23 show total testosterone 283 and free testosterone 4.75    Last PSA  "on 12/15/23 was 0.303    Last H & H in 12/23 was 14.3 & 42.5               Vitamin D Deficiency     Current treatment includes 1,000 units daily     Last Vit D level was 57.9 in 12/23            I have reviewed the patient's allergies, medicines, past medical hx, family hx and social hx.    Objective   Vital Signs:   /84   Pulse 91   Temp 95.2 °F (35.1 °C) (Temporal)   Ht 177.8 cm (70\")   Wt 72.6 kg (160 lb)   SpO2 99%   BMI 22.96 kg/m²       Physical Exam   Physical Exam  Constitutional:       General: He is not in acute distress.     Appearance: Normal appearance. He is not diaphoretic.   HENT:      Head: Normocephalic and atraumatic.   Eyes:      General:         Right eye: No discharge.         Left eye: No discharge.   Skin:     General: Skin is warm and dry.   Neurological:      Mental Status: He is alert.   Psychiatric:         Mood and Affect: Mood normal.         Behavior: Behavior normal.                    Results Review:   Hemoglobin A1C   Date Value Ref Range Status   12/15/2023 5.60 4.80 - 5.60 % Final     Comment:     Hemoglobin A1C Ranges:  Increased Risk for Diabetes  5.7% to 6.4%  Diabetes                     >= 6.5%  Diabetic Goal                < 7.0%     08/17/2018 7.2 (H) 4.3 - 5.6 % Final     Comment:     (note)  A1C% Reference Range:  4.3 - 5.6  Normal range  5.7 - 6.4  Pre-diabetic -increased risk for developing diabetes mellitus.  >=6.5      Diabetic -diagnostic of diabetes mellitus.  Note: For diagnosis of diabetes in individuals without unequivocal   hyperglycemia, results should be confirmed by repeat testing.    Patients with conditions that shorten erythrocyte survival, such as   recovery from acute blood loss, hemolytic anemia, kidney disease,   or the presence of unstable hemogloblins like HbSS, HbCC, and HbSC   may yield falsely decreased HbA1c test results. Iron deficiency may   yield falsely increased HbA1c test results.     Triglycerides   Date Value Ref Range Status "   12/15/2023 80 0 - 150 mg/dL Final   01/14/2019 230 (H) <150 mg/dL Final     Comment:     Triglycerides Reference Ranges:  Borderline High: 150-199  High: 200-499  Very High: >500     HDL Cholesterol   Date Value Ref Range Status   12/15/2023 54 40 - 60 mg/dL Final   01/14/2019 30 (L) >40 mg/dL Final     LDL Cholesterol    Date Value Ref Range Status   01/14/2019 102 (H) 0 - 100 mg/dL Final     Comment:       LDL Cholesterol Reference Range  <100     Optimal  100-129  Near optimal/above optimal  130-159  Borderline high  160-189  High  >=190    Very high     LDL Chol Calc (NIH)   Date Value Ref Range Status   12/15/2023 86 0 - 100 mg/dL Final     VLDL Cholesterol   Date Value Ref Range Status   01/14/2019 46 (H) 0 - 30 mg/dL Final     VLDL Cholesterol Juan Daniel   Date Value Ref Range Status   12/15/2023 15 5 - 40 mg/dL Final         Assessment and Plan {CC Problem List  Visit Diagnosis  ROS  Review (Popup)  Health Maintenance  Quality  BestPractice  Medications  SmartSets  SnapShot Encounters  Media :23  Diagnoses and all orders for this visit:    1. 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 Hyperlipidemia, unspecified hyperlipidemia type  -     Comprehensive Metabolic Panel  -     Lipid Panel  -     CBC Auto Differential    Check labs today  Continue with Zetia      3. Hypertensive kidney disease with stage 3a chronic kidney disease  -     Comprehensive Metabolic Panel  -     CBC Auto Differential    Stable  Continue with current medication regimen  Defer management to PCP       4. Low testosterone  -     CBC Auto Differential  -     Testosterone, Free, Total    Check labs today  Continue with AndrGel 1% 1 tube on each arm every other day - pt takes every other day do to skin irritation if takes daily      5. Vitamin D deficiency    Check labs  today  Continue with supplement             Labs today  RTC on 06/28/24 with Dr. Pulido      Follow Up     Patient was given instructions and counseling regarding her condition or for health maintenance advice. Please see specific information pulled into the AVS if appropriate.                Susanna Mccurdy, PAPITO  04/12/24

## 2024-04-15 ENCOUNTER — TELEPHONE (OUTPATIENT)
Dept: ENDOCRINOLOGY | Age: 84
End: 2024-04-15
Payer: MEDICARE

## 2024-04-15 LAB
ALBUMIN SERPL-MCNC: 4.6 G/DL (ref 3.5–5.2)
ALBUMIN/GLOB SERPL: 2.3 G/DL
ALP SERPL-CCNC: 78 U/L (ref 39–117)
ALT SERPL-CCNC: 25 U/L (ref 1–41)
AST SERPL-CCNC: 30 U/L (ref 1–40)
BASOPHILS # BLD AUTO: 0.04 10*3/MM3 (ref 0–0.2)
BASOPHILS NFR BLD AUTO: 0.7 % (ref 0–1.5)
BILIRUB SERPL-MCNC: 1.1 MG/DL (ref 0–1.2)
BUN SERPL-MCNC: 25 MG/DL (ref 8–23)
BUN/CREAT SERPL: 19.2 (ref 7–25)
CALCIUM SERPL-MCNC: 10.2 MG/DL (ref 8.6–10.5)
CHLORIDE SERPL-SCNC: 103 MMOL/L (ref 98–107)
CHOLEST SERPL-MCNC: 149 MG/DL (ref 0–200)
CO2 SERPL-SCNC: 25 MMOL/L (ref 22–29)
CREAT SERPL-MCNC: 1.3 MG/DL (ref 0.76–1.27)
EGFRCR SERPLBLD CKD-EPI 2021: 55.2 ML/MIN/1.73
EOSINOPHIL # BLD AUTO: 0.1 10*3/MM3 (ref 0–0.4)
EOSINOPHIL NFR BLD AUTO: 1.8 % (ref 0.3–6.2)
ERYTHROCYTE [DISTWIDTH] IN BLOOD BY AUTOMATED COUNT: 12.3 % (ref 12.3–15.4)
GLOBULIN SER CALC-MCNC: 2 GM/DL
GLUCOSE SERPL-MCNC: 100 MG/DL (ref 65–99)
HBA1C MFR BLD: 5.7 % (ref 4.8–5.6)
HCT VFR BLD AUTO: 39.8 % (ref 37.5–51)
HDLC SERPL-MCNC: 56 MG/DL (ref 40–60)
HGB BLD-MCNC: 12.9 G/DL (ref 13–17.7)
IMM GRANULOCYTES # BLD AUTO: 0.01 10*3/MM3 (ref 0–0.05)
IMM GRANULOCYTES NFR BLD AUTO: 0.2 % (ref 0–0.5)
IMP & REVIEW OF LAB RESULTS: NORMAL
LDLC SERPL CALC-MCNC: 78 MG/DL (ref 0–100)
LYMPHOCYTES # BLD AUTO: 1.03 10*3/MM3 (ref 0.7–3.1)
LYMPHOCYTES NFR BLD AUTO: 18.4 % (ref 19.6–45.3)
MCH RBC QN AUTO: 30.9 PG (ref 26.6–33)
MCHC RBC AUTO-ENTMCNC: 32.4 G/DL (ref 31.5–35.7)
MCV RBC AUTO: 95.4 FL (ref 79–97)
MONOCYTES # BLD AUTO: 0.37 10*3/MM3 (ref 0.1–0.9)
MONOCYTES NFR BLD AUTO: 6.6 % (ref 5–12)
NEUTROPHILS # BLD AUTO: 4.04 10*3/MM3 (ref 1.7–7)
NEUTROPHILS NFR BLD AUTO: 72.3 % (ref 42.7–76)
NRBC BLD AUTO-RTO: 0 /100 WBC (ref 0–0.2)
PLATELET # BLD AUTO: 173 10*3/MM3 (ref 140–450)
POTASSIUM SERPL-SCNC: 4.2 MMOL/L (ref 3.5–5.2)
PROT SERPL-MCNC: 6.6 G/DL (ref 6–8.5)
RBC # BLD AUTO: 4.17 10*6/MM3 (ref 4.14–5.8)
REPORT: NORMAL
SODIUM SERPL-SCNC: 140 MMOL/L (ref 136–145)
TESTOST FREE SERPL-MCNC: 2.3 PG/ML (ref 6.6–18.1)
TESTOST SERPL-MCNC: 161 NG/DL (ref 264–916)
TRIGL SERPL-MCNC: 77 MG/DL (ref 0–150)
VLDLC SERPL CALC-MCNC: 15 MG/DL (ref 5–40)
WBC # BLD AUTO: 5.59 10*3/MM3 (ref 3.4–10.8)

## 2024-04-15 NOTE — TELEPHONE ENCOUNTER
Left pt a message to return call.    OKAY FOR HUB AND  TO READ RESULTS.      A1c is good at 5.7%    Kidney function is stable    Testosterone slightly low - pt changed to taking every other day due to skin irritation    Lipid panel is good.  Continue with statin.

## 2024-07-28 DIAGNOSIS — R79.89 LOW TESTOSTERONE: ICD-10-CM

## 2024-07-28 DIAGNOSIS — E78.5 DYSLIPIDEMIA: ICD-10-CM

## 2024-07-28 DIAGNOSIS — R73.9 HYPERGLYCEMIA: ICD-10-CM

## 2024-07-28 DIAGNOSIS — E79.0 HYPERURICEMIA: ICD-10-CM

## 2024-07-28 DIAGNOSIS — N52.8 OTHER MALE ERECTILE DYSFUNCTION: ICD-10-CM

## 2024-07-28 DIAGNOSIS — I10 ESSENTIAL HYPERTENSION: ICD-10-CM

## 2024-07-28 DIAGNOSIS — E55.9 VITAMIN D DEFICIENCY: ICD-10-CM

## 2024-07-29 RX ORDER — EZETIMIBE 10 MG/1
10 TABLET ORAL DAILY
Qty: 90 TABLET | Refills: 1 | Status: SHIPPED | OUTPATIENT
Start: 2024-07-29

## 2024-07-29 NOTE — TELEPHONE ENCOUNTER
Rx Refill Note  Requested Prescriptions     Pending Prescriptions Disp Refills    ezetimibe (ZETIA) 10 MG tablet [Pharmacy Med Name: EZETIMIBE 10MG TABLETS] 90 tablet 1     Sig: TAKE 1 TABLET BY MOUTH DAILY      Last office visit with prescribing clinician: 4/12/2024   Last telemedicine visit with prescribing clinician: Visit date not found   Next office visit with prescribing clinician: Visit date not found                         Would you like a call back once the refill request has been completed: [] Yes [] No    If the office needs to give you a call back, can they leave a voicemail: [] Yes [] No    Enid Rankin  07/29/24, 07:57 EDT

## 2024-08-05 DIAGNOSIS — R79.89 LOW TESTOSTERONE IN MALE: ICD-10-CM

## 2024-08-05 RX ORDER — TESTOSTERONE GEL, 1% 10 MG/G
GEL TRANSDERMAL
Qty: 300 G | Refills: 0 | Status: SHIPPED | OUTPATIENT
Start: 2024-08-05

## 2024-08-05 NOTE — TELEPHONE ENCOUNTER
Rx Refill Note  Requested Prescriptions     Pending Prescriptions Disp Refills    testosterone (ANDROGEL) 50 MG/5GM (1%) gel gel [Pharmacy Med Name: TESTOSTERONE 1%(50MG) GEL 5GM UDT] 300 g      Sig: APPLY 1 PACKET EXTERNALLY TO EACH SHOULDER EVERY DAY      Last office visit with prescribing clinician: 12/29/2023   Last telemedicine visit with prescribing clinician: Visit date not found   Next office visit with prescribing clinician: 8/9/2024                         Would you like a call back once the refill request has been completed: [] Yes [] No    If the office needs to give you a call back, can they leave a voicemail: [] Yes [] No    Diana Rankin MA  08/05/24, 09:48 EDT

## 2024-08-09 ENCOUNTER — OFFICE VISIT (OUTPATIENT)
Dept: ENDOCRINOLOGY | Age: 84
End: 2024-08-09
Payer: MEDICARE

## 2024-08-09 VITALS
HEIGHT: 70 IN | WEIGHT: 164.2 LBS | HEART RATE: 68 BPM | BODY MASS INDEX: 23.51 KG/M2 | SYSTOLIC BLOOD PRESSURE: 138 MMHG | OXYGEN SATURATION: 97 % | DIASTOLIC BLOOD PRESSURE: 72 MMHG | TEMPERATURE: 97.4 F

## 2024-08-09 DIAGNOSIS — N40.0 BENIGN NON-NODULAR PROSTATIC HYPERPLASIA WITHOUT LOWER URINARY TRACT SYMPTOMS: ICD-10-CM

## 2024-08-09 DIAGNOSIS — E11.21 TYPE 2 DIABETES MELLITUS WITH NEPHROPATHY: Primary | ICD-10-CM

## 2024-08-09 DIAGNOSIS — R79.89 LOW TESTOSTERONE: ICD-10-CM

## 2024-08-09 DIAGNOSIS — E78.5 HYPERLIPIDEMIA, UNSPECIFIED HYPERLIPIDEMIA TYPE: ICD-10-CM

## 2024-08-09 DIAGNOSIS — I25.10 CORONARY ARTERY DISEASE INVOLVING NATIVE CORONARY ARTERY OF NATIVE HEART WITHOUT ANGINA PECTORIS: ICD-10-CM

## 2024-08-09 DIAGNOSIS — Z95.3 HISTORY OF AORTIC VALVE REPLACEMENT WITH PORCINE VALVE: ICD-10-CM

## 2024-08-09 DIAGNOSIS — E55.9 VITAMIN D DEFICIENCY: ICD-10-CM

## 2024-08-09 DIAGNOSIS — I10 ESSENTIAL HYPERTENSION: ICD-10-CM

## 2024-08-09 PROCEDURE — 99214 OFFICE O/P EST MOD 30 MIN: CPT | Performed by: INTERNAL MEDICINE

## 2024-08-09 PROCEDURE — 3078F DIAST BP <80 MM HG: CPT | Performed by: INTERNAL MEDICINE

## 2024-08-09 PROCEDURE — 3075F SYST BP GE 130 - 139MM HG: CPT | Performed by: INTERNAL MEDICINE

## 2024-08-09 NOTE — PROGRESS NOTES
Subjective   Randy Mccann is a 84 y.o. male.     History of Present Illness     He has known diabetes mellitus since 2015.  He is on diet alone.  He went off Actos 15 mg and Tradjenta 5 mg when he changed his diet in 6/22.  He has not used metformin in the past.  He does not check his blood sugars at home.  He has gained 4 pounds since April 2024.  His last meal was last night.      His last eye examination was in 9/23.  He has macular degeneration.  He denies blurry vision.  He has azotemia with serum creatinine ranging from 1.46-3.25 since 2021.  Urine microalbumin done in 12/23  is normal.  He denies numbness, tingling or burning in his hands or feet.     He has hypertension and coronary artery disease.  He had 1 vessel coronary artery bypass and porcine aortic valve replacement at [facility] in 2018.  He went off Tribenzor after he lost weight.  He denies history of heart attack or stroke.  He denies chest pain or shortness of breath.  His cardiologist is Dr. Enrrique Ogden.      He has vitamin D deficiency and is on vit D3 1000 units/day.  He has occasional diarrhea.  He has no history of celiac disease.      He has low testosterone and has been on AndroGel 1% 1 packet to both shoulders.  He last used it yesterday.  He was off AndroGel for 1 month until he restarted it 5 days ago.  He does not know the cause of the low testosterone.  He is  and has fathered 2 children.  He is not planning any more children.  He occasionally misses using testosterone.  Testosterone done in December 2023 is normal at 283 ng/ML.     He denies headaches, heat or cold intolerance, changes in shoes or ring size, easy bruising, or muscle weakness.     He has history of enlarged prostate and is on Cialis 5 mg/day and finasteride 5 mg once a day.  He has nocturia 2 times a night.  He denies difficulty initiating urination, dysuria, hesitancy, or retention.  He denies erectile dysfunction.  PSA done in December 2023 is normal at  "0.303.     He has hyperlipidemia and is on Zetia 10 mg/day.  He had muscle pain with Crestor in the past.  He has never used Lipitor or pravastatin.     He retired from [employer]    The following portions of the patient's history were reviewed and updated as appropriate: allergies, current medications, past family history, past medical history, past social history, past surgical history, and problem list.    Review of Systems   Eyes:  Negative for visual disturbance.   Respiratory:  Negative for shortness of breath.    Cardiovascular:  Negative for chest pain and palpitations.   Gastrointestinal: Negative.    Genitourinary: Negative.    Musculoskeletal:  Negative for myalgias.   Neurological:  Negative for numbness.     Vitals:    08/09/24 1149   BP: 138/72   Pulse: 68   Temp: 97.4 °F (36.3 °C)   TempSrc: Temporal   SpO2: 97%   Weight: 74.5 kg (164 lb 3.2 oz)   Height: 177.8 cm (70\")      Objective   Physical Exam  Constitutional:       General: He is not in acute distress.     Appearance: Normal appearance. He is not ill-appearing, toxic-appearing or diaphoretic.   Eyes:      General: No scleral icterus.        Right eye: No discharge.         Left eye: No discharge.   Neck:      Vascular: No carotid bruit.   Cardiovascular:      Rate and Rhythm: Normal rate and regular rhythm.      Heart sounds: No murmur heard.     No friction rub. No gallop.   Pulmonary:      Breath sounds: Normal breath sounds. No rales.   Chest:      Chest wall: No tenderness.   Abdominal:      General: Bowel sounds are normal.      Palpations: Abdomen is soft.      Tenderness: There is no right CVA tenderness or left CVA tenderness.   Musculoskeletal:      Right lower leg: No edema.      Left lower leg: No edema.      Comments: Feet warm.  No cyanosis or pedal edema   Lymphadenopathy:      Cervical: No cervical adenopathy.   Neurological:      General: No focal deficit present.      Mental Status: He is alert and oriented to person, " place, and time.   Psychiatric:         Mood and Affect: Mood normal.         Behavior: Behavior normal.       Office Visit on 04/12/2024   Component Date Value Ref Range Status    Hemoglobin A1C 04/12/2024 5.70 (H)  4.80 - 5.60 % Final    Comment: Hemoglobin A1C Ranges:  Increased Risk for Diabetes  5.7% to 6.4%  Diabetes                     >= 6.5%  Diabetic Goal                < 7.0%      Glucose 04/12/2024 100 (H)  65 - 99 mg/dL Final    BUN 04/12/2024 25 (H)  8 - 23 mg/dL Final    Creatinine 04/12/2024 1.30 (H)  0.76 - 1.27 mg/dL Final    EGFR Result 04/12/2024 55.2 (L)  >60.0 mL/min/1.73 Final    Comment: GFR Normal >60  Chronic Kidney Disease <60  Kidney Failure <15  The GFR formula is only valid for adults with stable renal  function between ages 18 and 70.      BUN/Creatinine Ratio 04/12/2024 19.2  7.0 - 25.0 Final    Sodium 04/12/2024 140  136 - 145 mmol/L Final    Potassium 04/12/2024 4.2  3.5 - 5.2 mmol/L Final    Chloride 04/12/2024 103  98 - 107 mmol/L Final    Total CO2 04/12/2024 25.0  22.0 - 29.0 mmol/L Final    Calcium 04/12/2024 10.2  8.6 - 10.5 mg/dL Final    Total Protein 04/12/2024 6.6  6.0 - 8.5 g/dL Final    Albumin 04/12/2024 4.6  3.5 - 5.2 g/dL Final    Globulin 04/12/2024 2.0  gm/dL Final    A/G Ratio 04/12/2024 2.3  g/dL Final    Total Bilirubin 04/12/2024 1.1  0.0 - 1.2 mg/dL Final    Alkaline Phosphatase 04/12/2024 78  39 - 117 U/L Final    AST (SGOT) 04/12/2024 30  1 - 40 U/L Final    ALT (SGPT) 04/12/2024 25  1 - 41 U/L Final    Total Cholesterol 04/12/2024 149  0 - 200 mg/dL Final    Comment: Cholesterol Reference Ranges  (U.S. Department of Health and Human Services ATP III  Classifications)  Desirable          <200 mg/dL  Borderline High    200-239 mg/dL  High Risk          >240 mg/dL  Triglyceride Reference Ranges  (U.S. Department of Health and Human Services ATP III  Classifications)  Normal           <150 mg/dL  Borderline High  150-199 mg/dL  High             200-499  mg/dL  Very High        >500 mg/dL  HDL Reference Ranges  (U.S. Department of Health and Human Services ATP III  Classifications)  Low     <40 mg/dl (major risk factor for CHD)  High    >60 mg/dl ('negative' risk factor for CHD)  LDL Reference Ranges  (U.S. Department of Health and Human Services ATP III  Classifications)  Optimal          <100 mg/dL  Near Optimal     100-129 mg/dL  Borderline High  130-159 mg/dL  High             160-189 mg/dL  Very High        >189 mg/dL      Triglycerides 04/12/2024 77  0 - 150 mg/dL Final    HDL Cholesterol 04/12/2024 56  40 - 60 mg/dL Final    VLDL Cholesterol Juan Daniel 04/12/2024 15  5 - 40 mg/dL Final    LDL Chol Calc (NIH) 04/12/2024 78  0 - 100 mg/dL Final    Testosterone, Total 04/12/2024 161 (L)  264 - 916 ng/dL Final    Comment: Adult male reference interval is based on a population of  healthy nonobese males (BMI <30) between 19 and 39 years old.  Kemal et.al. JCEM 2017,102;2962-6159. PMID: 47983819.      Testosterone, Free 04/12/2024 2.3 (L)  6.6 - 18.1 pg/mL Final    WBC 04/12/2024 5.59  3.40 - 10.80 10*3/mm3 Final    RBC 04/12/2024 4.17  4.14 - 5.80 10*6/mm3 Final    Hemoglobin 04/12/2024 12.9 (L)  13.0 - 17.7 g/dL Final    Hematocrit 04/12/2024 39.8  37.5 - 51.0 % Final    MCV 04/12/2024 95.4  79.0 - 97.0 fL Final    MCH 04/12/2024 30.9  26.6 - 33.0 pg Final    MCHC 04/12/2024 32.4  31.5 - 35.7 g/dL Final    RDW 04/12/2024 12.3  12.3 - 15.4 % Final    Platelets 04/12/2024 173  140 - 450 10*3/mm3 Final    Neutrophil Rel % 04/12/2024 72.3  42.7 - 76.0 % Final    Lymphocyte Rel % 04/12/2024 18.4 (L)  19.6 - 45.3 % Final    Monocyte Rel % 04/12/2024 6.6  5.0 - 12.0 % Final    Eosinophil Rel % 04/12/2024 1.8  0.3 - 6.2 % Final    Basophil Rel % 04/12/2024 0.7  0.0 - 1.5 % Final    Neutrophils Absolute 04/12/2024 4.04  1.70 - 7.00 10*3/mm3 Final    Lymphocytes Absolute 04/12/2024 1.03  0.70 - 3.10 10*3/mm3 Final    Monocytes Absolute 04/12/2024 0.37  0.10 - 0.90 10*3/mm3  Final    Eosinophils Absolute 04/12/2024 0.10  0.00 - 0.40 10*3/mm3 Final    Basophils Absolute 04/12/2024 0.04  0.00 - 0.20 10*3/mm3 Final    Immature Granulocyte Rel % 04/12/2024 0.2  0.0 - 0.5 % Final    Immature Grans Absolute 04/12/2024 0.01  0.00 - 0.05 10*3/mm3 Final    nRBC 04/12/2024 0.0  0.0 - 0.2 /100 WBC Final    Interpretation 04/12/2024 Note   Corrected    Comment: -------------------------------  CHRONIC KIDNEY DISEASE:  EGFR, BLOOD PRESSURE, AND PROTEINURIA ASSESSMENT  Estimated GFR is rising significantly with time (most recent  values). Current eGFR is 55 mL/min/1.73mE2 corresponding to  CKD stage 3a. Potassium is within goal and has decreased,  was 4.7 and now is 4.2 mmol/L. Glycemic control (HB A1c: 5.7  at risk for hypoglycemia.  EGFR, BLOOD PRESSURE, AND PROTEINURIA TREATMENT SUGGESTIONS  -  Guidelines recommend treating patients with type 2 diabetes  and CKD with an SGLT2 inhibitor for cardiorenal protection.  Guidelines recommend a target blood pressure of 120/80 mmHg  or less to reduce cardiovascular risk and CKD progression. A  higher blood pressure target may be appropriate in older  individuals to avoid symptomatic hypotension. Assessment of  albuminuria (urine albumin:creatinine ratio or urine  protein:creatinine ratio preferred) is recommended at least  annually in CKD patients for staging and disease prognosis.  EGFR, BLOOD PRESS                           URE, AND PROTEINURIA FOLLOW-UP  -  fasting Renal Panel within 12 months; Spot Urine Panel  (Albumin preferred) is recommended by guidelines, at least  yearly; Hemoglobin A1C within 6 months;  BONE and MINERAL ASSESSMENT  Calcium is within goal and has not changed significantly,  was 9.9 and now is 10.2 mg/dL. Carbon Dioxide is within goal  and has not changed significantly, was 27 and now is 25  mmol/L. Vitamin D is adequate (57.9 ng/mL 12/15/2023) .  BONE and MINERAL TREATMENT SUGGESTIONS  -  Interpretations require simultaneous  61-year-old female who was recently diagnosed with pancreatic carcinoma, who presents for placement of a MediPort for chemotherapy.   Pt has been NPO   measurements of serum  calcium and phosphorus.  BONE and MINERAL FOLLOW-UP  -  25-Hydroxy Vitamin D within 8 months; fasting PTH with Renal  Panel is due;  LIPIDS ASSESSMENT  Blood was non-fasting; interpret these results with caution.  LDL-C is normal and has not changed significantly, was 86  and now is 78 mg/dL. Triglyceride is normal and has not  changed significantly, was 80 and now is 77 mg/dL. Non-HDL  Cholesterol is optimal and has not changed signific                           antly,  was 101 and now is 93 mg/dL. HDL-C is normal and has not  changed significantly, was 54 and now is 56 mg/dL.  LIPIDS TREATMENT SUGGESTIONS  -  Therapeutic lifestyle changes are always valuable to  maintain optimal blood lipid status (diet, exercise, weight  management). If at least a 50% LDL reduction from baseline  has not been achieved, begin or increase statin. Consider  measurement of LDL particle number or Apo B to adjudicate  need for further LDL lowering therapy. If statin cannot be  tolerated or increased, alternatives include use of an  intestinal agent (ezetimibe or bile acid sequestrant) or  niacin.  LIPIDS FOLLOW-UP  -  fasting Lipid Panel within 12 months;  ANEMIA ASSESSMENT  Hemoglobin is low and has decreased, was 14.3 and now is  12.9 g/dL. Hemoglobin target assumes KAVITA is not in use.  ANEMIA TREATMENT SUGGESTIONS  -  Iron deficiency is a common cause of anemia in CKD.  Recommend measurement of Ferritin and TSAT.  ANEMIA FOLLOW-UP  -  CBC within 3 months; Fe/TIB                           C (TSAT) and Ferritin with CBC is  due;  -------------------------------  DISCLAIMER  These assessments and treatment suggestions are provided as  a convenience in support of the physician-patient  relationship and are not intended to replace the physician's  clinical judgment. They are derived from national guidelines  in addition to other evidence and expert opinion. The  clinician should consider this information within  the  context of clinical opinion and the individual patient.  SEE GUIDANCE FOR CHRONIC KIDNEY DISEASE PROGRAM: Kidney  Disease Improving Global Outcomes (KDIGO) clinical practice  guidelines are at http://kdigo.org/home/guidelines/.  National Kidney Foundation Kidney Disease Outcomes Quality  Initiative (KDOQI (TM)), with its limitations and  disclaimers, are at www.kidney.org/professionals/KDOQI. This  program is intended for patients who have been diagnosed  with stages 3, 4, or pre-dialysis 5 CKD. It is not intended  for children, pregnant patients, or transplant                            patients.      Interpretation 04/12/2024 Note   Final    Supplemental report is available.     Assessment & Plan   Diagnoses and all orders for this visit:    1. Type 2 diabetes mellitus with nephropathy (Primary)  -     Comprehensive Metabolic Panel  -     Hemoglobin A1c  -     Lipid Panel  -     TSH Rfx On Abnormal To Free T4    2. Essential hypertension    3. Coronary artery disease involving native coronary artery of native heart without angina pectoris    4. History of aortic valve replacement with porcine valve    5. Hyperlipidemia, unspecified hyperlipidemia type  -     Lipid Panel    6. Vitamin D deficiency  -     Vitamin D,25-Hydroxy    7. Low testosterone  -     Testosterone, Free / Tot Equilib  -     CBC & Differential    8. Benign non-nodular prostatic hyperplasia without lower urinary tract symptoms      Continue no concentrated sweet low-fat diet.    Continue Zetia 10 mg/day.    Continue vitamin D3 1000 units/day.    Continue AndroGel 1% 1 packet on both shoulders daily.    Continue vitamin D3 1000 units/day.    Continue Zetia 10 mg/day.    Copy of my note sent to Esther Cisneros NP.    RTC 6 mos

## 2024-08-13 LAB
25(OH)D3+25(OH)D2 SERPL-MCNC: 50.7 NG/ML (ref 30–100)
ALBUMIN SERPL-MCNC: 4.5 G/DL (ref 3.5–5.2)
ALBUMIN/GLOB SERPL: 2.3 G/DL
ALP SERPL-CCNC: 65 U/L (ref 39–117)
ALT SERPL-CCNC: 19 U/L (ref 1–41)
AST SERPL-CCNC: 27 U/L (ref 1–40)
BASOPHILS # BLD AUTO: 0.04 10*3/MM3 (ref 0–0.2)
BASOPHILS NFR BLD AUTO: 0.7 % (ref 0–1.5)
BILIRUB SERPL-MCNC: 0.8 MG/DL (ref 0–1.2)
BUN SERPL-MCNC: 21 MG/DL (ref 8–23)
BUN/CREAT SERPL: 18.1 (ref 7–25)
CALCIUM SERPL-MCNC: 9.7 MG/DL (ref 8.6–10.5)
CHLORIDE SERPL-SCNC: 105 MMOL/L (ref 98–107)
CHOLEST SERPL-MCNC: 151 MG/DL (ref 0–200)
CO2 SERPL-SCNC: 25.4 MMOL/L (ref 22–29)
CREAT SERPL-MCNC: 1.16 MG/DL (ref 0.76–1.27)
EGFRCR SERPLBLD CKD-EPI 2021: 62.9 ML/MIN/1.73
EOSINOPHIL # BLD AUTO: 0.09 10*3/MM3 (ref 0–0.4)
EOSINOPHIL NFR BLD AUTO: 1.7 % (ref 0.3–6.2)
ERYTHROCYTE [DISTWIDTH] IN BLOOD BY AUTOMATED COUNT: 12.3 % (ref 12.3–15.4)
GLOBULIN SER CALC-MCNC: 2 GM/DL
GLUCOSE SERPL-MCNC: 107 MG/DL (ref 65–99)
HBA1C MFR BLD: 6 % (ref 4.8–5.6)
HCT VFR BLD AUTO: 38.3 % (ref 37.5–51)
HDLC SERPL-MCNC: 54 MG/DL (ref 40–60)
HGB BLD-MCNC: 12.7 G/DL (ref 13–17.7)
IMM GRANULOCYTES # BLD AUTO: 0.01 10*3/MM3 (ref 0–0.05)
IMM GRANULOCYTES NFR BLD AUTO: 0.2 % (ref 0–0.5)
IMP & REVIEW OF LAB RESULTS: NORMAL
LDLC SERPL CALC-MCNC: 82 MG/DL (ref 0–100)
LYMPHOCYTES # BLD AUTO: 1.08 10*3/MM3 (ref 0.7–3.1)
LYMPHOCYTES NFR BLD AUTO: 19.9 % (ref 19.6–45.3)
MCH RBC QN AUTO: 31.8 PG (ref 26.6–33)
MCHC RBC AUTO-ENTMCNC: 33.2 G/DL (ref 31.5–35.7)
MCV RBC AUTO: 96 FL (ref 79–97)
MONOCYTES # BLD AUTO: 0.37 10*3/MM3 (ref 0.1–0.9)
MONOCYTES NFR BLD AUTO: 6.8 % (ref 5–12)
NEUTROPHILS # BLD AUTO: 3.85 10*3/MM3 (ref 1.7–7)
NEUTROPHILS NFR BLD AUTO: 70.7 % (ref 42.7–76)
NRBC BLD AUTO-RTO: 0 /100 WBC (ref 0–0.2)
PLATELET # BLD AUTO: 144 10*3/MM3 (ref 140–450)
POTASSIUM SERPL-SCNC: 4.1 MMOL/L (ref 3.5–5.2)
PROT SERPL-MCNC: 6.5 G/DL (ref 6–8.5)
RBC # BLD AUTO: 3.99 10*6/MM3 (ref 4.14–5.8)
SODIUM SERPL-SCNC: 141 MMOL/L (ref 136–145)
TESTOST FREE MFR SERPL: 1.27 % (ref 1.5–4.2)
TESTOST FREE SERPL-MCNC: 3.44 NG/DL (ref 5–21)
TESTOST SERPL-MCNC: 271 NG/DL (ref 264–916)
TRIGL SERPL-MCNC: 77 MG/DL (ref 0–150)
TSH SERPL DL<=0.005 MIU/L-ACNC: 2.03 UIU/ML (ref 0.27–4.2)
VLDLC SERPL CALC-MCNC: 15 MG/DL (ref 5–40)
WBC # BLD AUTO: 5.44 10*3/MM3 (ref 3.4–10.8)

## 2024-11-15 DIAGNOSIS — I10 ESSENTIAL HYPERTENSION: ICD-10-CM

## 2024-11-15 DIAGNOSIS — N18.4 TYPE 2 DIABETES MELLITUS WITH STAGE 4 CHRONIC KIDNEY DISEASE, WITHOUT LONG-TERM CURRENT USE OF INSULIN: ICD-10-CM

## 2024-11-15 DIAGNOSIS — R79.89 LOW TESTOSTERONE: ICD-10-CM

## 2024-11-15 DIAGNOSIS — N52.8 OTHER MALE ERECTILE DYSFUNCTION: ICD-10-CM

## 2024-11-15 DIAGNOSIS — I12.9 BENIGN HYPERTENSION WITH CKD (CHRONIC KIDNEY DISEASE) STAGE IV: ICD-10-CM

## 2024-11-15 DIAGNOSIS — N18.4 BENIGN HYPERTENSION WITH CKD (CHRONIC KIDNEY DISEASE) STAGE IV: ICD-10-CM

## 2024-11-15 DIAGNOSIS — E55.9 VITAMIN D DEFICIENCY: ICD-10-CM

## 2024-11-15 DIAGNOSIS — E79.0 HYPERURICEMIA: ICD-10-CM

## 2024-11-15 DIAGNOSIS — E78.5 DYSLIPIDEMIA: ICD-10-CM

## 2024-11-15 DIAGNOSIS — R73.9 HYPERGLYCEMIA: ICD-10-CM

## 2024-11-15 DIAGNOSIS — E11.22 TYPE 2 DIABETES MELLITUS WITH STAGE 4 CHRONIC KIDNEY DISEASE, WITHOUT LONG-TERM CURRENT USE OF INSULIN: ICD-10-CM

## 2024-11-15 RX ORDER — TADALAFIL 5 MG/1
5 TABLET ORAL DAILY
Qty: 90 TABLET | Refills: 1 | Status: SHIPPED | OUTPATIENT
Start: 2024-11-15

## 2024-11-15 RX ORDER — FINASTERIDE 5 MG/1
5 TABLET, FILM COATED ORAL DAILY
Qty: 90 TABLET | Refills: 2 | Status: SHIPPED | OUTPATIENT
Start: 2024-11-15

## 2024-11-15 NOTE — TELEPHONE ENCOUNTER
Rx Refill Note  Requested Prescriptions     Pending Prescriptions Disp Refills    finasteride (PROSCAR) 5 MG tablet [Pharmacy Med Name: FINASTERIDE 5MG TABLETS] 90 tablet 2     Sig: TAKE 1 TABLET BY MOUTH DAILY    tadalafil (CIALIS) 5 MG tablet [Pharmacy Med Name: TADALAFIL 5MG TABLETS] 90 tablet 1     Sig: TAKE 1 TABLET BY MOUTH DAILY      Last office visit with prescribing clinician: 8/9/2024   Last telemedicine visit with prescribing clinician: Visit date not found   Next office visit with prescribing clinician: 2/14/2025                         Would you like a call back once the refill request has been completed: [] Yes [] No    If the office needs to give you a call back, can they leave a voicemail: [] Yes [] No    Diana Rankin MA  11/15/24, 07:20 EST

## 2024-12-30 DIAGNOSIS — R79.89 LOW TESTOSTERONE IN MALE: ICD-10-CM

## 2024-12-31 RX ORDER — TESTOSTERONE GEL, 1% 10 MG/G
GEL TRANSDERMAL
Qty: 300 G | Refills: 0 | Status: SHIPPED | OUTPATIENT
Start: 2024-12-31

## 2024-12-31 NOTE — TELEPHONE ENCOUNTER
Rx Refill Note  Requested Prescriptions     Pending Prescriptions Disp Refills    testosterone (ANDROGEL) 50 MG/5GM (1%) gel gel [Pharmacy Med Name: TESTOSTERONE 1%(50MG) GEL 5GM UDT] 300 g      Sig: APPLY EXTERNALLY TO SHOULDER DAILY      Last office visit with prescribing clinician: 8/9/2024   Last telemedicine visit with prescribing clinician: Visit date not found   Next office visit with prescribing clinician: 2/14/2025                         Would you like a call back once the refill request has been completed: [] Yes [] No    If the office needs to give you a call back, can they leave a voicemail: [] Yes [] No    Diana Rankin MA  12/31/24, 07:38 EST

## 2025-02-08 DIAGNOSIS — E79.0 HYPERURICEMIA: ICD-10-CM

## 2025-02-08 DIAGNOSIS — E78.5 DYSLIPIDEMIA: ICD-10-CM

## 2025-02-08 DIAGNOSIS — I10 ESSENTIAL HYPERTENSION: ICD-10-CM

## 2025-02-08 DIAGNOSIS — R79.89 LOW TESTOSTERONE: ICD-10-CM

## 2025-02-08 DIAGNOSIS — E55.9 VITAMIN D DEFICIENCY: ICD-10-CM

## 2025-02-08 DIAGNOSIS — R73.9 HYPERGLYCEMIA: ICD-10-CM

## 2025-02-08 DIAGNOSIS — N52.8 OTHER MALE ERECTILE DYSFUNCTION: ICD-10-CM

## 2025-02-10 RX ORDER — EZETIMIBE 10 MG/1
10 TABLET ORAL DAILY
Qty: 90 TABLET | Refills: 2 | Status: SHIPPED | OUTPATIENT
Start: 2025-02-10

## 2025-02-10 RX ORDER — CHOLECALCIFEROL (VITAMIN D3) 25 MCG
1000 TABLET ORAL DAILY
Qty: 90 TABLET | Refills: 2 | Status: SHIPPED | OUTPATIENT
Start: 2025-02-10

## 2025-02-10 NOTE — TELEPHONE ENCOUNTER
Rx Refill Note  Requested Prescriptions     Pending Prescriptions Disp Refills    cholecalciferol (VITAMIN D3) 25 MCG (1000 UT) tablet [Pharmacy Med Name: VITAMIN D3 1,000 UNIT TABLETS] 90 tablet 3     Sig: TAKE 1 TABLET BY MOUTH DAILY. START IN JUNE 2023      Last office visit with prescribing clinician: 8/9/2024   Last telemedicine visit with prescribing clinician: Visit date not found   Next office visit with prescribing clinician: 2/14/2025                         Would you like a call back once the refill request has been completed: [] Yes [] No    If the office needs to give you a call back, can they leave a voicemail: [] Yes [] No    Diana Rankin MA  02/10/25, 07:21 EST

## 2025-02-10 NOTE — TELEPHONE ENCOUNTER
Rx Refill Note  Requested Prescriptions     Pending Prescriptions Disp Refills    ezetimibe (ZETIA) 10 MG tablet [Pharmacy Med Name: EZETIMIBE 10MG TABLETS] 90 tablet 1     Sig: TAKE 1 TABLET BY MOUTH DAILY      Last office visit with prescribing clinician: 4/12/2024   Last telemedicine visit with prescribing clinician: Visit date not found   Next office visit with prescribing clinician: Visit date not found                         Would you like a call back once the refill request has been completed: [] Yes [] No    If the office needs to give you a call back, can they leave a voicemail: [] Yes [] No    Enid Rankin  02/10/25, 07:31 EST

## 2025-02-14 ENCOUNTER — OFFICE VISIT (OUTPATIENT)
Dept: ENDOCRINOLOGY | Age: 85
End: 2025-02-14
Payer: MEDICARE

## 2025-02-14 VITALS
WEIGHT: 164.2 LBS | HEIGHT: 70 IN | HEART RATE: 58 BPM | OXYGEN SATURATION: 97 % | BODY MASS INDEX: 23.51 KG/M2 | DIASTOLIC BLOOD PRESSURE: 60 MMHG | TEMPERATURE: 97.7 F | SYSTOLIC BLOOD PRESSURE: 152 MMHG

## 2025-02-14 DIAGNOSIS — E78.5 HYPERLIPIDEMIA, UNSPECIFIED HYPERLIPIDEMIA TYPE: ICD-10-CM

## 2025-02-14 DIAGNOSIS — I25.10 CORONARY ARTERY DISEASE INVOLVING NATIVE CORONARY ARTERY OF NATIVE HEART WITHOUT ANGINA PECTORIS: ICD-10-CM

## 2025-02-14 DIAGNOSIS — E55.9 VITAMIN D DEFICIENCY: ICD-10-CM

## 2025-02-14 DIAGNOSIS — N40.0 BENIGN PROSTATIC HYPERPLASIA WITHOUT LOWER URINARY TRACT SYMPTOMS: ICD-10-CM

## 2025-02-14 DIAGNOSIS — I10 ESSENTIAL HYPERTENSION: ICD-10-CM

## 2025-02-14 DIAGNOSIS — E11.21 TYPE 2 DIABETES MELLITUS WITH NEPHROPATHY: Primary | ICD-10-CM

## 2025-02-14 DIAGNOSIS — R79.89 LOW TESTOSTERONE: ICD-10-CM

## 2025-02-14 NOTE — LETTER
February 14, 2025     PAPITO Vinson  [address]  [place] [address]    Patient: Randy Mccann   YOB: 1940   Date of Visit: 2/14/2025     Dear PAPITO Vinson:       Thank you for referring Randy Mccann to me for evaluation. Below are the relevant portions of my assessment and plan of care.    If you have questions, please do not hesitate to call me. I look forward to following Randy along with you.         Sincerely,        Chito Pulido MD        CC: No Recipients    Chito Pulido MD  02/14/25 1331  Sign when Signing Visit  Subjective  Randy Mccann is a 84 y.o. male.     History of Present Illness     He has known diabetes mellitus since 2015.  He is on diet alone.  He went off Actos 15 mg and Tradjenta 5 mg when he changed his diet in 6/22.  He has not used metformin in the past.  He does not check his blood sugars at home.  He has no weight change since August 2024.  His last meal was last night.      His last eye examination was in October 2024.  Eye pressures were elevated and he was advised to see ophthalmologist.  He has macular degeneration.  He denies blurry vision.  He has azotemia with serum creatinine ranging from 1.46-3.25 since 2021.  Urine microalbumin done in 12/23  is normal.  He denies numbness, tingling or burning in his hands or feet.     He has hypertension and coronary artery disease.  He had 1 vessel coronary artery bypass and porcine aortic valve replacement at [facility] in 2018.  He went off Tribenzor after he lost weight.  He denies history of heart attack or stroke.  He denies chest pain or shortness of breath.      He has vitamin D deficiency and is on vit D3 1000 units/day.  He has occasional diarrhea.  He has no history of celiac disease.      He has low testosterone and has been on AndroGel 1% 1 packet to both shoulders.  He last used it this morning.  He does not know the cause of the low testosterone.      He is  and has  "fathered 2 children.  He is not planning any more children.  He occasionally misses using testosterone.  Testosterone done in December 2023 is normal at 283 ng/ML.     He denies headaches, heat or cold intolerance, changes in shoes or ring size, easy bruising, or muscle weakness.     He has history of enlarged prostate and is on Cialis 5 mg/day and finasteride 5 mg once a day.  He has nocturia 2-3 times a night and 2-3 times during the day.  He denies difficulty initiating urination, dysuria, incontinence, hesitancy, or retention.  He denies erectile dysfunction.  PSA done in December 2023 is normal at 0.303.     He has hyperlipidemia and is on Zetia 10 mg/day.  He had muscle pain with Crestor in the past.  He has never used Lipitor or pravastatin.     He retired from [employer]       The following portions of the patient's history were reviewed and updated as appropriate: allergies, current medications, past family history, past medical history, past social history, past surgical history, and problem list.    Review of Systems   Eyes:  Negative for visual disturbance.   Respiratory:  Negative for shortness of breath and wheezing.    Cardiovascular:  Negative for chest pain and palpitations.   Gastrointestinal: Negative.    Genitourinary:  Positive for frequency. Negative for difficulty urinating, dysuria, hematuria and urgency.   Musculoskeletal:  Negative for myalgias.   Neurological:  Negative for numbness.     Vitals:    02/14/25 1232   BP: 152/60   Pulse: 58   Temp: 97.7 °F (36.5 °C)   TempSrc: Oral   SpO2: 97%   Weight: 74.5 kg (164 lb 3.2 oz)   Height: 177.8 cm (70\")      Objective  Physical Exam  Constitutional:       Appearance: Normal appearance.   Eyes:      General: No scleral icterus.        Right eye: No discharge.         Left eye: No discharge.      Extraocular Movements: Extraocular movements intact.      Conjunctiva/sclera: Conjunctivae normal.   Cardiovascular:      Rate and Rhythm: " Normal rate and regular rhythm.      Heart sounds: Normal heart sounds. No murmur heard.     No gallop.   Pulmonary:      Effort: No respiratory distress.      Breath sounds: Normal breath sounds. No rales.   Abdominal:      General: Bowel sounds are normal.      Palpations: Abdomen is soft.      Tenderness: There is no right CVA tenderness or left CVA tenderness.   Musculoskeletal:      Comments: Feet warm.  No cyanosis or clubbing.  +1 leg edema bilaterally.  Multiple leg varicosities.   Neurological:      Mental Status: He is alert.      Comments: Intact light touch in lower extremities       Office Visit on 08/09/2024   Component Date Value Ref Range Status   • Glucose 08/09/2024 107 (H)  65 - 99 mg/dL Final   • BUN 08/09/2024 21  8 - 23 mg/dL Final   • Creatinine 08/09/2024 1.16  0.76 - 1.27 mg/dL Final   • EGFR Result 08/09/2024 62.9  >60.0 mL/min/1.73 Final    Comment: GFR Normal >60  Chronic Kidney Disease <60  Kidney Failure <15  The GFR formula is only valid for adults with stable renal  function between ages 18 and 70.     • BUN/Creatinine Ratio 08/09/2024 18.1  7.0 - 25.0 Final   • Sodium 08/09/2024 141  136 - 145 mmol/L Final   • Potassium 08/09/2024 4.1  3.5 - 5.2 mmol/L Final   • Chloride 08/09/2024 105  98 - 107 mmol/L Final   • Total CO2 08/09/2024 25.4  22.0 - 29.0 mmol/L Final   • Calcium 08/09/2024 9.7  8.6 - 10.5 mg/dL Final   • Total Protein 08/09/2024 6.5  6.0 - 8.5 g/dL Final   • Albumin 08/09/2024 4.5  3.5 - 5.2 g/dL Final   • Globulin 08/09/2024 2.0  gm/dL Final   • A/G Ratio 08/09/2024 2.3  g/dL Final   • Total Bilirubin 08/09/2024 0.8  0.0 - 1.2 mg/dL Final   • Alkaline Phosphatase 08/09/2024 65  39 - 117 U/L Final   • AST (SGOT) 08/09/2024 27  1 - 40 U/L Final   • ALT (SGPT) 08/09/2024 19  1 - 41 U/L Final   • Hemoglobin A1C 08/09/2024 6.00 (H)  4.80 - 5.60 % Final    Comment: Hemoglobin A1C Ranges:  Increased Risk for Diabetes  5.7% to 6.4%  Diabetes                     >=  6.5%  Diabetic Goal                < 7.0%     • Total Cholesterol 08/09/2024 151  0 - 200 mg/dL Final    Comment: Cholesterol Reference Ranges  (U.S. Department of Health and Human Services ATP III  Classifications)  Desirable          <200 mg/dL  Borderline High    200-239 mg/dL  High Risk          >240 mg/dL  Triglyceride Reference Ranges  (U.S. Department of Health and Human Services ATP III  Classifications)  Normal           <150 mg/dL  Borderline High  150-199 mg/dL  High             200-499 mg/dL  Very High        >500 mg/dL  HDL Reference Ranges  (U.S. Department of Health and Human Services ATP III  Classifications)  Low     <40 mg/dl (major risk factor for CHD)  High    >60 mg/dl ('negative' risk factor for CHD)  LDL Reference Ranges  (U.S. Department of Health and Human Services ATP III  Classifications)  Optimal          <100 mg/dL  Near Optimal     100-129 mg/dL  Borderline High  130-159 mg/dL  High             160-189 mg/dL  Very High        >189 mg/dL     • Triglycerides 08/09/2024 77  0 - 150 mg/dL Final   • HDL Cholesterol 08/09/2024 54  40 - 60 mg/dL Final   • VLDL Cholesterol Juan Daniel 08/09/2024 15  5 - 40 mg/dL Final   • LDL Chol Calc ([facility]) 08/09/2024 82  0 - 100 mg/dL Final   • 25 Hydroxy, Vitamin D 08/09/2024 50.7  30.0 - 100.0 ng/ml Final    Comment: Reference Range for Total Vitamin D 25(OH)  Deficiency <20.0 ng/mL  Insufficiency 21-29 ng/mL  Sufficiency  ng/mL  Toxicity >100 ng/ml     • Testosterone, Total 08/09/2024 271  264 - 916 ng/dL Final    Comment: Adult male reference interval is based on a population of  healthy nonobese males (BMI <30) between 19 and 39 years old.  Kemal, et.al. JCEM 2017,102;9453-1234. PMID: 55274204.     • Testosterone, Free 08/09/2024 3.44 (L)  5.00 - 21.00 ng/dL Final   • Testosterone, Free % 08/09/2024 1.27 (L)  1.50 - 4.20 % Final   • WBC 08/09/2024 5.44  3.40 - 10.80 10*3/mm3 Final   • RBC 08/09/2024 3.99 (L)  4.14 - 5.80 10*6/mm3 Final   • Hemoglobin  08/09/2024 12.7 (L)  13.0 - 17.7 g/dL Final   • Hematocrit 08/09/2024 38.3  37.5 - 51.0 % Final   • MCV 08/09/2024 96.0  79.0 - 97.0 fL Final   • MCH 08/09/2024 31.8  26.6 - 33.0 pg Final   • MCHC 08/09/2024 33.2  31.5 - 35.7 g/dL Final   • RDW 08/09/2024 12.3  12.3 - 15.4 % Final   • Platelets 08/09/2024 144  140 - 450 10*3/mm3 Final   • Neutrophil Rel % 08/09/2024 70.7  42.7 - 76.0 % Final   • Lymphocyte Rel % 08/09/2024 19.9  19.6 - 45.3 % Final   • Monocyte Rel % 08/09/2024 6.8  5.0 - 12.0 % Final   • Eosinophil Rel % 08/09/2024 1.7  0.3 - 6.2 % Final   • Basophil Rel % 08/09/2024 0.7  0.0 - 1.5 % Final   • Neutrophils Absolute 08/09/2024 3.85  1.70 - 7.00 10*3/mm3 Final   • Lymphocytes Absolute 08/09/2024 1.08  0.70 - 3.10 10*3/mm3 Final   • Monocytes Absolute 08/09/2024 0.37  0.10 - 0.90 10*3/mm3 Final   • Eosinophils Absolute 08/09/2024 0.09  0.00 - 0.40 10*3/mm3 Final   • Basophils Absolute 08/09/2024 0.04  0.00 - 0.20 10*3/mm3 Final   • Immature Granulocyte Rel % 08/09/2024 0.2  0.0 - 0.5 % Final   • Immature Grans Absolute 08/09/2024 0.01  0.00 - 0.05 10*3/mm3 Final   • nRBC 08/09/2024 0.0  0.0 - 0.2 /100 WBC Final   • TSH 08/09/2024 2.030  0.270 - 4.200 uIU/mL Final   • Interpretation 08/09/2024 Note   Final    Supplemental report is available.     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 Essential hypertension  -     Comprehensive Metabolic Panel    3. Coronary artery disease involving native coronary artery of native heart without angina pectoris  -     Lipid Panel    4. Vitamin D deficiency  -     Vitamin D,25-Hydroxy    5. Low testosterone  -     Testosterone, Free / Tot Equilib  -     CBC & Differential  -     PSA DIAGNOSTIC    6. Benign prostatic hyperplasia without lower  urinary tract symptoms  -     PSA DIAGNOSTIC    7. Hyperlipidemia, unspecified hyperlipidemia type  -     Comprehensive Metabolic Panel  -     Lipid Panel      Continue no concentrated sweet low-fat diet.    Continue vitamin D3 1000 units/day.    Continue AndroGel 1% 1 packet daily.  Continue Cialis 5 mg/day and finasteride 5 mg/day.    Continue Zetia 10 mg/day.    Copy of my note sent to Esther Cisneros NP.    RTC 6 mos.

## 2025-02-14 NOTE — PROGRESS NOTES
Subjective   Randy Mccann is a 84 y.o. male.     History of Present Illness     He has known diabetes mellitus since 2015.  He is on diet alone.  He went off Actos 15 mg and Tradjenta 5 mg when he changed his diet in 6/22.  He has not used metformin in the past.  He does not check his blood sugars at home.  He has no weight change since August 2024.  His last meal was last night.      His last eye examination was in October 2024.  Eye pressures were elevated and he was advised to see ophthalmologist.  He has macular degeneration.  He denies blurry vision.  He has azotemia with serum creatinine ranging from 1.46-3.25 since 2021.  Urine microalbumin done in 12/23  is normal.  He denies numbness, tingling or burning in his hands or feet.     He has hypertension and coronary artery disease.  He had 1 vessel coronary artery bypass and porcine aortic valve replacement at [place] in 2018.  He went off Tribenzor after he lost weight.  He denies history of heart attack or stroke.  He denies chest pain or shortness of breath.      He has vitamin D deficiency and is on vit D3 1000 units/day.  He has occasional diarrhea.  He has no history of celiac disease.      He has low testosterone and has been on AndroGel 1% 1 packet to both shoulders.  He last used it this morning.  He does not know the cause of the low testosterone.      He is  and has fathered 2 children.  He is not planning any more children.  He occasionally misses using testosterone.  Testosterone done in December 2023 is normal at 283 ng/ML.     He denies headaches, heat or cold intolerance, changes in shoes or ring size, easy bruising, or muscle weakness.     He has history of enlarged prostate and is on Cialis 5 mg/day and finasteride 5 mg once a day.  He has nocturia 2-3 times a night and 2-3 times during the day.  He denies difficulty initiating urination, dysuria, incontinence, hesitancy, or retention.  He denies erectile dysfunction.  PSA done in  "December 2023 is normal at 0.303.     He has hyperlipidemia and is on Zetia 10 mg/day.  He had muscle pain with Crestor in the past.  He has never used Lipitor or pravastatin.     He retired from [employer]       The following portions of the patient's history were reviewed and updated as appropriate: allergies, current medications, past family history, past medical history, past social history, past surgical history, and problem list.    Review of Systems   Eyes:  Negative for visual disturbance.   Respiratory:  Negative for shortness of breath and wheezing.    Cardiovascular:  Negative for chest pain and palpitations.   Gastrointestinal: Negative.    Genitourinary:  Positive for frequency. Negative for difficulty urinating, dysuria, hematuria and urgency.   Musculoskeletal:  Negative for myalgias.   Neurological:  Negative for numbness.     Vitals:    02/14/25 1232   BP: 152/60   Pulse: 58   Temp: 97.7 °F (36.5 °C)   TempSrc: Oral   SpO2: 97%   Weight: 74.5 kg (164 lb 3.2 oz)   Height: 177.8 cm (70\")      Objective   Physical Exam  Constitutional:       Appearance: Normal appearance.   Eyes:      General: No scleral icterus.        Right eye: No discharge.         Left eye: No discharge.      Extraocular Movements: Extraocular movements intact.      Conjunctiva/sclera: Conjunctivae normal.   Cardiovascular:      Rate and Rhythm: Normal rate and regular rhythm.      Heart sounds: Normal heart sounds. No murmur heard.     No gallop.   Pulmonary:      Effort: No respiratory distress.      Breath sounds: Normal breath sounds. No rales.   Abdominal:      General: Bowel sounds are normal.      Palpations: Abdomen is soft.      Tenderness: There is no right CVA tenderness or left CVA tenderness.   Musculoskeletal:      Comments: Feet warm.  No cyanosis or clubbing.  +1 leg edema bilaterally.  Multiple leg varicosities.   Neurological:      Mental Status: He is alert.      Comments: Intact light touch in lower " extremities       Office Visit on 08/09/2024   Component Date Value Ref Range Status    Glucose 08/09/2024 107 (H)  65 - 99 mg/dL Final    BUN 08/09/2024 21  8 - 23 mg/dL Final    Creatinine 08/09/2024 1.16  0.76 - 1.27 mg/dL Final    EGFR Result 08/09/2024 62.9  >60.0 mL/min/1.73 Final    Comment: GFR Normal >60  Chronic Kidney Disease <60  Kidney Failure <15  The GFR formula is only valid for adults with stable renal  function between ages 18 and 70.      BUN/Creatinine Ratio 08/09/2024 18.1  7.0 - 25.0 Final    Sodium 08/09/2024 141  136 - 145 mmol/L Final    Potassium 08/09/2024 4.1  3.5 - 5.2 mmol/L Final    Chloride 08/09/2024 105  98 - 107 mmol/L Final    Total CO2 08/09/2024 25.4  22.0 - 29.0 mmol/L Final    Calcium 08/09/2024 9.7  8.6 - 10.5 mg/dL Final    Total Protein 08/09/2024 6.5  6.0 - 8.5 g/dL Final    Albumin 08/09/2024 4.5  3.5 - 5.2 g/dL Final    Globulin 08/09/2024 2.0  gm/dL Final    A/G Ratio 08/09/2024 2.3  g/dL Final    Total Bilirubin 08/09/2024 0.8  0.0 - 1.2 mg/dL Final    Alkaline Phosphatase 08/09/2024 65  39 - 117 U/L Final    AST (SGOT) 08/09/2024 27  1 - 40 U/L Final    ALT (SGPT) 08/09/2024 19  1 - 41 U/L Final    Hemoglobin A1C 08/09/2024 6.00 (H)  4.80 - 5.60 % Final    Comment: Hemoglobin A1C Ranges:  Increased Risk for Diabetes  5.7% to 6.4%  Diabetes                     >= 6.5%  Diabetic Goal                < 7.0%      Total Cholesterol 08/09/2024 151  0 - 200 mg/dL Final    Comment: Cholesterol Reference Ranges  (U.S. Department of Health and Human Services ATP III  Classifications)  Desirable          <200 mg/dL  Borderline High    200-239 mg/dL  High Risk          >240 mg/dL  Triglyceride Reference Ranges  (U.S. Department of Health and Human Services ATP III  Classifications)  Normal           <150 mg/dL  Borderline High  150-199 mg/dL  High             200-499 mg/dL  Very High        >500 mg/dL  HDL Reference Ranges  (U.S. Department of Health and Human Services ATP  III  Classifications)  Low     <40 mg/dl (major risk factor for CHD)  High    >60 mg/dl ('negative' risk factor for CHD)  LDL Reference Ranges  (U.S. Department of Health and Human Services ATP III  Classifications)  Optimal          <100 mg/dL  Near Optimal     100-129 mg/dL  Borderline High  130-159 mg/dL  High             160-189 mg/dL  Very High        >189 mg/dL      Triglycerides 08/09/2024 77  0 - 150 mg/dL Final    HDL Cholesterol 08/09/2024 54  40 - 60 mg/dL Final    VLDL Cholesterol Juan Daniel 08/09/2024 15  5 - 40 mg/dL Final    LDL Chol Calc ([facility]) 08/09/2024 82  0 - 100 mg/dL Final    25 Hydroxy, Vitamin D 08/09/2024 50.7  30.0 - 100.0 ng/ml Final    Comment: Reference Range for Total Vitamin D 25(OH)  Deficiency <20.0 ng/mL  Insufficiency 21-29 ng/mL  Sufficiency  ng/mL  Toxicity >100 ng/ml      Testosterone, Total 08/09/2024 271  264 - 916 ng/dL Final    Comment: Adult male reference interval is based on a population of  healthy nonobese males (BMI <30) between 19 and 39 years old.  Kemal, et.al. JCEM 2017,102;8950-3562. PMID: [id number].      Testosterone, Free 08/09/2024 3.44 (L)  5.00 - 21.00 ng/dL Final    Testosterone, Free % 08/09/2024 1.27 (L)  1.50 - 4.20 % Final    WBC 08/09/2024 5.44  3.40 - 10.80 10*3/mm3 Final    RBC 08/09/2024 3.99 (L)  4.14 - 5.80 10*6/mm3 Final    Hemoglobin 08/09/2024 12.7 (L)  13.0 - 17.7 g/dL Final    Hematocrit 08/09/2024 38.3  37.5 - 51.0 % Final    MCV 08/09/2024 96.0  79.0 - 97.0 fL Final    MCH 08/09/2024 31.8  26.6 - 33.0 pg Final    MCHC 08/09/2024 33.2  31.5 - 35.7 g/dL Final    RDW 08/09/2024 12.3  12.3 - 15.4 % Final    Platelets 08/09/2024 144  140 - 450 10*3/mm3 Final    Neutrophil Rel % 08/09/2024 70.7  42.7 - 76.0 % Final    Lymphocyte Rel % 08/09/2024 19.9  19.6 - 45.3 % Final    Monocyte Rel % 08/09/2024 6.8  5.0 - 12.0 % Final    Eosinophil Rel % 08/09/2024 1.7  0.3 - 6.2 % Final    Basophil Rel % 08/09/2024 0.7  0.0 - 1.5 % Final    Neutrophils  Absolute 08/09/2024 3.85  1.70 - 7.00 10*3/mm3 Final    Lymphocytes Absolute 08/09/2024 1.08  0.70 - 3.10 10*3/mm3 Final    Monocytes Absolute 08/09/2024 0.37  0.10 - 0.90 10*3/mm3 Final    Eosinophils Absolute 08/09/2024 0.09  0.00 - 0.40 10*3/mm3 Final    Basophils Absolute 08/09/2024 0.04  0.00 - 0.20 10*3/mm3 Final    Immature Granulocyte Rel % 08/09/2024 0.2  0.0 - 0.5 % Final    Immature Grans Absolute 08/09/2024 0.01  0.00 - 0.05 10*3/mm3 Final    nRBC 08/09/2024 0.0  0.0 - 0.2 /100 WBC Final    TSH 08/09/2024 2.030  0.270 - 4.200 uIU/mL Final    Interpretation 08/09/2024 Note   Final    Supplemental report is available.     Assessment & Plan   Diagnoses and all orders for this visit:    1. 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 Essential hypertension  -     Comprehensive Metabolic Panel    3. Coronary artery disease involving native coronary artery of native heart without angina pectoris  -     Lipid Panel    4. Vitamin D deficiency  -     Vitamin D,25-Hydroxy    5. Low testosterone  -     Testosterone, Free / Tot Equilib  -     CBC & Differential  -     PSA DIAGNOSTIC    6. Benign prostatic hyperplasia without lower urinary tract symptoms  -     PSA DIAGNOSTIC    7. Hyperlipidemia, unspecified hyperlipidemia type  -     Comprehensive Metabolic Panel  -     Lipid Panel      Continue no concentrated sweet low-fat diet.    Continue vitamin D3 1000 units/day.    Continue AndroGel 1% 1 packet daily.  Continue Cialis 5 mg/day and finasteride 5 mg/day.    Continue Zetia 10 mg/day.    Copy of my note sent to Esther Cisneros NP.    RTC 6 mos.

## 2025-02-19 DIAGNOSIS — R79.89 LOW TESTOSTERONE: Primary | ICD-10-CM

## 2025-02-19 LAB
25(OH)D3+25(OH)D2 SERPL-MCNC: 44.5 NG/ML (ref 30–100)
ALBUMIN SERPL-MCNC: 4.8 G/DL (ref 3.7–4.7)
ALBUMIN/CREAT UR: 11 MG/G CREAT (ref 0–29)
ALP SERPL-CCNC: 75 IU/L (ref 44–121)
ALT SERPL-CCNC: 20 IU/L (ref 0–44)
AST SERPL-CCNC: 30 IU/L (ref 0–40)
BASOPHILS # BLD AUTO: 0 X10E3/UL (ref 0–0.2)
BASOPHILS NFR BLD AUTO: 1 %
BILIRUB SERPL-MCNC: 1 MG/DL (ref 0–1.2)
BUN SERPL-MCNC: 26 MG/DL (ref 8–27)
BUN/CREAT SERPL: 21 (ref 10–24)
CALCIUM SERPL-MCNC: 9.9 MG/DL (ref 8.6–10.2)
CHLORIDE SERPL-SCNC: 101 MMOL/L (ref 96–106)
CHOLEST SERPL-MCNC: 163 MG/DL (ref 100–199)
CO2 SERPL-SCNC: 25 MMOL/L (ref 20–29)
CREAT SERPL-MCNC: 1.24 MG/DL (ref 0.76–1.27)
CREAT UR-MCNC: 45.4 MG/DL
EGFRCR SERPLBLD CKD-EPI 2021: 58 ML/MIN/1.73
EOSINOPHIL # BLD AUTO: 0.1 X10E3/UL (ref 0–0.4)
EOSINOPHIL NFR BLD AUTO: 1 %
ERYTHROCYTE [DISTWIDTH] IN BLOOD BY AUTOMATED COUNT: 12.6 % (ref 11.6–15.4)
GLOBULIN SER CALC-MCNC: 2 G/DL (ref 1.5–4.5)
GLUCOSE SERPL-MCNC: 119 MG/DL (ref 70–99)
HBA1C MFR BLD: 6 % (ref 4.8–5.6)
HCT VFR BLD AUTO: 40.9 % (ref 37.5–51)
HDLC SERPL-MCNC: 51 MG/DL
HGB BLD-MCNC: 13.7 G/DL (ref 13–17.7)
IMM GRANULOCYTES # BLD AUTO: 0 X10E3/UL (ref 0–0.1)
IMM GRANULOCYTES NFR BLD AUTO: 0 %
IMP & REVIEW OF LAB RESULTS: NORMAL
LDLC SERPL CALC-MCNC: 89 MG/DL (ref 0–99)
LYMPHOCYTES # BLD AUTO: 0.8 X10E3/UL (ref 0.7–3.1)
LYMPHOCYTES NFR BLD AUTO: 16 %
MCH RBC QN AUTO: 32.2 PG (ref 26.6–33)
MCHC RBC AUTO-ENTMCNC: 33.5 G/DL (ref 31.5–35.7)
MCV RBC AUTO: 96 FL (ref 79–97)
MICROALBUMIN UR-MCNC: 4.9 UG/ML
MONOCYTES # BLD AUTO: 0.4 X10E3/UL (ref 0.1–0.9)
MONOCYTES NFR BLD AUTO: 7 %
NEUTROPHILS # BLD AUTO: 4 X10E3/UL (ref 1.4–7)
NEUTROPHILS NFR BLD AUTO: 75 %
PLATELET # BLD AUTO: 132 X10E3/UL (ref 150–450)
POTASSIUM SERPL-SCNC: 4.2 MMOL/L (ref 3.5–5.2)
PROT SERPL-MCNC: 6.8 G/DL (ref 6–8.5)
PSA SERPL-MCNC: 0.1 NG/ML (ref 0–4)
RBC # BLD AUTO: 4.25 X10E6/UL (ref 4.14–5.8)
REPORT: NORMAL
SODIUM SERPL-SCNC: 142 MMOL/L (ref 134–144)
TESTOST FREE MFR SERPL: 2.42 % (ref 1.5–4.2)
TESTOST FREE SERPL-MCNC: 2.59 NG/DL (ref 5–21)
TESTOST SERPL-MCNC: 107 NG/DL (ref 264–916)
TRIGL SERPL-MCNC: 132 MG/DL (ref 0–149)
VLDLC SERPL CALC-MCNC: 23 MG/DL (ref 5–40)
WBC # BLD AUTO: 5.2 X10E3/UL (ref 3.4–10.8)

## 2025-02-20 ENCOUNTER — TELEPHONE (OUTPATIENT)
Dept: ENDOCRINOLOGY | Age: 85
End: 2025-02-20
Payer: MEDICARE

## 2025-02-20 NOTE — TELEPHONE ENCOUNTER
2/20 called lm for pt to call reg his labs   Ok for hub to read to patient   Please schedule labs if needed or follow ups  Ok for  to read to patient   Please schedule labs if needed or follow ups         ----- Message from Chito Pulido sent at 2/19/2025  4:40 PM EST -----  Hemoglobin A1c 6.0%.  Diabetes is well-controlled on diet alone.  Testosterone low at 107 ng per DL.  Please ask patient to come in for recheck testosterone.  Orders placed on chart.  Normal hemoglobin and hematocrit.  Normal urine microalbumin.  Normal vitamin D.  Continue vitamin D3 1000 units/day.  LDL 89.  HDL 51.  Triglycerides 132.  Continue Zetia 10 mg/day.  Normal PSA.  .

## 2025-02-28 ENCOUNTER — LAB (OUTPATIENT)
Dept: ENDOCRINOLOGY | Age: 85
End: 2025-02-28
Payer: MEDICARE

## 2025-02-28 DIAGNOSIS — R79.89 LOW TESTOSTERONE: ICD-10-CM

## 2025-03-02 LAB
TESTOST FREE MFR SERPL: 3.8 % (ref 1.5–4.2)
TESTOST FREE SERPL-MCNC: 26.07 NG/DL (ref 5–21)
TESTOST SERPL-MCNC: 686 NG/DL (ref 264–916)

## 2025-03-03 NOTE — PROGRESS NOTES
Normal testosterone at 686 ng per DL.  Continue AndroGel 1% 1 packet to shoulders daily.  Send copy of labs to Esther Cisneros NP.  Copy of labs sent to patient through [facility].  
Home

## 2025-03-19 DIAGNOSIS — R79.89 LOW TESTOSTERONE IN MALE: ICD-10-CM

## 2025-03-20 RX ORDER — TESTOSTERONE GEL, 1% 10 MG/G
GEL TRANSDERMAL
Qty: 300 G | Refills: 0 | Status: SHIPPED | OUTPATIENT
Start: 2025-03-20

## 2025-03-20 NOTE — TELEPHONE ENCOUNTER
Rx Refill Note  Requested Prescriptions     Pending Prescriptions Disp Refills    testosterone (ANDROGEL) 50 MG/5GM (1%) gel gel [Pharmacy Med Name: TESTOSTERONE 1%(50MG) GEL 5GM UDT] 300 g 1     Sig: APPLY EXTERNALLY TO SHOULDER DAILY      Last office visit with prescribing clinician: 2/14/2025   Last telemedicine visit with prescribing clinician: Visit date not found   Next office visit with prescribing clinician: 8/15/2025                         Would you like a call back once the refill request has been completed: [] Yes [] No    If the office needs to give you a call back, can they leave a voicemail: [] Yes [] No    Diana Rankin MA  03/20/25, 07:43 EDT

## 2025-05-11 DIAGNOSIS — R73.9 HYPERGLYCEMIA: ICD-10-CM

## 2025-05-11 DIAGNOSIS — E78.5 DYSLIPIDEMIA: ICD-10-CM

## 2025-05-11 DIAGNOSIS — R79.89 LOW TESTOSTERONE: ICD-10-CM

## 2025-05-11 DIAGNOSIS — N18.4 TYPE 2 DIABETES MELLITUS WITH STAGE 4 CHRONIC KIDNEY DISEASE, WITHOUT LONG-TERM CURRENT USE OF INSULIN: ICD-10-CM

## 2025-05-11 DIAGNOSIS — I10 ESSENTIAL HYPERTENSION: ICD-10-CM

## 2025-05-11 DIAGNOSIS — E11.22 TYPE 2 DIABETES MELLITUS WITH STAGE 4 CHRONIC KIDNEY DISEASE, WITHOUT LONG-TERM CURRENT USE OF INSULIN: ICD-10-CM

## 2025-05-11 DIAGNOSIS — N52.8 OTHER MALE ERECTILE DYSFUNCTION: ICD-10-CM

## 2025-05-11 DIAGNOSIS — E79.0 HYPERURICEMIA: ICD-10-CM

## 2025-05-11 DIAGNOSIS — E55.9 VITAMIN D DEFICIENCY: ICD-10-CM

## 2025-05-12 RX ORDER — TADALAFIL 5 MG/1
5 TABLET ORAL DAILY
Qty: 90 TABLET | Refills: 1 | Status: SHIPPED | OUTPATIENT
Start: 2025-05-12

## 2025-05-12 NOTE — TELEPHONE ENCOUNTER
Rx Refill Note  Requested Prescriptions     Pending Prescriptions Disp Refills    tadalafil (CIALIS) 5 MG tablet [Pharmacy Med Name: TADALAFIL 5MG TABLETS] 90 tablet 1     Sig: TAKE 1 TABLET BY MOUTH DAILY      Last office visit with prescribing clinician: 2/14/2025   Last telemedicine visit with prescribing clinician: Visit date not found   Next office visit with prescribing clinician: 8/15/2025                         Would you like a call back once the refill request has been completed: [] Yes [] No    If the office needs to give you a call back, can they leave a voicemail: [] Yes [] No    Diana Rankin MA  05/12/25, 07:58 EDT

## 2025-08-05 DIAGNOSIS — I12.9 BENIGN HYPERTENSION WITH CKD (CHRONIC KIDNEY DISEASE) STAGE IV: ICD-10-CM

## 2025-08-05 DIAGNOSIS — N18.4 BENIGN HYPERTENSION WITH CKD (CHRONIC KIDNEY DISEASE) STAGE IV: ICD-10-CM

## 2025-08-05 RX ORDER — FINASTERIDE 5 MG/1
5 TABLET, FILM COATED ORAL DAILY
Qty: 90 TABLET | Refills: 2 | Status: SHIPPED | OUTPATIENT
Start: 2025-08-05

## 2025-08-15 ENCOUNTER — OFFICE VISIT (OUTPATIENT)
Dept: ENDOCRINOLOGY | Age: 85
End: 2025-08-15
Payer: MEDICARE

## 2025-08-15 VITALS
WEIGHT: 164.8 LBS | BODY MASS INDEX: 23.59 KG/M2 | HEIGHT: 70 IN | TEMPERATURE: 97.6 F | OXYGEN SATURATION: 98 % | SYSTOLIC BLOOD PRESSURE: 146 MMHG | HEART RATE: 76 BPM | DIASTOLIC BLOOD PRESSURE: 88 MMHG

## 2025-08-15 DIAGNOSIS — N40.0 BENIGN PROSTATIC HYPERPLASIA WITHOUT LOWER URINARY TRACT SYMPTOMS: ICD-10-CM

## 2025-08-15 DIAGNOSIS — I10 ESSENTIAL HYPERTENSION: ICD-10-CM

## 2025-08-15 DIAGNOSIS — E11.21 TYPE 2 DIABETES MELLITUS WITH NEPHROPATHY: Primary | ICD-10-CM

## 2025-08-15 DIAGNOSIS — Z95.3 HISTORY OF AORTIC VALVE REPLACEMENT WITH PORCINE VALVE: ICD-10-CM

## 2025-08-15 DIAGNOSIS — R79.89 LOW TESTOSTERONE: ICD-10-CM

## 2025-08-15 DIAGNOSIS — I25.10 CORONARY ARTERY DISEASE INVOLVING NATIVE CORONARY ARTERY OF NATIVE HEART WITHOUT ANGINA PECTORIS: ICD-10-CM

## 2025-08-15 DIAGNOSIS — E55.9 VITAMIN D DEFICIENCY: ICD-10-CM

## 2025-08-15 DIAGNOSIS — Z78.9 STATIN INTOLERANCE: ICD-10-CM

## 2025-08-15 DIAGNOSIS — E78.5 HYPERLIPIDEMIA, UNSPECIFIED HYPERLIPIDEMIA TYPE: ICD-10-CM

## 2025-08-15 PROCEDURE — 3077F SYST BP >= 140 MM HG: CPT | Performed by: INTERNAL MEDICINE

## 2025-08-15 PROCEDURE — 99214 OFFICE O/P EST MOD 30 MIN: CPT | Performed by: INTERNAL MEDICINE

## 2025-08-15 PROCEDURE — 3079F DIAST BP 80-89 MM HG: CPT | Performed by: INTERNAL MEDICINE

## 2025-08-15 PROCEDURE — G2211 COMPLEX E/M VISIT ADD ON: HCPCS | Performed by: INTERNAL MEDICINE

## 2025-08-25 LAB
25(OH)D3+25(OH)D2 SERPL-MCNC: 43.8 NG/ML (ref 30–100)
ALBUMIN SERPL-MCNC: 4.8 G/DL (ref 3.5–5.2)
ALBUMIN/GLOB SERPL: 2.2 G/DL
ALP SERPL-CCNC: 87 U/L (ref 39–117)
ALT SERPL-CCNC: 20 U/L (ref 1–41)
AST SERPL-CCNC: 33 U/L (ref 1–40)
BASOPHILS # BLD AUTO: 0.05 10*3/MM3 (ref 0–0.2)
BASOPHILS NFR BLD AUTO: 0.8 % (ref 0–1.5)
BILIRUB SERPL-MCNC: 1 MG/DL (ref 0–1.2)
BUN SERPL-MCNC: 22 MG/DL (ref 8–23)
BUN/CREAT SERPL: 19.6 (ref 7–25)
CALCIUM SERPL-MCNC: 9.9 MG/DL (ref 8.6–10.5)
CHLORIDE SERPL-SCNC: 105 MMOL/L (ref 98–107)
CHOLEST SERPL-MCNC: 176 MG/DL (ref 0–200)
CO2 SERPL-SCNC: 24.1 MMOL/L (ref 22–29)
CREAT SERPL-MCNC: 1.12 MG/DL (ref 0.76–1.27)
EGFRCR SERPLBLD CKD-EPI 2021: 65.2 ML/MIN/1.73
EOSINOPHIL # BLD AUTO: 0.14 10*3/MM3 (ref 0–0.4)
EOSINOPHIL NFR BLD AUTO: 2.2 % (ref 0.3–6.2)
ERYTHROCYTE [DISTWIDTH] IN BLOOD BY AUTOMATED COUNT: 12.5 % (ref 12.3–15.4)
GLOBULIN SER CALC-MCNC: 2.2 GM/DL
GLUCOSE SERPL-MCNC: 112 MG/DL (ref 65–99)
HBA1C MFR BLD: 5.9 % (ref 4.8–5.6)
HCT VFR BLD AUTO: 39.5 % (ref 37.5–51)
HDLC SERPL-MCNC: 54 MG/DL (ref 40–60)
HGB BLD-MCNC: 13.4 G/DL (ref 13–17.7)
IMM GRANULOCYTES # BLD AUTO: 0.01 10*3/MM3 (ref 0–0.05)
IMM GRANULOCYTES NFR BLD AUTO: 0.2 % (ref 0–0.5)
IMP & REVIEW OF LAB RESULTS: NORMAL
LDLC SERPL CALC-MCNC: 97 MG/DL (ref 0–100)
LYMPHOCYTES # BLD AUTO: 1.15 10*3/MM3 (ref 0.7–3.1)
LYMPHOCYTES NFR BLD AUTO: 18.2 % (ref 19.6–45.3)
MCH RBC QN AUTO: 32.2 PG (ref 26.6–33)
MCHC RBC AUTO-ENTMCNC: 33.9 G/DL (ref 31.5–35.7)
MCV RBC AUTO: 95 FL (ref 79–97)
MONOCYTES # BLD AUTO: 0.4 10*3/MM3 (ref 0.1–0.9)
MONOCYTES NFR BLD AUTO: 6.3 % (ref 5–12)
NEUTROPHILS # BLD AUTO: 4.56 10*3/MM3 (ref 1.7–7)
NEUTROPHILS NFR BLD AUTO: 72.3 % (ref 42.7–76)
NRBC BLD AUTO-RTO: 0 /100 WBC (ref 0–0.2)
PLATELET # BLD AUTO: 157 10*3/MM3 (ref 140–450)
POTASSIUM SERPL-SCNC: 4.6 MMOL/L (ref 3.5–5.2)
PROT SERPL-MCNC: 7 G/DL (ref 6–8.5)
RBC # BLD AUTO: 4.16 10*6/MM3 (ref 4.14–5.8)
SODIUM SERPL-SCNC: 142 MMOL/L (ref 136–145)
TESTOST FREE MFR SERPL: 2.04 % (ref 1.5–4.2)
TESTOST FREE SERPL-MCNC: 2.75 NG/DL (ref 5–21)
TESTOST SERPL-MCNC: 135 NG/DL (ref 264–916)
TRIGL SERPL-MCNC: 142 MG/DL (ref 0–150)
VLDLC SERPL CALC-MCNC: 25 MG/DL (ref 5–40)
WBC # BLD AUTO: 6.31 10*3/MM3 (ref 3.4–10.8)